# Patient Record
Sex: FEMALE | Race: WHITE | Employment: OTHER | URBAN - METROPOLITAN AREA
[De-identification: names, ages, dates, MRNs, and addresses within clinical notes are randomized per-mention and may not be internally consistent; named-entity substitution may affect disease eponyms.]

---

## 2008-03-28 LAB — COLONOSCOPY, EXTERNAL: 0

## 2011-01-01 LAB — PAP SMEAR, EXTERNAL: 0

## 2017-02-17 ENCOUNTER — APPOINTMENT (OUTPATIENT)
Dept: CT IMAGING | Age: 72
End: 2017-02-17
Attending: PHYSICIAN ASSISTANT
Payer: MEDICARE

## 2017-02-17 ENCOUNTER — HOSPITAL ENCOUNTER (OUTPATIENT)
Age: 72
Setting detail: OBSERVATION
Discharge: HOME | End: 2017-02-19
Attending: EMERGENCY MEDICINE | Admitting: INTERNAL MEDICINE
Payer: MEDICARE

## 2017-02-17 ENCOUNTER — APPOINTMENT (OUTPATIENT)
Dept: GENERAL RADIOLOGY | Age: 72
End: 2017-02-17
Attending: PHYSICIAN ASSISTANT
Payer: MEDICARE

## 2017-02-17 DIAGNOSIS — R55 SYNCOPE AND COLLAPSE: Primary | ICD-10-CM

## 2017-02-17 LAB
ALBUMIN SERPL BCP-MCNC: 3.9 G/DL (ref 3.5–5)
ALBUMIN/GLOB SERPL: 1.2 {RATIO} (ref 1.1–2.2)
ALP SERPL-CCNC: 66 U/L (ref 45–117)
ALT SERPL-CCNC: 39 U/L (ref 12–78)
ANION GAP BLD CALC-SCNC: 9 MMOL/L (ref 5–15)
APPEARANCE UR: CLEAR
AST SERPL W P-5'-P-CCNC: 23 U/L (ref 15–37)
BACTERIA URNS QL MICRO: NEGATIVE /HPF
BASOPHILS # BLD AUTO: 0 K/UL (ref 0–0.1)
BASOPHILS # BLD: 0 % (ref 0–1)
BILIRUB SERPL-MCNC: 0.3 MG/DL (ref 0.2–1)
BILIRUB UR QL: NEGATIVE
BUN SERPL-MCNC: 17 MG/DL (ref 6–20)
BUN/CREAT SERPL: 22 (ref 12–20)
CALCIUM SERPL-MCNC: 8.7 MG/DL (ref 8.5–10.1)
CHLORIDE SERPL-SCNC: 101 MMOL/L (ref 97–108)
CK MB CFR SERPL CALC: NORMAL % (ref 0–2.5)
CK MB SERPL-MCNC: <1 NG/ML (ref 5–25)
CK SERPL-CCNC: 90 U/L (ref 26–192)
CO2 SERPL-SCNC: 29 MMOL/L (ref 21–32)
COLOR UR: ABNORMAL
CREAT SERPL-MCNC: 0.76 MG/DL (ref 0.55–1.02)
D DIMER PPP FEU-MCNC: 0.93 MG/L FEU (ref 0–0.65)
EOSINOPHIL # BLD: 0 K/UL (ref 0–0.4)
EOSINOPHIL NFR BLD: 0 % (ref 0–7)
EPITH CASTS URNS QL MICRO: ABNORMAL /LPF
ERYTHROCYTE [DISTWIDTH] IN BLOOD BY AUTOMATED COUNT: 12.7 % (ref 11.5–14.5)
GLOBULIN SER CALC-MCNC: 3.2 G/DL (ref 2–4)
GLUCOSE BLD STRIP.AUTO-MCNC: 127 MG/DL (ref 65–100)
GLUCOSE SERPL-MCNC: 115 MG/DL (ref 65–100)
GLUCOSE UR STRIP.AUTO-MCNC: NEGATIVE MG/DL
HCT VFR BLD AUTO: 43.9 % (ref 35–47)
HGB BLD-MCNC: 14.3 G/DL (ref 11.5–16)
HGB UR QL STRIP: ABNORMAL
HYALINE CASTS URNS QL MICRO: ABNORMAL /LPF (ref 0–5)
KETONES UR QL STRIP.AUTO: ABNORMAL MG/DL
LEUKOCYTE ESTERASE UR QL STRIP.AUTO: NEGATIVE
LYMPHOCYTES # BLD AUTO: 8 % (ref 12–49)
LYMPHOCYTES # BLD: 0.9 K/UL (ref 0.8–3.5)
MAGNESIUM SERPL-MCNC: 2 MG/DL (ref 1.6–2.4)
MCH RBC QN AUTO: 30.3 PG (ref 26–34)
MCHC RBC AUTO-ENTMCNC: 32.6 G/DL (ref 30–36.5)
MCV RBC AUTO: 93 FL (ref 80–99)
MONOCYTES # BLD: 0.7 K/UL (ref 0–1)
MONOCYTES NFR BLD AUTO: 7 % (ref 5–13)
NEUTS SEG # BLD: 8.8 K/UL (ref 1.8–8)
NEUTS SEG NFR BLD AUTO: 85 % (ref 32–75)
NITRITE UR QL STRIP.AUTO: NEGATIVE
PH UR STRIP: 5 [PH] (ref 5–8)
PLATELET # BLD AUTO: 268 K/UL (ref 150–400)
POTASSIUM SERPL-SCNC: 3.9 MMOL/L (ref 3.5–5.1)
PROT SERPL-MCNC: 7.1 G/DL (ref 6.4–8.2)
PROT UR STRIP-MCNC: NEGATIVE MG/DL
RBC # BLD AUTO: 4.72 M/UL (ref 3.8–5.2)
RBC #/AREA URNS HPF: ABNORMAL /HPF (ref 0–5)
SERVICE CMNT-IMP: ABNORMAL
SODIUM SERPL-SCNC: 139 MMOL/L (ref 136–145)
SP GR UR REFRACTOMETRY: 1.01 (ref 1–1.03)
TROPONIN I SERPL-MCNC: <0.04 NG/ML
TSH SERPL DL<=0.05 MIU/L-ACNC: 3.3 UIU/ML (ref 0.36–3.74)
UROBILINOGEN UR QL STRIP.AUTO: 0.2 EU/DL (ref 0.2–1)
WBC # BLD AUTO: 10.4 K/UL (ref 3.6–11)
WBC URNS QL MICRO: ABNORMAL /HPF (ref 0–4)

## 2017-02-17 PROCEDURE — 80053 COMPREHEN METABOLIC PANEL: CPT | Performed by: PHYSICIAN ASSISTANT

## 2017-02-17 PROCEDURE — 83735 ASSAY OF MAGNESIUM: CPT | Performed by: PHYSICIAN ASSISTANT

## 2017-02-17 PROCEDURE — 82962 GLUCOSE BLOOD TEST: CPT

## 2017-02-17 PROCEDURE — 96374 THER/PROPH/DIAG INJ IV PUSH: CPT

## 2017-02-17 PROCEDURE — 65390000012 HC CONDITION CODE 44 OBSERVATION

## 2017-02-17 PROCEDURE — 99285 EMERGENCY DEPT VISIT HI MDM: CPT

## 2017-02-17 PROCEDURE — 65270000029 HC RM PRIVATE

## 2017-02-17 PROCEDURE — 82550 ASSAY OF CK (CPK): CPT | Performed by: PHYSICIAN ASSISTANT

## 2017-02-17 PROCEDURE — 74011250636 HC RX REV CODE- 250/636: Performed by: INTERNAL MEDICINE

## 2017-02-17 PROCEDURE — 85379 FIBRIN DEGRADATION QUANT: CPT | Performed by: INTERNAL MEDICINE

## 2017-02-17 PROCEDURE — 71020 XR CHEST PA LAT: CPT

## 2017-02-17 PROCEDURE — 70450 CT HEAD/BRAIN W/O DYE: CPT

## 2017-02-17 PROCEDURE — 96361 HYDRATE IV INFUSION ADD-ON: CPT

## 2017-02-17 PROCEDURE — 74011250636 HC RX REV CODE- 250/636: Performed by: PHYSICIAN ASSISTANT

## 2017-02-17 PROCEDURE — 36415 COLL VENOUS BLD VENIPUNCTURE: CPT | Performed by: PHYSICIAN ASSISTANT

## 2017-02-17 PROCEDURE — 85025 COMPLETE CBC W/AUTO DIFF WBC: CPT | Performed by: PHYSICIAN ASSISTANT

## 2017-02-17 PROCEDURE — 84443 ASSAY THYROID STIM HORMONE: CPT | Performed by: EMERGENCY MEDICINE

## 2017-02-17 PROCEDURE — 93005 ELECTROCARDIOGRAM TRACING: CPT

## 2017-02-17 PROCEDURE — 81001 URINALYSIS AUTO W/SCOPE: CPT | Performed by: PHYSICIAN ASSISTANT

## 2017-02-17 PROCEDURE — 84484 ASSAY OF TROPONIN QUANT: CPT | Performed by: PHYSICIAN ASSISTANT

## 2017-02-17 RX ORDER — SODIUM CHLORIDE 0.9 % (FLUSH) 0.9 %
5-10 SYRINGE (ML) INJECTION EVERY 8 HOURS
Status: DISCONTINUED | OUTPATIENT
Start: 2017-02-17 | End: 2017-02-19 | Stop reason: HOSPADM

## 2017-02-17 RX ORDER — SODIUM CHLORIDE 9 MG/ML
75 INJECTION, SOLUTION INTRAVENOUS CONTINUOUS
Status: DISCONTINUED | OUTPATIENT
Start: 2017-02-17 | End: 2017-02-19 | Stop reason: HOSPADM

## 2017-02-17 RX ORDER — ENOXAPARIN SODIUM 100 MG/ML
40 INJECTION SUBCUTANEOUS EVERY 24 HOURS
Status: DISCONTINUED | OUTPATIENT
Start: 2017-02-17 | End: 2017-02-19 | Stop reason: HOSPADM

## 2017-02-17 RX ORDER — SODIUM CHLORIDE 0.9 % (FLUSH) 0.9 %
5-10 SYRINGE (ML) INJECTION AS NEEDED
Status: DISCONTINUED | OUTPATIENT
Start: 2017-02-17 | End: 2017-02-19 | Stop reason: HOSPADM

## 2017-02-17 RX ORDER — ONDANSETRON 2 MG/ML
4 INJECTION INTRAMUSCULAR; INTRAVENOUS
Status: COMPLETED | OUTPATIENT
Start: 2017-02-17 | End: 2017-02-17

## 2017-02-17 RX ADMIN — ENOXAPARIN SODIUM 40 MG: 40 INJECTION SUBCUTANEOUS at 22:24

## 2017-02-17 RX ADMIN — SODIUM CHLORIDE 1000 ML: 900 INJECTION, SOLUTION INTRAVENOUS at 20:34

## 2017-02-17 RX ADMIN — SODIUM CHLORIDE 75 ML/HR: 900 INJECTION, SOLUTION INTRAVENOUS at 22:25

## 2017-02-17 RX ADMIN — ONDANSETRON 4 MG: 2 INJECTION INTRAMUSCULAR; INTRAVENOUS at 19:56

## 2017-02-17 NOTE — IP AVS SNAPSHOT
[address] [facility] 
 
 
 [address] [facility] 
[phone] Patient: Ulysses Johnson MRN: [id number] :1945 You are allergic to the following No active allergies Recent Documentation Height Weight BMI OB Status Smoking Status 1.626 m 75.1 kg 28.42 kg/m2 Menopause Never Smoker Unresulted Labs Order Current Status LIPID PANEL In process Emergency Contacts Name Discharge Info Relation Home Work Mobile Spyder Lynk 23 CAREGIVER [3] Child [2] [phone] [address] CAREGIVER [3]  [phone] About your hospitalization You were admitted on:  2017 You last received care in the:  [facility] 3 PRO CARE TELE 2 You were discharged on:  2017 Unit phone number:  [phone] Why you were hospitalized Your primary diagnosis was:  Not on File Your diagnoses also included:  Syncope, Urinary Incontinence Providers Seen During Your Hospitalizations Provider Role Specialty Primary office phone Carolyn Schulte MD Attending Provider Emergency Medicine [phone] Kaushik Bailey MD Attending Provider Internal Medicine [phone] Sherrell Sainz DO Attending Provider Internal Medicine [phone] Your Primary Care Physician (PCP) Primary Care Physician Office Phone Office Fax [address]88 [address] [phone] Follow-up Information Follow up With Details Comments Contact Info Nikko Pak MD Schedule an appointment as soon as possible for a visit in 2 weeks Event monitor [address] [address] [place]  [address] [facility] 
[phone] Alia Currie [address] [facility] [address] [address] 
[phone]  Diego Parish MD  Call office the week after discharge and speak with Traci to schedule a doppler of the carotids and a follow up appointment the same day after the test has been completed. appointment derek be 6 months from now --2017 [place] [address] [facility] [address] [facility] [place] [address] 
[phone] Current Discharge Medication List  
  
START taking these medications Dose & Instructions Dispensing Information Comments Morning Noon Evening Bedtime  
 aspirin 81 mg chewable tablet Your next dose is: Today, Tomorrow Other:  _________ Dose:  81 mg Take 1 Tab by mouth daily. Quantity:  30 Tab Refills:  0  
     
   
   
   
  
 pravastatin 40 mg tablet Commonly known as:  PRAVACHOL Your next dose is: Today, Tomorrow Other:  _________ Dose:  40 mg Take 1 Tab by mouth nightly. Quantity:  30 Tab Refills:  0 CONTINUE these medications which have NOT CHANGED Dose & Instructions Dispensing Information Comments Morning Noon Evening Bedtime  
 glucosamine-chondroitin 500-400 mg Cap Commonly known as:  20 [facility] Your next dose is: Today, Tomorrow Other:  _________ Dose:  2 Cap Take 2 Caps by mouth daily. Refills:  0 Where to Get Your Medications Information on where to get these meds will be given to you by the nurse or doctor. ! Ask your nurse or doctor about these medications  
  aspirin 81 mg chewable tablet  
 pravastatin 40 mg tablet Discharge Instructions Patient Discharge Instructions Alonso Hill / [id number] : 1945 Admitted 2017 Discharged: 2017 Primary Diagnoses Problem List as of 2017  Never Reviewed Codes Class Noted - Resolved Urinary incontinence ICD-10-CM: R32 
ICD-9-CM: 788.30  2017 - Present Syncope ICD-10-CM: R55 
ICD-9-CM: 780.2  2017 - Present Take Home Medications · It is important that you take the medication exactly as they are prescribed. · Keep your medication in the bottles provided by the pharmacist and keep a list of the medication names, dosages, and times to be taken in your wallet. · Do not take other medications without consulting your doctor. · You were started on two new medications. Baby aspirin was started to reduce your stroke What to do at [facility] Recommended diet: {diet:36683} Recommended activity: {discharge activity:35502} If you experience ***, please follow up with ***. Follow-up with your PCP in {0-10:86968} { day/week/month:60530} Information obtained by : 
I understand that if any problems occur once I am at home I am to contact my physician. I understand and acknowledge receipt of the instructions indicated above. Physician's or R.N.'s Signature                                                                  Date/Time Patient or Representative Signature                                                          Date/Time Discharge Instructions Attachments/References PRAVASTATIN (BY MOUTH) (ENGLISH) CAROTID STENOSIS (ENGLISH) VASOVAGAL SYNCOPE (ENGLISH) FAINTING (ENGLISH) Discharge Orders None [facility] Announcement We are excited to announce that we are making your provider's discharge notes available to you in Hello Universe. You will see these notes when they are completed and signed by the physician that discharged you from your recent hospital stay.   If you have any questions or concerns about any information you see in [employer], please call the [facility] Department where you were seen or reach out to your Primary Care Provider for more information about your plan of care. Introducing \[facility] [place] [facility]\"" & HEALTH SERVICES! [employer] introduces [employer] patient portal. Now you can access parts of your medical record, email your doctor's office, and request medication refills online. 1. In your internet browser, go to [email or web address] 2. Click on the First Time User? Click Here link in the Sign In box. You will see the New Member Sign Up page. 3. Enter your [employer] Access Code exactly as it appears below. You will not need to use this code after youve completed the sign-up process. If you do not sign up before the expiration date, you must request a new code. · [employer] Access Code: [email or web address] Expires: 5/18/2017  8:05 PM 
 
4. Enter the last four digits of your Social Security Number (xxxx) and Date of Birth (mm/dd/yyyy) as indicated and click Submit. You will be taken to the next sign-up page. 5. Create a [employer] ID. This will be your [employer] login ID and cannot be changed, so think of one that is secure and easy to remember. 6. Create a [employer] password. You can change your password at any time. 7. Enter your Password Reset Question and Answer. This can be used at a later time if you forget your password. 8. Enter your e-mail address. You will receive e-mail notification when new information is available in [address] 9. Click Sign Up. You can now view and download portions of your medical record. 10. Click the Download Summary menu link to download a portable copy of your medical information. If you have questions, please visit the Frequently Asked Questions section of the [employer] website. Remember, [employer] is NOT to be used for urgent needs. For medical emergencies, dial 911. Now available from your iPhone and Android! General Information Please provide this summary of care documentation to your next provider. Patient Signature:  ____________________________________________________________ Date:  ____________________________________________________________  
  
Mao Mobley Provider Signature:  ____________________________________________________________ Date:  ____________________________________________________________ More Information Pravastatin (By mouth) Pravastatin (prav-a-STAT-in) Treats high cholesterol and triglyceride levels. May reduce the risk of heart attack, stroke, and related health conditions. This medicine is a statin. Brand Name(s):Pravachol There may be other brand names for this medicine. When This Medicine Should Not Be Used: This medicine is not right for everyone. Do not use it if you have had an allergic reaction to pravastatin, you have active liver disease, or you are pregnant or breastfeeding. How to Use This Medicine:  
Tablet · Take your medicine as directed. Your dose may need to be changed several times to find what works best for you. · Take this medicine in the evening or at bedtime, unless your doctor tells you differently. · Missed dose: Take a dose as soon as you remember. If it is almost time for your next dose, wait until then and take a regular dose. Do not take extra medicine to make up for a missed dose. · Store the medicine in a closed container at room temperature, away from heat, moisture, and direct light. Drugs and Foods to Avoid: Ask your doctor or pharmacist before using any other medicine, including over-the-counter medicines, vitamins, and herbal products. · Some medicines and foods can affect how pravastatin works. Tell your doctor if you are taking boceprevir, cimetidine, clarithromycin, colchicine, cyclosporine, erythromycin, itraconazole, ketoconazole, niacin (vitamin B3), or spironolactone. · Tell your doctor if you are taking other medicine to lower your cholesterol level, such as cholestyramine, colestipol, gemfibrozil, or fenofibrate. You may need to take the medicine 1 hour before or 4 hours after you take pravastatin. Warnings While Using This Medicine: · It is not safe to take this medicine during pregnancy. It could harm an unborn baby. Tell your doctor right away if you become pregnant. · Tell your doctor if you have kidney disease, diabetes, epilepsy, muscle pain or weakness, thyroid problems, or a history of liver disease. Tell your doctor if you usually have more than 2 drinks of alcohol per day. · Tell any doctor or dentist who treats you that you are using this medicine. You may need to stop using this medicine several days before you have surgery or medical tests. · Your doctor will do lab tests at regular visits to check on the effects of this medicine. Keep all appointments. · Keep all medicine out of the reach of children. Never share your medicine with anyone. Possible Side Effects While Using This Medicine:  
Call your doctor right away if you notice any of these side effects: · Allergic reaction: Itching or hives, swelling in your face or hands, swelling or tingling in your mouth or throat, chest tightness, trouble breathing · Blistering, peeling, red skin rash · Dark urine or pale stools, diarrhea, nausea, vomiting, loss of appetite, pain in your upper stomach, yellow skin or eyes · Muscle pain, tenderness, or weakness · Unusual tiredness If you notice other side effects that you think are caused by this medicine, tell your doctor. Call your doctor for medical advice about side effects. You may report side effects to FDA at 4-554-FDA-2360 © 2016 [address] is for End User's use only and may not be sold, redistributed or otherwise used for commercial purposes. The above information is an  only.  It is not intended as medical advice for individual conditions or treatments. Talk to your doctor, nurse or pharmacist before following any medical regimen to see if it is safe and effective for you. Carotid Stenosis: Care Instructions Your Care Instructions Carotid stenosis is narrowing of one or both of the carotid arteries. These arteries take blood from the heart to the brain. There is one on each side of the neck. A substance called plaque builds up inside an artery. This makes it too narrow. Plaque comes from damage to the artery over time. This damage may be caused by high blood pressure, high cholesterol, diabetes, or smoking. Sometimes plaque can break loose from the carotid artery and move to the brain. This can cause a stroke or transient ischemic attack (TIA). The goal of treatment is to lower your risk of having a stroke or TIA. You can lower your risk by making healthy lifestyle changes and taking medicine. Sometimes a surgery or procedure is done. Follow-up care is a key part of your treatment and safety. Be sure to make and go to all appointments, and call your doctor if you are having problems. It's also a good idea to know your test results and keep a list of the medicines you take. How can you care for yourself at home? · Take your medicines exactly as prescribed. Call your doctor if you think you are having a problem with your medicine. You may take medicine to lower your blood pressure, to lower your cholesterol, or to prevent blood clots. · If you take a blood thinner, such as aspirin, be sure to get instructions about how to take your medicine safely. Blood thinners can cause serious bleeding problems. · Do not smoke. People who smoke have a higher chance of stroke than those who quit. If you need help quitting, talk to your doctor about stop-smoking programs and medicines. These can increase your chances of quitting for good. · Eat a healthy diet that is low in saturated fat and salt. Eat lots of fresh fruits and vegetables and foods high in fiber. · Stay at a healthy weight. Lose weight if you need to. · Talk to your doctor about starting an exercise program. Regular exercise lowers your chance of stroke. · Limit alcohol to 2 drinks a day for men and 1 drink a day for women. Too much alcohol can cause health problems. · Work with your doctor to control high blood pressure, high cholesterol, diabetes, and other conditions that increase your chance of a stroke. A healthy diet, exercise, weight loss (if needed), and medicines can help. · Avoid colds and flu. Get the flu vaccine every year. When should you call for help? Call 911 anytime you think you may need emergency care. For example, call if: 
· You passed out (lost consciousness). · You have symptoms of a stroke. These may include: 
¨ Sudden numbness, tingling, weakness, or loss of movement in your face, arm, or leg, especially on only one side of your body. ¨ Sudden vision changes. ¨ Sudden trouble speaking. ¨ Sudden confusion or trouble understanding simple statements. ¨ Sudden problems with walking or balance. ¨ A sudden, severe headache that is different from past headaches. Call your doctor now or seek immediate medical care if: 
· You are dizzy or lightheaded, or you feel like you may faint. Watch closely for changes in your health, and be sure to contact your doctor if you have any problems. Where can you learn more? Go to http://obi-shay.info/. Enter V782 in the search box to learn more about \"Carotid Stenosis: Care Instructions. \" Current as of: January 27, 2016 Content Version: 11.1 © 7321-8527 [employer]. Care instructions adapted under license by [employer] (which disclaims liability or warranty for this information).  If you have questions about a medical condition or this instruction, always ask your healthcare professional. [place] [address] any warranty or liability for your use of this information. Vasovagal Syncope: Care Instructions Your Care Instructions Vasovagal syncope (say \"eyk-zof-CHA-hunter ARZOLAVKPE-tyy-ttm\") is sudden dizziness or fainting that can be set off by things such as pain, stress, fear, or trauma. You may sweat or feel lightheaded, sick to your stomach, or tingly. The problem causes the heart rate to slow and the blood vessels to widen, or dilate, for a short time. When this happens, blood pools in the lower body, and less blood goes to the brain. You can usually get relief by lying down with your legs raised (elevated). This helps more blood to flow to your brain and may help relieve symptoms like feeling dizzy. Some doctors may recommend a technique that involves tensing your fists and arms. This type of fainting is often easy to predict. For example, it happens to some people when they see blood or have to get a shot. They may feel symptoms before they faint. An episode of vasovagal syncope usually responds well to self-care. Other treatment often isn't needed. But if the fainting keeps happening, your doctor may suggest further treatments. Follow-up care is a key part of your treatment and safety. Be sure to make and go to all appointments, and call your doctor if you are having problems. It's also a good idea to know your test results and keep a list of the medicines you take. How can you care for yourself at home? · Drink plenty of fluids to prevent dehydration. If you have kidney, heart, or liver disease and have to limit fluids, talk with your doctor before you increase your fluid intake. · Try to avoid things that you think may set off vasovagal syncope. · Talk to your doctor about any medicines you take. Some medicines may increase the chance of this condition occurring. · If you feel symptoms, lie down with your legs raised. Talk to your doctor about what to do if your symptoms come back. When should you call for help? Call 911 anytime you think you may need emergency care. For example, call if: 
· You have symptoms of a heart problem. These may include: ¨ Chest pain or pressure. ¨ Severe trouble breathing. ¨ A fast or irregular heartbeat. Watch closely for changes in your health, and be sure to contact your doctor if: 
· You have more episodes of fainting at home. · You do not get better as expected. Where can you learn more? Go to http://obi-shay.info/. Enter L754 in the search box to learn more about \"Vasovagal Syncope: Care Instructions. \" Current as of: May 27, 2016 Content Version: 11.1 © 8326-4611 [employer]. Care instructions adapted under license by [employer] (which disclaims liability or warranty for this information). If you have questions about a medical condition or this instruction, always ask your healthcare professional. [place] [address] any warranty or liability for your use of this information. Fainting: Care Instructions Your Care Instructions When you faint, or pass out, you lose consciousness for a short time. A brief drop in blood flow to the brain often causes it. When you fall or lie down, more blood flows to your brain and you regain consciousness. Emotional stress, pain, or overheatingespecially if you have been standingcan make you faint. In these cases, fainting is usually not serious. But fainting can be a sign of a more serious problem. Your doctor may want you to have more tests to rule out other causes. The treatment you need depends on the reason why you fainted. The doctor has checked you carefully, but problems can develop later. If you notice any problems or new symptoms, get medical treatment right away. Follow-up care is a key part of your treatment and safety. Be sure to make and go to all appointments, and call your doctor if you are having problems. It's also a good idea to know your test results and keep a list of the medicines you take. How can you care for yourself at home? · Drink plenty of fluids to prevent dehydration. If you have kidney, heart, or liver disease and have to limit fluids, talk with your doctor before you increase your fluid intake. When should you call for help? Call 911 anytime you think you may need emergency care. For example, call if: 
· You have symptoms of a heart problem. These may include: ¨ Chest pain or pressure. ¨ Severe trouble breathing. ¨ A fast or irregular heartbeat. ¨ Lightheadedness or sudden weakness. ¨ Coughing up pink, foamy mucus. ¨ Passing out. After you call 911, the  may tell you to chew 1 adult-strength or 2 to 4 low-dose aspirin. Wait for an ambulance. Do not try to drive yourself. · You have symptoms of a stroke. These may include: 
¨ Sudden numbness, tingling, weakness, or loss of movement in your face, arm, or leg, especially on only one side of your body. ¨ Sudden vision changes. ¨ Sudden trouble speaking. ¨ Sudden confusion or trouble understanding simple statements. ¨ Sudden problems with walking or balance. ¨ A sudden, severe headache that is different from past headaches. · You passed out (lost consciousness) again. Watch closely for changes in your health, and be sure to contact your doctor if: 
· You do not get better as expected. Where can you learn more? Go to http://obi-shay.info/. Enter J501 in the search box to learn more about \"Fainting: Care Instructions. \" Current as of: May 27, 2016 Content Version: 11.1 © 6206-7216 [employer].  Care instructions adapted under license by [employer] (which disclaims liability or warranty for this information). If you have questions about a medical condition or this instruction, always ask your healthcare professional. [place] [address] any warranty or liability for your use of this information.

## 2017-02-17 NOTE — IP AVS SNAPSHOT
Current Discharge Medication List  
  
Take these medications at their scheduled times Dose & Instructions Dispensing Information Comments Morning Noon Evening Bedtime  
 aspirin 81 mg chewable tablet Your next dose is: Today, Tomorrow Other:  ____________ Dose:  81 mg Take 1 Tab by mouth daily. Quantity:  30 Tab Refills:  0  
     
   
   
   
  
 glucosamine-chondroitin 500-400 mg Cap Commonly known as:  20 [facility] Your next dose is: Today, Tomorrow Other:  ____________ Dose:  2 Cap Take 2 Caps by mouth daily. Refills:  0  
     
   
   
   
  
 pravastatin 40 mg tablet Commonly known as:  PRAVACHOL Your next dose is: Today, Tomorrow Other:  ____________ Dose:  40 mg Take 1 Tab by mouth nightly. Quantity:  30 Tab Refills:  0 Where to Get Your Medications Information about where to get these medications is not yet available ! Ask your nurse or doctor about these medications  
  aspirin 81 mg chewable tablet  
 pravastatin 40 mg tablet

## 2017-02-18 ENCOUNTER — APPOINTMENT (OUTPATIENT)
Dept: CT IMAGING | Age: 72
End: 2017-02-18
Attending: PHYSICIAN ASSISTANT
Payer: MEDICARE

## 2017-02-18 ENCOUNTER — APPOINTMENT (OUTPATIENT)
Dept: MRI IMAGING | Age: 72
End: 2017-02-18
Attending: INTERNAL MEDICINE
Payer: MEDICARE

## 2017-02-18 PROBLEM — R32 URINARY INCONTINENCE: Status: ACTIVE | Noted: 2017-02-18

## 2017-02-18 LAB
ALBUMIN SERPL BCP-MCNC: 3.4 G/DL (ref 3.5–5)
ALBUMIN/GLOB SERPL: 1.2 {RATIO} (ref 1.1–2.2)
ALP SERPL-CCNC: 65 U/L (ref 45–117)
ALT SERPL-CCNC: 33 U/L (ref 12–78)
ANION GAP BLD CALC-SCNC: 8 MMOL/L (ref 5–15)
AST SERPL W P-5'-P-CCNC: 26 U/L (ref 15–37)
BASOPHILS # BLD AUTO: 0 K/UL (ref 0–0.1)
BASOPHILS # BLD: 0 % (ref 0–1)
BILIRUB SERPL-MCNC: 0.5 MG/DL (ref 0.2–1)
BUN SERPL-MCNC: 11 MG/DL (ref 6–20)
BUN/CREAT SERPL: 16 (ref 12–20)
CALCIUM SERPL-MCNC: 8.1 MG/DL (ref 8.5–10.1)
CHLORIDE SERPL-SCNC: 107 MMOL/L (ref 97–108)
CO2 SERPL-SCNC: 29 MMOL/L (ref 21–32)
CREAT SERPL-MCNC: 0.68 MG/DL (ref 0.55–1.02)
EOSINOPHIL # BLD: 0 K/UL (ref 0–0.4)
EOSINOPHIL NFR BLD: 1 % (ref 0–7)
ERYTHROCYTE [DISTWIDTH] IN BLOOD BY AUTOMATED COUNT: 12.7 % (ref 11.5–14.5)
GLOBULIN SER CALC-MCNC: 2.9 G/DL (ref 2–4)
GLUCOSE SERPL-MCNC: 95 MG/DL (ref 65–100)
HCT VFR BLD AUTO: 40.6 % (ref 35–47)
HGB BLD-MCNC: 13.7 G/DL (ref 11.5–16)
LYMPHOCYTES # BLD AUTO: 23 % (ref 12–49)
LYMPHOCYTES # BLD: 1.5 K/UL (ref 0.8–3.5)
MCH RBC QN AUTO: 31.2 PG (ref 26–34)
MCHC RBC AUTO-ENTMCNC: 33.7 G/DL (ref 30–36.5)
MCV RBC AUTO: 92.5 FL (ref 80–99)
MONOCYTES # BLD: 0.9 K/UL (ref 0–1)
MONOCYTES NFR BLD AUTO: 13 % (ref 5–13)
NEUTS SEG # BLD: 4.3 K/UL (ref 1.8–8)
NEUTS SEG NFR BLD AUTO: 63 % (ref 32–75)
PLATELET # BLD AUTO: 270 K/UL (ref 150–400)
POTASSIUM SERPL-SCNC: 3.9 MMOL/L (ref 3.5–5.1)
PROT SERPL-MCNC: 6.3 G/DL (ref 6.4–8.2)
RBC # BLD AUTO: 4.39 M/UL (ref 3.8–5.2)
SODIUM SERPL-SCNC: 144 MMOL/L (ref 136–145)
TROPONIN I SERPL-MCNC: <0.04 NG/ML
TROPONIN I SERPL-MCNC: <0.04 NG/ML
WBC # BLD AUTO: 6.8 K/UL (ref 3.6–11)

## 2017-02-18 PROCEDURE — 85025 COMPLETE CBC W/AUTO DIFF WBC: CPT | Performed by: INTERNAL MEDICINE

## 2017-02-18 PROCEDURE — 65660000000 HC RM CCU STEPDOWN

## 2017-02-18 PROCEDURE — 93306 TTE W/DOPPLER COMPLETE: CPT

## 2017-02-18 PROCEDURE — 36415 COLL VENOUS BLD VENIPUNCTURE: CPT | Performed by: INTERNAL MEDICINE

## 2017-02-18 PROCEDURE — 65390000012 HC CONDITION CODE 44 OBSERVATION

## 2017-02-18 PROCEDURE — 74011636320 HC RX REV CODE- 636/320: Performed by: RADIOLOGY

## 2017-02-18 PROCEDURE — 84484 ASSAY OF TROPONIN QUANT: CPT | Performed by: INTERNAL MEDICINE

## 2017-02-18 PROCEDURE — 71275 CT ANGIOGRAPHY CHEST: CPT

## 2017-02-18 PROCEDURE — 70551 MRI BRAIN STEM W/O DYE: CPT

## 2017-02-18 PROCEDURE — 80053 COMPREHEN METABOLIC PANEL: CPT | Performed by: INTERNAL MEDICINE

## 2017-02-18 RX ADMIN — IOPAMIDOL 75 ML: 755 INJECTION, SOLUTION INTRAVENOUS at 02:32

## 2017-02-18 NOTE — H&P
Admission History and Physical      NAME:  Yadira Warner   :   1945   MRN:  [id number]     PCP:  Sharla Diggs MD     Date/Time:  2017           Assessment/Plan:       Active Problems:    Syncope / urinary incontience / ?post-ictal state: favor neurocardiogenic as cause. Admit to telemetry. Neurology consult. TTE. Orthostatics. CTA for PE given elevated d-dimer. Serial Jorge. MRI brain given incontinence, prolonged unconsciousness, and ?post-ictal.         Subjective:     CHIEF COMPLAINT: Syncope/Fatigue     HISTORY OF PRESENT ILLNESS:     Ms. Castle is a 66 y.o.  female with a hx of childhood polio who is admitted with syncope. Ms. Castle presented to the Emergency Department today complaining of standing with friend outside at 1800, felt dizzy, flushed, diaphoretic followed by LOC and passing out. No GLF and caught by friend. LOC lasted 3-5 mins. Associated with urinary incontinence but no seizure activity. Resolved spontaneously but a/w fatigue, pale appearance and weakness. We will admit for further management. Past Medical History   Diagnosis Date    Polio         Past Surgical History   Procedure Laterality Date    Shadi biopsy breast stereotactic Right      negative       Social History   Substance Use Topics    Smoking status: Never Smoker    Smokeless tobacco: Not on file    Alcohol use No        No family hx of early cardiovascular disease     No Known Allergies     Prior to Admission medications    Medication Sig Start Date End Date Taking? Authorizing Provider   glucosamine-chondroitin (ARTHX) 500-400 mg cap Take 2 Caps by mouth daily.    Yes Phys Other, MD         Review of Systems:  (bold if positive, if negative)    Gen:  Eyes:  ENT:  CVS:  dizziness, syncopePulm:  GI:    :    MS:  Skin:  Psych:  Endo:    Hem:  Renal:    Neuro:            Objective:      VITALS:    Vital signs reviewed; most recent are:    Visit Vitals    /69    Pulse 83    Temp 97.8 °F (36.6 °C)    Resp 21    Ht 5' 3\" (1.6 m)    Wt 73.9 kg (163 lb)    SpO2 95%    BMI 28.87 kg/m2     SpO2 Readings from Last 6 Encounters:   02/17/17 95%   01/06/16 96%        No intake or output data in the 24 hours ending 02/17/17 4162         Exam:     Physical Exam:    Gen:  Well-developed, well-nourished, in no acute distress  HEENT:  Pink conjunctivae, PERRL, hearing intact to voice, moist mucous membranes  Neck:  Supple, without masses, thyroid non-tender  Resp:  No accessory muscle use, clear breath sounds without wheezes rales or rhonchi  Card:  No murmurs, normal S1, S2 without thrills, bruits or peripheral edema  Abd:  Soft, non-tender, non-distended, normoactive bowel sounds are present  Lymph:  No cervical adenopathy  Musc:  No cyanosis or clubbing  Skin:  No rashes or ulcers, skin turgor is good  Neuro:  Cranial nerves 3-12 are grossly intact,  strength is 5/5 bilaterally, dorsi / plantarflexion strength is 5/5 bilaterally, follows commands appropriately  Psych:  Alert with good insight. Oriented to person, place, and time       Labs:    Recent Labs      02/17/17 1952   WBC  10.4   HGB  14.3   HCT  43.9   PLT  268     Recent Labs      02/17/17 1952   NA  139   K  3.9   CL  101   CO2  29   GLU  115*   BUN  17   CREA  0.76   CA  8.7   MG  2.0   ALB  3.9   TBILI  0.3   SGOT  23   ALT  39     Lab Results   Component Value Date/Time    Glucose (POC) 127 02/17/2017 07:36 PM     No results for input(s): PH, PCO2, PO2, HCO3, FIO2 in the last 72 hours. No results for input(s): INR in the last 72 hours. No lab exists for component: INREXT    Chest Xray:  No acute process  EKG reviewed:   Normal sinus rhythm     Medical records reviewed in preparation for this admission: Old medical records. Nursing notes.     Surrogate decision maker:  patient, daughter and son    Total time spent with patient: [phone] discussed with: Patient, Family, Nursing Staff and >50% of time spent in counseling and coordination of care    Discussed:  Care Plan and D/C Planning    Prophylaxis:  Lovenox    Probable Disposition:  Home w/Family           ___________________________________________________    Attending Physician: Leigh Ann Sanches DO

## 2017-02-18 NOTE — ED NOTES
Bedside shift change report given to Skinny Trinh (oncoming nurse) by Amrit Waite RN (offgoing nurse). Report given with SBAR, MAR, Recent Results, Vital Signs, and plan of care. Pt is alert and oriented x 4 . Call bell within reach of patient. Safety/fall precautions in place. Family at bedside.

## 2017-02-18 NOTE — PROGRESS NOTES
Medical Progress Note      NAME: Nick Morales   :  1945  MRM:  [id number]    Date/Time: 2017  12:15 PM       Assessment / Plan:     Syncope (2017): Suspect this was vagal.  Seems to have hx of vagal sensitivity as reports feeling near syncopal with needles and last episode was when grandson had to have stitches. Had symptoms of feeling hot, diaphoretic prior to syncope. Had nausea, vomiting, and diarrhea afterwards. No recurrence. However, family reports urinary incontinence and lethargy post syncope. -- await echo   -- check orthostatics   -- neuro to see    Paroxysmal Afib:  Had bradycardia overnight with afib. Bradycardia could be vagal mediated. No prior hx of afib or cardiology eval.  [id number] score is 3 for age and gender. -- await echo   -- cardiology consult   -- continue telemetry               Subjective:     Chief Complaint:  Feels well. No recurrence of syncope. No n/v or diarrhea overnight. Denies chest pain or sob. Feels nervous being in the hospital.  Denies stressful events yesterday. Denies pain or injury. ROS:  (bold if positive, if negative)              Objective:       Vitals:          Last 24hrs VS reviewed since prior progress note.  Most recent are:    Visit Vitals    /55    Pulse 85    Temp 97.8 °F (36.6 °C)    Resp 18    Ht 5' 3\" (1.6 m)    Wt 73.9 kg (163 lb)    SpO2 96%    BMI 28.87 kg/m2     SpO2 Readings from Last 6 Encounters:   17 96%   16 96%          Intake/Output Summary (Last 24 hours) at 17 1215  Last data filed at 17 0526   Gross per 24 hour   Intake                0 ml   Output              325 ml   Net             -325 ml          Exam:     Physical Exam:    Gen:  Well-developed, well-nourished, in no acute distress  HEENT:  Pink conjunctivae, hearing intact to voice, moist mucous membranes  Neck:  Supple  Resp:  No accessory muscle use, clear breath sounds without wheezes rales or rhonchi  Card: No murmurs, normal S1, S2 without thrills or peripheral edema  Abd:  Soft, non-tender, non-distended, normoactive bowel sounds are present  Musc:  No cyanosis  Skin:  No rashes, skin turgor is good  Neuro:  Cn 3-12 grossly intact,  strength is 5/5 bilaterally and dorsi / plantarflexion is 5/5 bilaterally, follows commands appropriately  Psych:  Good insight, oriented to person, place and time, alert       Telemetry reviewed:   afib with bradycardia overnight    Medications Reviewed: (see below)    Lab Data Reviewed: (see below)    ______________________________________________________________________    Medications:     Current Facility-Administered Medications   Medication Dose Route Frequency    sodium chloride (NS) flush 5-10 mL  5-10 mL IntraVENous Q8H    sodium chloride (NS) flush 5-10 mL  5-10 mL IntraVENous PRN    0.9% sodium chloride infusion  75 mL/hr IntraVENous CONTINUOUS    enoxaparin (LOVENOX) injection 40 mg  40 mg SubCUTAneous Q24H     Current Outpatient Prescriptions   Medication Sig    glucosamine-chondroitin (ARTHX) 500-400 mg cap Take 2 Caps by mouth daily.             Lab Review:     Recent Labs      02/18/17   0528  02/17/17 1952   WBC  6.8  10.4   HGB  13.7  14.3   HCT  40.6  43.9   PLT  270  268     Recent Labs      02/18/17   0528  02/17/17 1952   NA  144  139   K  3.9  3.9   CL  107  101   CO2  29  29   GLU  95  115*   BUN  11  17   CREA  0.68  0.76   CA  8.1*  8.7   MG   --   2.0   ALB  3.4*  3.9   TBILI  0.5  0.3   SGOT  26  23   ALT  33  39     Lab Results   Component Value Date/Time    Glucose (POC) 127 02/17/2017 07:36 PM         Total time spent with patient: [address] discussed with: Patient and Family    Discussed:  Care Plan    Prophylaxis:  Lovenox    Disposition:  Home w/Family           ___________________________________________________    Attending Physician: Evi Cannon MD

## 2017-02-18 NOTE — ED NOTES
Introduced self as primary nurse. Discussed plan of care with patient. Bed in low locked position and call bell in reach. Opportunity to ask questions given. Patient advised that medical information will be discussed at this time and it is their own responsibility to let nurse know if such conversation should not take place in presence of visitors. Pt verbalizes understanding.

## 2017-02-18 NOTE — ED NOTES
TRANSFER - OUT REPORT:    Verbal report given to [facility] RN(name) on [employer]  being transferred to [facility](unit) for routine progression of care       Report consisted of patients Situation, Background, Assessment and   Recommendations(SBAR). Information from the following report(s) SBAR, ED Summary, Procedure Summary, MAR and Recent Results was reviewed with the receiving nurse. Lines:   Peripheral IV 02/17/17 Left Antecubital (Active)   Site Assessment Clean, dry, & intact 2/18/2017  3:09 AM   Phlebitis Assessment 0 2/18/2017  3:09 AM   Infiltration Assessment 0 2/18/2017  3:09 AM   Dressing Status Clean, dry, & intact 2/18/2017  3:09 AM   Dressing Type Tape;Transparent 2/18/2017  3:09 AM   Hub Color/Line Status Infusing;Patent;Capped 2/18/2017  3:09 AM   Action Taken Blood drawn 2/18/2017  5:26 AM   Alcohol Cap Used Yes 2/18/2017  5:26 AM        Opportunity for questions and clarification was provided.       Patient transported with:   Roel Marcial

## 2017-02-18 NOTE — CONSULTS
Cardiology Consultation Note                                 Karmen Dukes MD, [address], [place], [id number] [place] [address]                         Phone [phone]; Fax [phone]            2017  7:20 PM  Teodoro Lorenzo MD  :  1945   MRN:  [id number]     CC: syncope  Reason for consult: same  Admission Diagnosis: Syncope  Requesting MD:     ASSESSMENT/RECOMMENDATIONS:   1)Syncope  -she did pass out after standing for prolonged period of time. last year she had a similar episode. she has no medicaion changes. She was noted to be lethargic and urinated on herself.   -she has had issues with vaso vagal syncope  -will do carotid ultrasound    2. Palpitations  -would continue to monitor.  -will likely need event triggered loop recorder        Troponin< 0.04         MsFbaian Mulligan is a 66 y.o.  female with a hx of childhood polio who is admitted with syncope. Ms. Anay Mulligan presented to the Emergency Department today complaining of standing with friend outside at 1800, felt dizzy, flushed, diaphoretic followed by LOC and passing out. No GLF and caught by friend. LOC lasted 3-5 mins. Associated with urinary incontinence but no seizure activity. Resolved spontaneously but a/w fatigue, pale appearance and weakness. We will admit for further management. Lucy Red is a 66 y.o. female I am seeing for syncope. Symptoms include: irregular heart beats, near-syncope, syncope  Cardiac risk factors: family history, post-menopausal.she has passed out in the past. She has felt dizzy and diaphoretic. There was associated incontinence. She feels occasional rapid heart beats at night. No Known Allergies      Past Medical History   Diagnosis Date    Polio         Past Surgical History   Procedure Laterality Date    Shadi biopsy breast stereotactic Right      negative        . Home Medications:  Prior to Admission Medications   Prescriptions Last Dose Informant Patient Reported? Taking?   glucosamine-chondroitin ([facility]) 500-400 mg cap 2/17/2017 at Unknown time  Yes Yes   Sig: Take 2 Caps by mouth daily. Facility-Administered Medications: None       Hospital Medications:  Current Facility-Administered Medications   Medication Dose Route Frequency    sodium chloride (NS) flush 5-10 mL  5-10 mL IntraVENous Q8H    sodium chloride (NS) flush 5-10 mL  5-10 mL IntraVENous PRN    0.9% sodium chloride infusion  75 mL/hr IntraVENous CONTINUOUS    enoxaparin (LOVENOX) injection 40 mg  40 mg SubCUTAneous Q24H     Current Outpatient Prescriptions   Medication Sig    glucosamine-chondroitin (ARTHX) 500-400 mg cap Take 2 Caps by mouth daily. OBJECTIVE       Laboratory and Imaging have been reviewed and are notable for      ECG:  [facility]      Diagnostic Tests:     Recent Labs      02/18/17 0528 02/17/17 1952   CPK   --    --   90   CKMB   --    --   <1.0   TROIQ  <0.04   < >  <0.04    < > = values in this interval not displayed. Recent Labs      02/18/17 0528 02/17/17 1952   NA  144  139   K  3.9  3.9   CO2  29  29   BUN  11  17   CREA  0.68  0.76   GLU  95  115*   MG   --   2.0   WBC  6.8  10.4   HGB  13.7  14.3   HCT  40.6  43.9   PLT  270  268         Cardiac work up to date:                   Social History:  Social History   Substance Use Topics    Smoking status: Never Smoker    Smokeless tobacco: Not on file    Alcohol use No       Family History:  No family history on file. Review of Symptoms:  A comprehensive review of systems was negative except for that written in the HPI. Physical Exam:      Visit Vitals    /55    Pulse 85    Temp 97.8 °F (36.6 °C)    Resp 18    Ht 5' 3\" (1.6 m)    Wt 163 lb (73.9 kg)    SpO2 96%    BMI 28.87 kg/m2     General Appearance:  Well developed, well nourished,alert and oriented x 3, and individual in no acute distress.    Ears/Nose/Mouth/Throat: Hearing grossly normal.Normal oral mucosa,no scleral icterus     Neck: Supple no JVD or bruits,no cervical lymphadenopathy   Chest:   Lungs clear to auscultation bilaterally,no rales rhonchi or wheezing   Cardiovascular:  Regular rate and rhythm, S1, S2 normal,  Murmur. PMI nondisplaced   Abdomen:   Soft, non-tender, bowel sounds are active. No abdominal bruits   Extremities: No edema bilaterally. Pulses detected, no varicosities   Skin: Warm and dry. No bruising   Neuro:                                                             I have discussed the diagnosis with the patient and the intended plan as seen in the above orders. Questions were answered concerning future plans. I have discussed medication side effects and warnings with the patient as well. Jazmine Hughes is in agreement to the plan listed above and wishes to proceed. she  was instructed not to smoke, eat heart healthy diet  and to exercise. Thank you for this consult.       Ariana Hugo MD

## 2017-02-18 NOTE — PROGRESS NOTES
1503 - TRANSFER - IN REPORT:    Verbal report received from [facility] [address] [facility] RN(name) on [employer]  being received from ER(unit) for routine progression of care      Report consisted of patients Situation, Background, Assessment and   Recommendations(SBAR). Information from the following report(s) SBAR, Kardex, Accordion, Recent Results and Cardiac Rhythm NSR was reviewed with the receiving nurse. Opportunity for questions and clarification was provided. Assessment completed upon patients arrival to unit and care assumed. 1520 - Pt arrived to floor from ER/ Pt accompanied by son. Pt A&O x 4. Respirations even and unlabored on RA. No acute distress noted. Pt and son oriented to staff and policies. Bed in lowest position and call bell within reach. [facility] Primary Nurse Carly Cespedes, TEMO and Alda Castaneda RN performed a dual skin assessment on this patient No impairment noted  John score is 20    1920 - Bedside and Verbal shift change report given to Dexter Rivera (oncoming nurse) by Che Butler (offgoing nurse). Report included the following information SBAR, Kardex, Intake/Output, Accordion, Recent Results and Cardiac Rhythm NSR.

## 2017-02-18 NOTE — ED NOTES
Bedside shift change report given to Roger Procotr (oncoming nurse) by Kristel Dan RN (offgoing nurse). Report included the following information SBAR, Kardex, ED Summary, Procedure Summary, Intake/Output, MAR, Recent Results and Cardiac Rhythm NSR.

## 2017-02-18 NOTE — ED NOTES
Assumed care of patient. Introduced self as primary nurse. No new complaints at this time. Bed in low locked position with call bell within reach.

## 2017-02-18 NOTE — ED NOTES
Bedside and Verbal shift change report given to Dee Cowan (oncoming nurse) by José Miguel Delvalle (offgoing nurse). Report included the following information SBAR, Kardex, ED Summary, Intake/Output, MAR and Recent Results.

## 2017-02-18 NOTE — ED PROVIDER NOTES
HPI Comments: Merle Godfrey is a 66 y.o. female who presents ambulatory with son to ER with c/o syncopal episode x 6PM this evening. Pt states that she was standing outside talking to her neighbor when she suddenly started feeling lightheaded, nauseated, flush, and like she was going to pass out. States next thing she knows shes on the ground. States her neighbor reported that she had a syncopal episode and he caught her and lowered her to the ground. States pt was unconscious for \"2-3 minutes\". Pt was incontinent of urine during syncopal episode. No bowel incontinence. Pt states she remembers none of this and states she just remembers feeling like she was going to pass out then came to \"groggy\", shaky and nauseated. States neighbor helped her into the house where she sat in the living room and drank some water. States subsequently vomited 10mins later. States tried eating a peanut butter sandwich shortly after which she vomited up immediately as well prompting son to bring pt to ER. Pt notes generally feeling \"not well\", generally weak and shaky, and nauseated currently. No CP, SOB, abd pain, headache, weakness, blurred vision, and any other complaints. No hx of similar episodes/sx in the past. No hx seizures. No alcohol or illicit drug use. Notes had breakfast at 9AM this morning then a breakfast bar around 1230PM and small amount of water throughout the day otherwise hasn't eaten/drank much all day. She specifically denies any fevers, chills, chest pain, shortness of breath, headache, rash, diarrhea, abdominal pain, urinary/bowel changes, or weight loss.     PCP: Sheri Man MD   PMHx significant for: Past Medical History:    Polio                                                         PSHx significant for: Past Surgical History:    NAVEEN BIOPSY BREAST STEREOTACTIC                  Right 1980            Comment:negative     No Known Allergies    There are no other complaints, changes or physical findings at this time.      The history is provided by the patient and a relative. Past Medical History:   Diagnosis Date    Polio        Past Surgical History:   Procedure Laterality Date    Shadi biopsy breast stereotactic Right 1980     negative         No family history on file. Social History     Social History    Marital status:      Spouse name: N/A    Number of children: N/A    Years of education: N/A     Occupational History    Not on file. Social History Main Topics    Smoking status: Never Smoker    Smokeless tobacco: Not on file    Alcohol use No    Drug use: No    Sexual activity: Not on file     Other Topics Concern    Not on file     Social History Narrative         ALLERGIES: Review of patient's allergies indicates no known allergies. Review of Systems   Constitutional: Positive for chills. Negative for appetite change, fatigue and fever. HENT: Negative. Negative for congestion and sore throat. Eyes: Negative. Negative for visual disturbance. Respiratory: Negative. Negative for cough and shortness of breath. Cardiovascular: Positive for syncope. Negative for chest pain, palpitations and leg swelling. Gastrointestinal: Positive for nausea and vomiting. Negative for abdominal pain, constipation and diarrhea. Genitourinary: Negative. Negative for dysuria, flank pain and hematuria. Musculoskeletal: Negative. Negative for back pain and neck pain. Skin: Negative. Negative for rash. Neurological: Positive for syncope and light-headedness. Negative for dizziness, weakness, numbness and headaches. Hematological: Negative. Psychiatric/Behavioral: Negative. All other systems reviewed and are negative. Vitals:    02/17/17 1915   BP: (!) 138/98   Pulse: 77   Resp: 16   Temp: 97.8 °F (36.6 °C)   SpO2: 100%   Weight: 73.9 kg (163 lb)   Height: 5' 3\" (1.6 m)            Physical Exam   Constitutional: She is oriented to person, place, and time.  She appears well-developed and well-nourished. No distress. HENT:   Head: Normocephalic and atraumatic. Right Ear: Hearing, tympanic membrane, external ear and ear canal normal.   Left Ear: Hearing, tympanic membrane, external ear and ear canal normal.   Nose: Nose normal.   Mouth/Throat: Uvula is midline, oropharynx is clear and moist and mucous membranes are normal.   Eyes: Conjunctivae and EOM are normal. Pupils are equal, round, and reactive to light. Neck: Normal range of motion. Neck supple. Cardiovascular: Normal rate, regular rhythm, S1 normal, S2 normal, normal heart sounds, intact distal pulses and normal pulses. Exam reveals no gallop and no friction rub. No murmur heard. Pulses:       Dorsalis pedis pulses are 2+ on the right side, and 2+ on the left side. Pulmonary/Chest: Effort normal and breath sounds normal. No accessory muscle usage. No respiratory distress. She has no decreased breath sounds. She has no wheezes. She has no rhonchi. She has no rales. Abdominal: Soft. Normal appearance and bowel sounds are normal. She exhibits no distension. There is no hepatosplenomegaly. There is no tenderness. There is no rebound, no guarding and no CVA tenderness. Musculoskeletal: Normal range of motion. She exhibits no edema or tenderness. No midline cervical, thoracic, or lumbar spinal tenderness to palpation. FROM spine and all joints/extremities in ER without difficulty. Ambulatory in ER without difficulty. Neurological: She is alert and oriented to person, place, and time. She has normal strength. No sensory deficit. No focal neurologic deficit. Strength 5/5 and equal bilateral upper and lower extremities. NVI all extremities, brisk cap refill all extremities. DP pulse 2+ bilaterally. Radial pulse 2+ bilaterally. Skin: Skin is warm and dry. No rash noted. She is not diaphoretic. No erythema. No pallor. Psychiatric: She has a normal mood and affect.  Her behavior is normal.   Nursing note and vitals reviewed. MDM  Number of Diagnoses or Management Options  Syncope and collapse:   Diagnosis management comments: DDx: ACS, electrolyte imbalance, dehydration, TIA/CVA       Amount and/or Complexity of Data Reviewed  Clinical lab tests: ordered and reviewed  Tests in the radiology section of CPT®: ordered and reviewed  Obtain history from someone other than the patient: yes (Son )  Review and summarize past medical records: yes  Discuss the patient with other providers: yes (Dr. Lucy Sims; Dr. Joshua Benavides)  Independent visualization of images, tracings, or specimens: yes    Patient Progress  Patient progress: stable    ED Course       Procedures           EKG interpretation: (Preliminary)  Rhythm: normal sinus rhythm; and regular . Rate (approx.): 72; Axis: normal; NH interval: normal; QRS interval: normal ; ST/T wave: normal; Other findings: normal. Dyana Isaac PA-C             7:40 PM   Dyana Isaac PA-C discussed patient with Alexia Ruiz MD who is in agreement with care plan as outlined. Will be in to see the patient. No further recommendations. Dyana Isaac PA-C       9:00PM  Alexia uRiz MD in to see pt. Recommends adding on ddimer, otherwise agrees with care plan as discussed. Further Recommends admission to hospitalist. Dyana Isaac PA-C     CONSULT NOTE:   9:25 PM  Dyana Isaac PA-C  spoke with Dr. Joshua Benavides,   Specialty: hospitalist  Discussed pt's hx, disposition, and available diagnostic and imaging results. Reviewed care plans. Consultant agrees with plans as outlined. Will admit patient. Dyana Isaac PA-C      9:25 PM  Patient is being admitted to the hospital.  The results of their tests and reasons for their admission have been discussed with them and/or available family. They convey agreement and understanding for the need to be admitted and for their admission diagnosis.   Consultation has been made with the inpatient physician specialist for hospitalization. LABORATORY TESTS:  Recent Results (from the past 12 hour(s))   GLUCOSE, POC    Collection Time: 02/17/17  7:36 PM   Result Value Ref Range    Glucose (POC) 127 (H) 65 - 100 mg/dL    Performed by RATNA BRENNAN    CBC WITH AUTOMATED DIFF    Collection Time: 02/17/17  7:52 PM   Result Value Ref Range    WBC 10.4 3.6 - 11.0 K/uL    RBC 4.72 3.80 - 5.20 M/uL    HGB 14.3 11.5 - 16.0 g/dL    HCT 43.9 35.0 - 47.0 %    MCV 93.0 80.0 - 99.0 FL    MCH 30.3 26.0 - 34.0 PG    MCHC 32.6 30.0 - 36.5 g/dL    RDW 12.7 11.5 - 14.5 %    PLATELET 379 815 - 435 K/uL    NEUTROPHILS 85 (H) 32 - 75 %    LYMPHOCYTES 8 (L) 12 - 49 %    MONOCYTES 7 5 - 13 %    EOSINOPHILS 0 0 - 7 %    BASOPHILS 0 0 - 1 %    ABS. NEUTROPHILS 8.8 (H) 1.8 - 8.0 K/UL    ABS. LYMPHOCYTES 0.9 0.8 - 3.5 K/UL    ABS. MONOCYTES 0.7 0.0 - 1.0 K/UL    ABS. EOSINOPHILS 0.0 0.0 - 0.4 K/UL    ABS. BASOPHILS 0.0 0.0 - 0.1 K/UL   METABOLIC PANEL, COMPREHENSIVE    Collection Time: 02/17/17  7:52 PM   Result Value Ref Range    Sodium 139 136 - 145 mmol/L    Potassium 3.9 3.5 - 5.1 mmol/L    Chloride 101 97 - 108 mmol/L    CO2 29 21 - 32 mmol/L    Anion gap 9 5 - 15 mmol/L    Glucose 115 (H) 65 - 100 mg/dL    BUN 17 6 - 20 MG/DL    Creatinine 0.76 0.55 - 1.02 MG/DL    BUN/Creatinine ratio 22 (H) 12 - 20      GFR est AA >60 >60 ml/min/1.73m2    GFR est non-AA >60 >60 ml/min/1.73m2    Calcium 8.7 8.5 - 10.1 MG/DL    Bilirubin, total 0.3 0.2 - 1.0 MG/DL    ALT (SGPT) 39 12 - 78 U/L    AST (SGOT) 23 15 - 37 U/L    Alk.  phosphatase 66 45 - 117 U/L    Protein, total 7.1 6.4 - 8.2 g/dL    Albumin 3.9 3.5 - 5.0 g/dL    Globulin 3.2 2.0 - 4.0 g/dL    A-G Ratio 1.2 1.1 - 2.2     CK W/ CKMB & INDEX    Collection Time: 02/17/17  7:52 PM   Result Value Ref Range    CK 90 26 - 192 U/L    CK - MB <1.0 <3.6 NG/ML    CK-MB Index CANNOT BE CALCULATED 0 - 2.5     TROPONIN I    Collection Time: 02/17/17  7:52 PM   Result Value Ref Range    Troponin-I, Qt. <0.04 <0.05 ng/mL MAGNESIUM    Collection Time: 02/17/17  7:52 PM   Result Value Ref Range    Magnesium 2.0 1.6 - 2.4 mg/dL       IMAGING RESULTS:  CT HEAD WO CONT   Final Result      XR CHEST PA LAT   Final Result        Xr Chest Pa Lat    Result Date: 2/17/2017  INDICATION:  syncope. EXAM: 2 VIEW CHEST RADIOGRAPH. COMPARISON: None. FINDINGS: Frontal and lateral views of the chest show clear lungs. . The heart, mediastinum and pulmonary vasculature are normal.  The bony thorax is unremarkable for age. .. IMPRESSION:  No acute cardiopulmonary disease radiographically. .  . Ct Head Wo Cont    Result Date: 2/17/2017  EXAM:  CT HEAD WO CONT INDICATION:   Syncope/fainting today. No head trauma. History of polio. COMPARISON: None. TECHNIQUE: Unenhanced CT of the head was performed using 5 mm images. Brain and bone windows were generated. CT dose reduction was achieved through use of a standardized protocol tailored for this examination and automatic exposure control for dose modulation. FINDINGS: The ventricles and sulci are normal in size, shape and configuration and midline. Mild periventricular white matter low density in the periatrial regions suggests mild microvascular ischemic change. . There is no intracranial hemorrhage, extra-axial collection, mass, mass effect or midline shift. The basilar cisterns are open. No acute infarct is identified. The bone windows demonstrate no abnormalities. The visualized portions of the paranasal sinuses and mastoid air cells are clear. IMPRESSION: No acute intracranial abnormality. MEDICATIONS GIVEN:  Medications   sodium chloride 0.9 % bolus infusion 1,000 mL (1,000 mL IntraVENous New Bag 2/17/17 2034)   ondansetron (ZOFRAN) injection 4 mg (4 mg IntraVENous Given 2/17/17 1956)       IMPRESSION:  1. Syncope and collapse        PLAN:  1.  Admit to hospitalist

## 2017-02-18 NOTE — CONSULTS
[facility] [address]   [place] [address]   [place], [address]    [id number]   [phone] Fax         Dictated  Seems like cardiogenic syncope   Exam and mri nml  Get doppler and EEG  Will f/u     S.  MD Surinder

## 2017-02-18 NOTE — ED TRIAGE NOTES
Patient arrives with son, with c/o passing out, denies fall, states neighbor caught her.  States she was out for 2-3 minutes    Unable to obtain BP or temp, brought back to ED 1

## 2017-02-18 NOTE — ED NOTES
Assumed care of patient. Patient sleeping at this time on hospital bed, in no distress; v/s stable. Will continue to monitor.

## 2017-02-19 VITALS
TEMPERATURE: 98.2 F | HEIGHT: 64 IN | RESPIRATION RATE: 18 BRPM | OXYGEN SATURATION: 95 % | SYSTOLIC BLOOD PRESSURE: 144 MMHG | BODY MASS INDEX: 28.27 KG/M2 | DIASTOLIC BLOOD PRESSURE: 70 MMHG | WEIGHT: 165.57 LBS | HEART RATE: 76 BPM

## 2017-02-19 LAB
CHOLEST SERPL-MCNC: 204 MG/DL
HDLC SERPL-MCNC: 79 MG/DL
HDLC SERPL: 2.6 {RATIO} (ref 0–5)
LDLC SERPL CALC-MCNC: 107.8 MG/DL (ref 0–100)
LIPID PROFILE,FLP: ABNORMAL
TRIGL SERPL-MCNC: 86 MG/DL (ref ?–150)
VLDLC SERPL CALC-MCNC: 17.2 MG/DL

## 2017-02-19 PROCEDURE — 80061 LIPID PANEL: CPT | Performed by: INTERNAL MEDICINE

## 2017-02-19 PROCEDURE — 74011250637 HC RX REV CODE- 250/637: Performed by: INTERNAL MEDICINE

## 2017-02-19 PROCEDURE — 99218 HC RM OBSERVATION: CPT

## 2017-02-19 PROCEDURE — 65390000012 HC CONDITION CODE 44 OBSERVATION

## 2017-02-19 PROCEDURE — 95816 EEG AWAKE AND DROWSY: CPT | Performed by: PSYCHIATRY & NEUROLOGY

## 2017-02-19 PROCEDURE — 96372 THER/PROPH/DIAG INJ SC/IM: CPT

## 2017-02-19 PROCEDURE — 74011250636 HC RX REV CODE- 250/636: Performed by: INTERNAL MEDICINE

## 2017-02-19 PROCEDURE — 93880 EXTRACRANIAL BILAT STUDY: CPT

## 2017-02-19 PROCEDURE — 36415 COLL VENOUS BLD VENIPUNCTURE: CPT | Performed by: INTERNAL MEDICINE

## 2017-02-19 PROCEDURE — 96361 HYDRATE IV INFUSION ADD-ON: CPT

## 2017-02-19 RX ORDER — PRAVASTATIN SODIUM 20 MG/1
40 TABLET ORAL
Status: DISCONTINUED | OUTPATIENT
Start: 2017-02-19 | End: 2017-02-19 | Stop reason: HOSPADM

## 2017-02-19 RX ORDER — GUAIFENESIN 100 MG/5ML
81 LIQUID (ML) ORAL DAILY
Status: DISCONTINUED | OUTPATIENT
Start: 2017-02-19 | End: 2017-02-19 | Stop reason: HOSPADM

## 2017-02-19 RX ORDER — PRAVASTATIN SODIUM 40 MG/1
40 TABLET ORAL
Qty: 30 TAB | Refills: 0 | Status: SHIPPED | OUTPATIENT
Start: 2017-02-19 | End: 2020-12-05

## 2017-02-19 RX ORDER — GUAIFENESIN 100 MG/5ML
81 LIQUID (ML) ORAL DAILY
Qty: 30 TAB | Refills: 0 | Status: SHIPPED | OUTPATIENT
Start: 2017-02-19 | End: 2022-07-03

## 2017-02-19 RX ADMIN — ASPIRIN 81 MG CHEWABLE TABLET 81 MG: 81 TABLET CHEWABLE at 13:03

## 2017-02-19 RX ADMIN — ENOXAPARIN SODIUM 40 MG: 40 INJECTION SUBCUTANEOUS at 00:26

## 2017-02-19 RX ADMIN — SODIUM CHLORIDE 75 ML/HR: 900 INJECTION, SOLUTION INTRAVENOUS at 04:39

## 2017-02-19 RX ADMIN — Medication 10 ML: at 13:04

## 2017-02-19 RX ADMIN — Medication 10 ML: at 00:26

## 2017-02-19 NOTE — CONSULTS
Dipesh Lazo damion [place] [address]   [address] [facility], [address]   [address]       Name:  Miguelangel Gonzalez   MR#:  [id number]   :  1945   Account #:  [de-identified]    Date of Consultation:  2017   Date of Adm:  2017       REQUESTING PHYSICIAN: Stephanie Salvador MD.     HISTORY OF PRESENT ILLNESS: Thank you for asking us to see   this patient in neurologic consultation regarding an acute change in her   neurologic status - loss of consciousness. She is a pleasant 69-year-  old lady and she provides history. Her son, who is a SHEREE BACH [employer], is also at the bedside and provides history as well. This nice lady was out in her yard today and she was talking to her   neighbor, Eh Ag. She notes that she started to get hot and she took off   her sweater. She then started to sweat profusely, noting that her   blouse and her other clothing became wet. She felt lightheaded and   she told Eh Ag she thought she was going to faint. That was her last   recollection until she remembers being helped into the house to lay   down on the couch. As Katlynsugey Kimberli told her son, she started to faint, and Eh Ag   was sitting in the car at the time and the patient was standing outside   of the car. He was unable to get out of the vehicle, although the door   was open, in time to catch her, but she fell forward towards him. He   then supported her in a semi-upright position. She never got horizontal.   Per the story I get from her son, she was unresponsive for 3-5 minutes. There was no shaking. The patient tells me she does not remember   going in the house, although she vaguely remembers the front steps. She remembers lying down on the couch. Donaldjeffrey Ag called her son, and her   son came over and thought perhaps her sugar had dropped secondary   to the fact that she had not eaten her lunch. He gave her a bit of a   peanut butter sandwich and she started to vomit.  She had actually   vomited prior to trying to eat anything when she was given some water   by Christy Muñoz. Somewhere along the line she lost her water, although she is   unsure as to when that was. She only realized it when she went to get   up off of the couch to go have a bowel movement. She is back to her   normal state at this point. She does say that she fainted once before   while she was in the emergency department here with her grandson,   who was getting his leg sutured. She had the same type of prodrome. She remembers standing beside her grandson while he was being   sutured, and she began to get hot and sweaty and her last recollection   was saying that she was going to faint. Then her next recollection was   awakening in a hospital bed on a monitor, etc. She also has had a   history of feeling lightheaded as though she may faint when she gets   upset or anxious or when she is to have blood drawn, etc. Interestingly,   she does say that over the last couple of weeks she has awakened in   the middle of the night feeling a very rapid heart rate. She says it just   does not feel normal. It beats very fast. It lasts for a couple minutes or   so and then goes away. She did not have that feeling today when this   event occurred. She has never had a seizure in her life. She has never had any CNS   infections, such as meningitis or encephalitis. She has not had any   head trauma with loss of consciousness. She has no family history of   seizures or epilepsy. She has had no changes in her medications. No   lifestyle changes. No substances. No inciting factor. PAST MEDICAL HISTORY: Polio, and that has left her with a bit of   weakness in the left lower extremity, although she does everything she   needs to do. The left foot is smaller than the right. She has had a   breast biopsy in the past as well. MEDICATIONS: Her medicines at home include glucosamine and that   is all she takes. ALLERGIES: NONE. SOCIAL HISTORY: She lives alone.  She is very active. She does not   drink or smoke. She uses no substances. FAMILY HISTORY: Noncontributory. No neurologic issues. PHYSICAL EXAMINATION:   GENERAL: She is a very pleasant lady, sitting in bed. Son at the   bedside. She is eating dinner. VITAL SIGNS: She is afebrile, pulse is 91, blood pressure 142/69, and   oxygen saturation is 96% on room air. HEENT: Sclerae anicteric. Oropharynx clear and moist.   NECK: Supple. HEART: Regular. EXTREMITIES: No edema. NEUROLOGIC: She is awake, alert, oriented and conversant. She has   normal speech, language, and cognition. Cranial nerves intact, 2-12. No nystagmus. She has age-related paratonia throughout. She has the   left foot that has stigmata of post-polio changes. No other atrophy,   except in the foot. She resists fully in the upper and lower extremities   in all muscle groups to direct testing with some mild weakness in the   anterior tibialis on the left. Reflexes are globally depressed, but   symmetrical. She has no ataxia. Sensory intact to primary. DIAGNOSTIC DATA: MRI of the brain is reviewed and that is   unrevealing. Troponin is normal. CBC normal. Her metabolic panel is unremarkable. Urinalysis with some trace ketonuria and hematuria. Thyroid finds a   normal TSH. Magnesium was fine. CK total was 90, her CK-MB is fine. IMPRESSION: A 54-year-old lady with an episode of loss of   consciousness, which by her description had quite the classic   prodrome for cardiogenic-type syncope. The issue in terms of the   duration is the fact that she was not allowed to get horizontal, although   we do have historically she had another fainting episode, which   sounded vasovagal type while her grandson was being sutured, and   she has no recollection of anything until she awakened in a hospital   bed, which would again portend that she has had prolonged events in   the past. Again my suspicion for ictus is quite low.  We will go ahead   and get a carotid Doppler, and we will also get an EEG to be complete. She has been seen by Dr. Deni Norris of Cardiology, and certainly I   agree with him that if telemetry does not find any abnormality in her   heart rate, given her complaints of palpitations recently, that a 30-day   event monitor would be in order on discharge. Will order up the Doppler and the EEG. Will have those done overnight   or tomorrow, and hopefully if she has no further events we will be able   to let her go home tomorrow afternoon after those studies have been   completed if they are normal.     Thanks for your request for consultation.         MD BETY Contreras / Heath Lozano   D:  02/18/2017   17:16   T:  02/18/2017   21:27   Job #:  [id number]

## 2017-02-19 NOTE — DISCHARGE SUMMARY
Physician Discharge Summary     Patient ID:  Tracy Morales  [id number]  56 y.o.  1945    Admit date: 2/17/2017    Discharge date and time: 2/19/2017    Admission Diagnoses: Syncope  Syncope    Discharge Diagnoses:    Principal Diagnosis   <principal problem not specified>                                             Other Diagnoses  Active Problems:    Syncope (2/17/2017)      Urinary incontinence (2/18/2017)         Hospital Course:     Syncope (2/17/2017): likely cardiogenic  -- TTE wnl, EF okay, no AS  -- Appreciate neuro consult  -- DC if EEG okay     Paroxysmal Afib: Had bradycardia with afib x 1 episode, no recurrence. No prior hx of afib or cardiology eval. [id number] score is 3 for age and gender. -- TTE with good LVEF, no LA dilation  -- cardiology consult appreciated. Defer anticoag discussion/decision to outpatient and event monitor  -- Needs event monitor as outpatient     Carotid artery stenosis / Atherosclerotic disease of right carotid artery: noted 50-69% on R ICA. -- Start ASA 81 mg and moderate potency statin (hx of  myalgia so will start pravastatin 40 mg)       PCP: Arya Freeman MD    Consults: Cardiology and Neurology    Significant Diagnostic Studies: See Hospital Course    Discharged home in improved condition.     Discharge Exam:    Visit Vitals    /70 (BP 1 Location: Right arm, BP Patient Position: At rest)    Pulse 76    Temp 98.2 °F (36.8 °C)    Resp 18    Ht 5' 4\" (1.626 m)    Wt 75.1 kg (165 lb 9.1 oz)    SpO2 95%    BMI 28.42 kg/m2     Physical Exam:    Gen: Well-developed, well-nourished, in no acute distress  HEENT:  Pink conjunctivae, hearing intact to voice, moist mucous membranes  Neck: Supple  Resp: No accessory muscle use, clear breath sounds without wheezes rales or rhonchi  Card: No murmurs, normal S1, S2 without thrills or peripheral edema  Abd:  Soft, non-tender, non-distended, normoactive bowel sounds are present  Musc: No cyanosis or clubbing  Skin: No rashes or ulcers, skin turgor is good  Neuro:  Cranial nerves are grossly intact,  strength is 5/5 bilaterally, hip flexion is 5/5 bilaterally, follows commands appropriately  Psych:  Good insight, oriented to person, place and time, alert      Disposition: home    Patient Instructions:   Current Discharge Medication List      START taking these medications    Details   aspirin 81 mg chewable tablet Take 1 Tab by mouth daily. Qty: 30 Tab, Refills: 0      pravastatin (PRAVACHOL) 40 mg tablet Take 1 Tab by mouth nightly. Qty: 30 Tab, Refills: 0         CONTINUE these medications which have NOT CHANGED    Details   glucosamine-chondroitin (ARTHX) 500-400 mg cap Take 2 Caps by mouth daily.            Activity: Activity as tolerated  Diet: Low fat, Low cholesterol  Wound Care: None needed    Follow-up with PCP in 2 weeks and Cardiology as directed (number provided in DC instructions)    Signed:  Almsa Jules DO  2/19/2017  11:57 AM    Greater than 30 mins was spent in coordination, counseling, and execution of this patient's discharge

## 2017-02-19 NOTE — PROGRESS NOTES
Bedside and Verbal shift change report given to Kenneth Acevedo (oncoming nurse) by Anabella Richter (offgoing nurse). Report included the following information SBAR, Kardex, ED Summary, Intake/Output, MAR, Accordion, Recent Results, Med Rec Status and Cardiac Rhythm NSR. Bedside and Verbal shift change report given to Stefany Eddy (oncoming nurse) by Kenneth Acevedo (offgoing nurse). Report included the following information SBAR, Kardex, ED Summary, Procedure Summary, Intake/Output, MAR, Accordion, Recent Results, Med Rec Status and Cardiac Rhythm NSR.

## 2017-02-19 NOTE — PROGRESS NOTES
Medical Progress Note      NAME: Merle Godfrey   :  1945  MRM:  [id number]    Date/Time: 2017  12:15 PM       Assessment / Plan:     Syncope (2017): likely cardiogenic   -- TTE wnl, EF okay, no AS   -- Appreciate neuro consult   -- DC if EEG okay    Paroxysmal Afib:  Had bradycardia with afib x 1 episode, no recurrence. No prior hx of afib or cardiology eval.  [id number] score is 3 for age and gender. -- TTE with good LVEF, no LA dilation   -- cardiology consult appreciated. Defer anticoag discussion/decision to outpatient and event monitor   -- Needs event monitor as outpatient    Carotid artery stenosis / Atherosclerotic disease of right carotid artery: noted 50-69% on R ICA. -- Start ASA 81 mg and moderate potency statin (hx of  myalgia so will start pravastatin 40 mg)           Subjective:     Chief Complaint:  Feels well. No recurrence of syncope. Discussed Carotid duplex with patient    ROS:  (bold if positive, if negative)              Objective:       Vitals:          Last 24hrs VS reviewed since prior progress note.  Most recent are:    Visit Vitals    /70 (BP 1 Location: Right arm, BP Patient Position: At rest)    Pulse 76    Temp 98.2 °F (36.8 °C)    Resp 18    Ht 5' 4\" (1.626 m)    Wt 75.1 kg (165 lb 9.1 oz)    SpO2 95%    BMI 28.42 kg/m2     SpO2 Readings from Last 6 Encounters:   17 95%   16 96%            Intake/Output Summary (Last 24 hours) at 17 1136  Last data filed at 17 0442   Gross per 24 hour   Intake              840 ml   Output             2200 ml   Net            -1360 ml          Exam:     Physical Exam:    Gen:  Well-developed, well-nourished, in no acute distress  HEENT:  Pink conjunctivae, hearing intact to voice, moist mucous membranes  Neck:  Supple, no bruits  Resp:  No accessory muscle use, clear breath sounds without wheezes rales or rhonchi  Card:  No murmurs, normal S1, S2 without thrills or peripheral edema  Abd: Soft, non-tender, non-distended, normoactive bowel sounds are present  Musc:  No cyanosis  Skin:  No rashes, skin turgor is good  Neuro:  Cn 3-12 grossly intact,  strength is 5/5 bilaterally and dorsi / plantarflexion is 5/5 bilaterally, follows commands appropriately  Psych:  Good insight, oriented to person, place and time, alert       Telemetry reviewed:   Normal sinus rhythm     Medications Reviewed: (see below)    Lab Data Reviewed: (see below)    ______________________________________________________________________    Medications:     Current Facility-Administered Medications   Medication Dose Route Frequency    aspirin chewable tablet 81 mg  81 mg Oral DAILY    pravastatin (PRAVACHOL) tablet 40 mg  40 mg Oral QHS    sodium chloride (NS) flush 5-10 mL  5-10 mL IntraVENous Q8H    sodium chloride (NS) flush 5-10 mL  5-10 mL IntraVENous PRN    0.9% sodium chloride infusion  75 mL/hr IntraVENous CONTINUOUS    enoxaparin (LOVENOX) injection 40 mg  40 mg SubCUTAneous Q24H            Lab Review:     Recent Labs      02/18/17   0528  02/17/17 1952   WBC  6.8  10.4   HGB  13.7  14.3   HCT  40.6  43.9   PLT  270  268     Recent Labs      02/18/17   0528  02/17/17 1952   NA  144  139   K  3.9  3.9   CL  107  101   CO2  29  29   GLU  95  115*   BUN  11  17   CREA  0.68  0.76   CA  8.1*  8.7   MG   --   2.0   ALB  3.4*  3.9   TBILI  0.5  0.3   SGOT  26  23   ALT  33  39     Lab Results   Component Value Date/Time    Glucose (POC) 127 02/17/2017 07:36 PM         Total time spent with patient: 28 8[address] [facility] discussed with: Patient, Family, Consultant/Specialist and >50% of time spent in counseling and coordination of care    Discussed:  Care Plan and D/C Planning    Prophylaxis:  Lovenox    Disposition:  Home w/Family           ___________________________________________________    Attending Physician: Karla Zhang DO

## 2017-02-19 NOTE — PROGRESS NOTES
Attempted to call Ultrasound department to ascertain time for ordered procedure; left message. 0756:  Called Vascular lab per Ultrasound recommendation; no answer. Left message. 0985:  Paged EEG tech; note says nursing supervisor to call in tech on Sunday. [address] Supervisor notified of need to contact EEG tech. 1305:  Labs drawn per orders. Awaiting results of testing. 1530:  Discharge instructions reviewed with patient; all questions answered. Pt provided with RX for pravastatin and aspirin. All education and care plans completed. Followup appointments reinforced.

## 2017-02-19 NOTE — PROCEDURES
[address]  *** FINAL REPORT ***    Name: Bay Cuevas  MRN: [id number]    Inpatient  : 1945  HIS Order #: [id number]  [address] [facility] Visit #: [id number]  Date: 2017    TYPE OF TEST: Cerebrovascular Duplex    REASON FOR TEST  Syncope    Right Carotid:-             Proximal               Mid                 Distal  cm/s  Systolic  Diastolic  Systolic  Diastolic  Systolic  Diastolic  CCA:    930.7      29.3                           114.0      33.3  Bulb:  ECA:     96.3       9.6  ICA:     72.6      23.4      131.7      43.1      168.8      43.8  ICA/CCA:  0.6       0.7    ICA Stenosis: 50-69%    Right Vertebral:-  Finding: Antegrade  Sys:       48.3  Jenny:       14.4    Right Subclavian:    Left Carotid:-            Proximal                Mid                 Distal  cm/s  Systolic  Diastolic  Systolic  Diastolic  Systolic  Diastolic  CCA:    265.5      33.2                           108.4      27.6  Bulb:  ECA:    106.6      20.7  ICA:    106.6      34.6       75.4      25.4       90.4      30.0  ICA/CCA:  1.0       1.3    ICA Stenosis: <50%    Left Vertebral:-  Finding: Antegrade  Sys:       85.7  Jenny:       20.7    Left Subclavian:    INTERPRETATION/FINDINGS  PROCEDURE:  Evaluation of the extracranial cerebrovascular arteries  with ultrasound (B-mode imaging, pulsed Doppler, color Doppler). Includes the common carotid, internal carotid, external carotid, and  vertebral arteries. FINDINGS: Intimal thickening noted bilaterally. Calcific plaque noted  bilaterally in the proximal carotid artery and bulb. Internal carotid   artery is tortuos bilaterally. High velocity noted on right mid  internal carotid artery though no plaque visulaized. IMPRESSION: Findings are consistent with 50-69%  stenosis of the right   internal carotid and 0-49% stenosis of the left internal carotid. Vertebrals are patent with antegrade flow.     ADDITIONAL COMMENTS    I have personally reviewed the data relevant to the interpretation of  this  study. TECHNOLOGIST: Bro Brower RVT  Signed: 02/19/2017 10:58 AM    PHYSICIAN: Ricke Babinski.  Maximino Enriquez MD  Signed: 02/20/2017 10:24 AM

## 2017-02-19 NOTE — PROGRESS NOTES
[facility] [place] NEUROLOGY [address]   [place] [address]   [address]   [phone] [facility]   [phone] Fax         Syncope with urinary incontinence  Hx of palpitations    No events overnight  Seen by Dr Ty Villegas and plans for OP event monitor  EEG reviewed/dictated and completely normal  Doppler with 50 -69% right lower end and less than 50% left  MRI brain nml    Son and pt at bedside and we discussed her results    Exam  Visit Vitals    /70 (BP 1 Location: Right arm, BP Patient Position: At rest)    Pulse 76    Temp 98.2 °F (36.8 °C)    Resp 18    Ht 5' 4\" (1.626 m)    Wt 75.1 kg (165 lb 9.1 oz)    SpO2 95%    BMI 28.42 kg/m2     Awake, alert and oriented  nml speech, language and cognition  No focal    Imp/plan  Syncope likely cardiogenic  Follow in office in 6 months for doppler repeat   Follow with cardiology for event monitor    Ok with me for discharge home form Neurologic perspective    DESIRAE Cadena MD

## 2017-02-20 LAB
ATRIAL RATE: 72 BPM
CALCULATED P AXIS, ECG09: -10 DEGREES
CALCULATED R AXIS, ECG10: -3 DEGREES
CALCULATED T AXIS, ECG11: 13 DEGREES
DIAGNOSIS, 93000: NORMAL
P-R INTERVAL, ECG05: 180 MS
Q-T INTERVAL, ECG07: 394 MS
QRS DURATION, ECG06: 76 MS
QTC CALCULATION (BEZET), ECG08: 431 MS
VENTRICULAR RATE, ECG03: 72 BPM

## 2017-02-20 NOTE — PROCEDURES
Dipesh Lazo [facility]   [address] [facility], [address]   ROUTINE EEG REPORT       Name:  Varghese Castaneda   MR#:  [id number]   :  1945   Account #:  [de-identified]    Date of Procedure:  2017   Date of Adm:  2017       REQUESTING PHYSICIAN: Anjelica Cadena MD.    INDICATIONS: An EEG is requested in this 68-year-old woman with   syncope to evaluate for epileptiform abnormality. MEDICATIONS: Listed as Glucosamine. DESCRIPTION: This tracing is obtained during the awake state. During this state, there are intermittent runs of posteriorly dominant   symmetrical low to medium amplitude at 11 cycle per second activities,   which attenuate with eye opening. Lower voltage, faster frequency   activities are seen symmetrically over the anterior head regions. Hyperventilation is not performed secondary to her age and medical   history. Intermittent photic stimulation induces symmetric posterior driving   responses. Sleep is not attained. INTERPRETATION: This electroencephalogram recorded during the   awake state is normal. No epileptiform abnormalities are seen.         Lauren Silva MD      SLE / RA   D:  2017   13:57   T:  2017   07:28   Job #:  [id number]

## 2017-02-21 NOTE — PROGRESS NOTES
2/21/2017 11:37 AM Condition 44 letter printed and sent to Wanda Mack, supervisor with [employer] to be attached to pt's chart.    MICHELLE De La Cruz

## 2017-02-22 ENCOUNTER — CLINICAL SUPPORT (OUTPATIENT)
Dept: CARDIOLOGY CLINIC | Age: 72
End: 2017-02-22

## 2017-02-22 DIAGNOSIS — R55 SYNCOPE, UNSPECIFIED SYNCOPE TYPE: Primary | ICD-10-CM

## 2017-02-22 NOTE — PROGRESS NOTES
Mailed pt eCardio loop per Dr Marcia Mixon  Dx: syncope. Pt was accidentally schedule in office for holter per the first order he did in ER but after hosp stay it was changed.

## 2017-04-04 ENCOUNTER — OFFICE VISIT (OUTPATIENT)
Dept: CARDIOLOGY CLINIC | Age: 72
End: 2017-04-04

## 2017-04-04 VITALS
RESPIRATION RATE: 19 BRPM | HEIGHT: 64 IN | DIASTOLIC BLOOD PRESSURE: 84 MMHG | SYSTOLIC BLOOD PRESSURE: 125 MMHG | HEART RATE: 76 BPM | WEIGHT: 167 LBS | OXYGEN SATURATION: 95 % | BODY MASS INDEX: 28.51 KG/M2

## 2017-04-04 DIAGNOSIS — R55 SYNCOPE, UNSPECIFIED SYNCOPE TYPE: Primary | ICD-10-CM

## 2017-04-04 DIAGNOSIS — E78.5 DYSLIPIDEMIA (HIGH LDL; LOW HDL): ICD-10-CM

## 2017-04-04 NOTE — MR AVS SNAPSHOT
Visit Information Date & Time Provider Department Dept. Phone Encounter #  
 4/4/2017 10:00 AM Kassandra Amanda MD [facility] Trini Bruce [phone] [id number] Follow-up Instructions Return in about 6 months (around 10/4/2017). Your Appointments 8/9/2017  8:00 AM  
ULTRASOUND with [facility] [address]) Appt Note: doppler per akash (6month) [place] [address] [place] [address] [phone]  
  
   
 [place] [address] [place] [id number]  
  
    
 8/9/2017  9:40 AM  
Follow Up with Bhaskar Peng MD  
Neurology [facility]) Appt Note: doppler fu per akash (6month) [place] [address] [place] [address] [phone]  
  
   
 [place] [address] [address] [address] Upcoming Health Maintenance Date Due DTaP/Tdap/Td series (1 - Tdap) 11/9/1965 FOBT Q 1 YEAR AGE 50-75 11/9/1994 ZOSTER VACCINE AGE 60> 11/9/2004 GLAUCOMA SCREENING Q2Y 11/9/2009 OSTEOPOROSIS SCREENING (DEXA) 11/9/2009 Pneumococcal 65+ Low/Medium Risk (1 of 2 - PCV13) 11/9/2009 MEDICARE YEARLY EXAM 11/9/2009 INFLUENZA AGE 9 TO ADULT 8/1/2016 BREAST CANCER SCRN MAMMOGRAM 10/17/2018 Allergies as of 4/4/2017  Review Complete On: 4/4/2017 By: Kassandra Amanda MD  
 No Known Allergies Current Immunizations  Never Reviewed No immunizations on file. Not reviewed this visit You Were Diagnosed With   
  
 Codes Comments Syncope, unspecified syncope type    -  Primary ICD-10-CM: R55 
ICD-9-CM: 780. 2 Dyslipidemia (high LDL; low HDL)     ICD-10-CM: E78.4 ICD-9-CM: 272.4 Vitals BP Pulse Resp Height(growth percentile) Weight(growth percentile) SpO2  
 125/84 (BP 1 Location: Right arm, BP Patient Position: Sitting) 76 19 5' 4\" (1.626 m) 167 lb (75.8 kg) 95% BMI OB Status Smoking Status 28.67 kg/m2 Menopause Never Smoker Vitals History BMI and BSA Data Body Mass Index Body Surface Area  
 28.67 kg/m 2 1.85 m 2 Your Updated Medication List  
  
   
This list is accurate as of: 4/4/17 10:26 AM.  Always use your most recent med list.  
  
  
  
  
 aspirin 81 mg chewable tablet Take 1 Tab by mouth daily. glucosamine-chondroitin 500-400 mg Cap Commonly known as:  [address] Take 2 Caps by mouth daily. pravastatin 40 mg tablet Commonly known as:  PRAVACHOL Take 1 Tab by mouth nightly. We Performed the Following HEPATIC FUNCTION PANEL [70749 CPT(R)] LIPID PANEL [03922 CPT(R)] Follow-up Instructions Return in about 6 months (around 10/4/2017). To-Do List   
 04/04/2017 ECG:  STRESS TEST CARDIAC   
  
 04/10/2017 9:00 AM  
  Appointment with Chucho Thomas at [facility] Ultrasound ([phone]) General  NPO DIET RESTICTIONS Please be NPO (nothing by mouth) for 6- 8 hours prior to procedure. GENERAL INSTRUCTIONS 1. Bring any non [facility] facility films/reports pertaining to the area being studied with you on the day of appointment. 2. A written order with a valid diagnosis and Physicians signature is required for all scheduled tests. 3. Check in at registration 30 minutes before your appointment time unless you were instructed to do otherwise. Introducing [facility]! Dear Sarah Rowe: Thank you for requesting a [employer] account. Our records indicate that you already have an active [employer] account. You can access your account anytime at [email or web address] Did you know that you can access your hospital and ER discharge instructions at any time in [employer]? You can also review all of your test results from your hospital stay or ER visit. Additional Information If you have questions, please visit the Frequently Asked Questions section of the [employer] website at [email or web address] Remember, [employer] is NOT to be used for urgent needs. For medical emergencies, dial 911. Now available from your iPhone and Android! Please provide this summary of care documentation to your next provider. Your primary care clinician is listed as MERLE CALVO. If you have any questions after today's visit, please call [phone].

## 2017-04-04 NOTE — COMMUNICATION BODY
Cb Weaver,    I had the opportunity of seeing Ms. De Los Santos in the office today. she had a loop recorder placed with no significant arrhthymias. I believe her sx's are vaso vagal. I will do a regular walking stress test on her prior to her beginning a walking program. She does have a family history of CAD. Will check lipids again in 6 mo.     All the best,    Mendy Santoro

## 2017-04-04 NOTE — LETTER
4/4/2017 10:23 AM 
 
Patient:  Concepcion Newman YOB: 1945 Date of Visit: 4/4/2017 Dear Shazia Dominique MD 
[address] [place] [address] [address] [place] [address] VIA Facsimile: [phone] 
 : 
 
 
Thank you for referring Ms. Concepcion Newman to me for evaluation/treatment. Below are the relevant portions of my assessment and plan of care. Hi Dr. Ashkan Baker, 
 
I had the opportunity of seeing Ms. De Los Santos in the office today. she had a loop recorder placed with no significant arrhthymias. I believe her sx's are vaso vagal. I will do a regular walking stress test on her prior to her beginning a walking program. She does have a family history of CAD. Will check lipids again in 6 mo. All the best, 
 
Jolinda Kawasaki If you have questions, please do not hesitate to call me. I look forward to following Ms. De Los Santos along with you.  
 
 
 
Sincerely, 
 
 
Steve Garcia MD

## 2017-04-04 NOTE — PROGRESS NOTES
LAST OFFICE VISIT : 2/22/2017        ICD-10-CM ICD-9-CM   1. Syncope, unspecified syncope type R55 780.2   2. Dyslipidemia (high LDL; low HDL) E78.4 272.4            Cherise Moore is a 66 y.o. female referred for 6 week follow up from monitor. Cardiac risk factors: post menopausal, family history. I have personally obtained the history from the patient. HISTORY OF PRESENTING ILLNESS      She is doing well currently. She denies any further episodes of syncope since being discharged from hospital. She reports family history of MI in father. She does note minor episodes of SOB on occasion. The patient denies chest pain/ shortness of breath, orthopnea, PND, LE edema, palpitations, syncope, presyncope or fatigue. ACTIVE PROBLEM LIST     Patient Active Problem List    Diagnosis Date Noted    Urinary incontinence 02/18/2017    Syncope 02/17/2017           PAST MEDICAL HISTORY     Past Medical History:   Diagnosis Date    Polio            PAST SURGICAL HISTORY     Past Surgical History:   Procedure Laterality Date    NAVEEN BIOPSY BREAST STEREOTACTIC Right 1980    negative          ALLERGIES     No Known Allergies       FAMILY HISTORY     History reviewed. No pertinent family history. negative for cardiac disease       SOCIAL HISTORY     Social History     Social History    Marital status:      Spouse name: N/A    Number of children: N/A    Years of education: N/A     Social History Main Topics    Smoking status: Never Smoker    Smokeless tobacco: None    Alcohol use No    Drug use: No    Sexual activity: Not Asked     Other Topics Concern    None     Social History Narrative         MEDICATIONS     Current Outpatient Prescriptions   Medication Sig    aspirin 81 mg chewable tablet Take 1 Tab by mouth daily.  pravastatin (PRAVACHOL) 40 mg tablet Take 1 Tab by mouth nightly.  glucosamine-chondroitin (ARTHX) 500-400 mg cap Take 2 Caps by mouth daily.      No current facility-administered medications for this visit. I have reviewed the nurses notes, vitals, problem list, allergy list, medical history, family, social history and medications. REVIEW OF SYMPTOMS      General: Pt denies excessive weight gain or loss. Pt is able to conduct ADL's  HEENT: Denies blurred vision, headaches, hearing loss, epistaxis and difficulty swallowing. Respiratory: Denies cough, congestion, shortness of breath, INFANTE, wheezing or stridor. Cardiovascular: Denies precordial pain, palpitations, edema or PND  Gastrointestinal: Denies poor appetite, indigestion, abdominal pain or blood in stool  Genitourinary: Denies hematuria, dysuria, increased urinary frequency  Musculoskeletal: Denies joint pain or swelling from muscles or joints  Neurologic: Denies tremor, paresthesias, headache, or sensory motor disturbance  Psychiatric: Denies confusion, insomnia, depression  Integumentray: Denies rash, itching or ulcers. Hematologic: Denies easy bruising, bleeding     PHYSICAL EXAMINATION      Vitals:    04/04/17 0954   BP: 125/84   Pulse: 76   Resp: 19   SpO2: 95%   Weight: 167 lb (75.8 kg)   Height: 5' 4\" (1.626 m)     General: Well developed, in no acute distress. HEENT: No jaundice, oral mucosa moist, no oral ulcers  Neck: Supple, no stiffness, no lymphadenopathy, supple  Heart:  Normal S1/S2 negative S3 or S4. Regular, no murmur, gallop or rub, no jugular venous distention  Respiratory: Clear bilaterally x 4, no wheezing or rales    Extremities:  No edema, normal cap refill, no cyanosis. Musculoskeletal: No clubbing, no deformities  Neuro: A&Ox3, speech clear, gait stable, cooperative, no focal neurologic deficits  Skin: Skin color is normal. No rashes or lesions. Non diaphoretic, moist.  Vascular: 2+ pulses symmetric in all extremities             DIAGNOSTIC DATA     1. Echo  2/17/17- EF 60%    2. Lipids  2/19/17- , HDL 79, .8, TG 86    3.  Carotid Doppler  2/19/17- R 50-69%, L 0-49%    4. Loop recorder  2/23/17 - she had no arrhythmias heart rate varied between  bpm         LABORATORY DATA            Lab Results   Component Value Date/Time    WBC 6.8 02/18/2017 05:28 AM    HGB 13.7 02/18/2017 05:28 AM    HCT 40.6 02/18/2017 05:28 AM    PLATELET 556 84/63/7221 05:28 AM    MCV 92.5 02/18/2017 05:28 AM      Lab Results   Component Value Date/Time    Sodium 144 02/18/2017 05:28 AM    Potassium 3.9 02/18/2017 05:28 AM    Chloride 107 02/18/2017 05:28 AM    CO2 29 02/18/2017 05:28 AM    Anion gap 8 02/18/2017 05:28 AM    Glucose 95 02/18/2017 05:28 AM    BUN 11 02/18/2017 05:28 AM    Creatinine 0.68 02/18/2017 05:28 AM    BUN/Creatinine ratio 16 02/18/2017 05:28 AM    GFR est AA >60 02/18/2017 05:28 AM    GFR est non-AA >60 02/18/2017 05:28 AM    Calcium 8.1 02/18/2017 05:28 AM    Bilirubin, total 0.5 02/18/2017 05:28 AM    AST (SGOT) 26 02/18/2017 05:28 AM    Alk. phosphatase 65 02/18/2017 05:28 AM    Protein, total 6.3 02/18/2017 05:28 AM    Albumin 3.4 02/18/2017 05:28 AM    Globulin 2.9 02/18/2017 05:28 AM    A-G Ratio 1.2 02/18/2017 05:28 AM    ALT (SGPT) 33 02/18/2017 05:28 AM           ASSESSMENT/RECOMMENDATIONS:.      1. Syncope  -no further episodes of this   -came into today to review monitor report and it didn't show any significant arrhythmias  -reviewed with her mechanisms to decrease episodes of lightheadedness  -believe most of her symptoms are vaso vagal     2. Dyslipidemia  -Lipids slightly elevated so will check her cholesterol again in 6 months    3. SOB  -will do regular exercise stress test prior to her beiginning a walking program     4.  Follow up in 6 months or PRN    Orders Placed This Encounter    LIVER FUNCTION PANEL    LIPID PANEL    STRESS TEST CARDIAC     Standing Status:   Future     Standing Expiration Date:   10/2/2017     Order Specific Question:   Reason for Exam:     Answer:   chest pain,shortness of breath,fatigue        Follow-up Disposition: Not on File      I have discussed the diagnosis with  Russell Barbour and the intended plan as seen in the above orders. Questions were answered concerning future plans. I have discussed medication side effects and warnings with the patient as well. Thank you,  Ana Ribera MD for involving me in the care of  Russell Barbour. Please do not hesitate to contact me for further questions/concerns. This note was written by opal Ruiz, as dictated by Letty Vick MD.      Juanita Camargo. MD [place], [facility] [address]      [facility], [address] [place] [address]      [phone] / [phone] Fax

## 2017-04-04 NOTE — PROGRESS NOTES
Visit Vitals    /84 (BP 1 Location: Right arm, BP Patient Position: Sitting)    Pulse 76    Resp 19    Ht 5' 4\" (1.626 m)    Wt 167 lb (75.8 kg)    SpO2 95%    BMI 28.67 kg/m2     Pt awaiting test results

## 2017-04-10 ENCOUNTER — HOSPITAL ENCOUNTER (OUTPATIENT)
Dept: ULTRASOUND IMAGING | Age: 72
Discharge: HOME | End: 2017-04-10
Attending: FAMILY MEDICINE
Payer: MEDICARE

## 2017-04-10 DIAGNOSIS — K76.89 LIVER CYST: ICD-10-CM

## 2017-04-10 DIAGNOSIS — K76.89 OTHER SPECIFIED DISORDERS OF LIVER: ICD-10-CM

## 2017-04-10 PROCEDURE — 76700 US EXAM ABDOM COMPLETE: CPT

## 2017-05-08 ENCOUNTER — CLINICAL SUPPORT (OUTPATIENT)
Dept: CARDIOLOGY CLINIC | Age: 72
End: 2017-05-08

## 2017-05-08 DIAGNOSIS — R06.02 SOB (SHORTNESS OF BREATH): Primary | ICD-10-CM

## 2017-05-17 ENCOUNTER — DOCUMENTATION ONLY (OUTPATIENT)
Dept: CARDIOLOGY CLINIC | Age: 72
End: 2017-05-17

## 2017-08-09 ENCOUNTER — OFFICE VISIT (OUTPATIENT)
Dept: NEUROLOGY | Age: 72
End: 2017-08-09

## 2017-08-09 ENCOUNTER — TELEPHONE (OUTPATIENT)
Dept: NEUROLOGY | Age: 72
End: 2017-08-09

## 2017-08-09 VITALS
DIASTOLIC BLOOD PRESSURE: 84 MMHG | WEIGHT: 170 LBS | TEMPERATURE: 97.5 F | RESPIRATION RATE: 18 BRPM | OXYGEN SATURATION: 98 % | HEART RATE: 69 BPM | BODY MASS INDEX: 29.02 KG/M2 | SYSTOLIC BLOOD PRESSURE: 122 MMHG | HEIGHT: 64 IN

## 2017-08-09 DIAGNOSIS — R55 SYNCOPE, UNSPECIFIED SYNCOPE TYPE: ICD-10-CM

## 2017-08-09 DIAGNOSIS — I65.23 BILATERAL CAROTID ARTERY STENOSIS: Primary | ICD-10-CM

## 2017-08-09 DIAGNOSIS — R55 SYNCOPE, UNSPECIFIED SYNCOPE TYPE: Primary | ICD-10-CM

## 2017-08-09 NOTE — PROGRESS NOTES
[address] [place] [address] [address]   [place] [address]   [place], [facility] [address]   [phone] [id number]   [phone] Fax               Chief Complaint   Patient presents with    Dizziness     hospital follow up     Current Outpatient Prescriptions   Medication Sig Dispense Refill    aspirin 81 mg chewable tablet Take 1 Tab by mouth daily. 30 Tab 0    pravastatin (PRAVACHOL) 40 mg tablet Take 1 Tab by mouth nightly. 30 Tab 0    glucosamine-chondroitin (ARTHX) 500-400 mg cap Take 2 Caps by mouth daily. No Known Allergies  Social History   Substance Use Topics    Smoking status: Never Smoker    Smokeless tobacco: Never Used    Alcohol use No     Mrs. De Los Santos returns today for follow-up after hospitalization for syncope. She also has carotid stenosis. She has not had any further episodes of syncope. She had a Doppler this morning here in the office and I reviewed that. There has been no significant change. She has about 50% stenosis bilaterally. No symptom of cerebrovascular ischemia. No new symptom. Examination  Visit Vitals    /84    Pulse 69    Temp 97.5 °F (36.4 °C)    Resp 18    Ht 5' 4\" (1.626 m)    Wt 77.1 kg (170 lb)    SpO2 98%    BMI 29.18 kg/m2     She is a very nice lady who is awake alert oriented and conversant. She has normal speech-language cognition and attention. Heart is regular. No edema. No icterus. Intact cranial nerves II-XII. No nystagmus. No pronation or drift. She resists fully in the upper and lower extremities in all muscle groups to direct testing. Reflexes are symmetrical.  No ataxia. Her gait is steady. Impression/Plan  Syncope without recurrence  Carotid stenosis which is stable  Continue to modify modifiable risk factors and we discussed those including dietary measures, dyslipidemia etc.  She will follow in 1 year and get a Doppler the morning of her follow-up appointment.   Certainly she will call if she has problems in the interim. This note was created using voice recognition software. Despite editing, there may be syntax errors. This note will not be viewable in [address]

## 2017-08-09 NOTE — PATIENT INSTRUCTIONS
Information Regarding Testing     If you have physican order for a test or a medication denied by your insurance company, this does not mean the test or medication is not appropriate for you as that is a medical decision, not a decision to be made by an insurance company representative or by an [facility] physician who has not interviewed and examined you. This is a decision to be made between you and your physician. The denial of services is a contractual matter between you and your insurance company, not an issue between your physician and the insurance company. If your test or medication is denied, you can take the following steps to help resolve the issue:    1. File a complaint with the [facility] regarding your insurance company's denial of services ordered for you. You can do this either by calling them directly or by completing an on-line complaint form on the "[employer]. This can be found at www.Affordable Renovations    2. Also file a formal complaint with your insurance company and ask to have the name of the person denying the service so that you may explore a legal option should you be harmed by this denial of service. Again, the fact the insurance company will not pay for the service does not mean it is not medically necessary and I would encourage you to follow through with the plan that was made with your physician    3. File a written complaint with your employer so your employer and benefit manager is aware of the poor coverage they are providing their employees. If you have medicare/medicaid, complain to your representative in the House and to your Cody Ponce.     [facility]   Statement to Patients  April 1, 2014      In an effort to ensure the large volume of patient prescription refills is processed in the most efficient and expeditious manner, we are asking our patients to assist us by calling your Pharmacy for all prescription refills, this will include also your  Mail Order Pharmacy. The pharmacy will contact our office electronically to continue the refill process. Please do not wait until the last minute to call your pharmacy. We need at least 48 hours (2days) to fill prescriptions. We also encourage you to call your pharmacy before going to  your prescription to make sure it is ready. With regard to controlled substance prescription refill requests (narcotic refills) that need to be picked up at our office, we ask your cooperation by providing us with at least 72 hours (3days) notice that you will need a refill. We will not refill narcotic prescription refill requests after 4:00pm on any weekday, Monday through Thursday, or after 2:00pm on Fridays, or on the weekends. We encourage everyone to explore another way of getting your prescription refill request processed using [employer], our patient web portal through our electronic medical record system. [employer] is an efficient and effective way to communicate your medication request directly to the office and  downloadable as an briana on your smart phone . [employer] also features a review functionality that allows you to view your medication list as well as leave messages for your physician. Are you ready to get connected? If so please review the attatched instructions or speak to any of our staff to get you set up right away! Thank you so much for your cooperation. Should you have any questions please contact our Practice Administrator. The Physicians and Staff,  [facility]     If we have ordered testing for you, we do not call patients with results and we do not give test results over the phone. We schedule follow up appointments so that your results can be discussed in person and any questions you have regarding them may be addressed.   If something of concern is revealed on your test, we will call you for a sooner follow up appointment. Additionally, results may be found by using the My Chart feature and one of our patient service representatives at the  can give you instructions on how to access this feature of our electronic medical record system. Learning About Living Suzie  What is a living will? A living will is a legal form you use to write down the kind of care you want at the end of your life. It is used by the health professionals who will treat you if you aren't able to decide for yourself. If you put your wishes in writing, your loved ones and others will know what kind of care you want. They won't need to guess. This can ease your mind and be helpful to others. A living will is not the same as an estate or property will. An estate will explains what you want to happen with your money and property after you die. Is a living will a legal document? A living will is a legal document. Each state has its own laws about living bourgeois. If you move to another state, make sure that your living will is legal in the state where you now live. Or you might use a universal form that has been approved by many states. This kind of form can sometimes be completed and stored online. Your electronic copy will then be available wherever you have a connection to the Internet. In most cases, doctors will respect your wishes even if you have a form from a different state. · You don't need an  to complete a living will. But legal advice can be helpful if your state's laws are unclear, your health history is complicated, or your family can't agree on what should be in your living will. · You can change your living will at any time. Some people find that their wishes about end-of-life care change as their health changes. · In addition to making a living will, think about completing a medical power of  form.  This form lets you name the person you want to make end-of-life treatment decisions for you (your \"health care agent\") if you're not able to. Many hospitals and nursing homes will give you the forms you need to complete a living will and a medical power of . · Your living will is used only if you can't make or communicate decisions for yourself anymore. If you become able to make decisions again, you can accept or refuse any treatment, no matter what you wrote in your living will. · Your state may offer an online registry. This is a place where you can store your living will online so the doctors and nurses who need to treat you can find it right away. What should you think about when creating a living will? Talk about your end-of-life wishes with your family members and your doctor. Let them know what you want. That way the people making decisions for you won't be surprised by your choices. Think about these questions as you make your living will:  · Do you know enough about life support methods that might be used? If not, talk to your doctor so you know what might be done if you can't breathe on your own, your heart stops, or you're unable to swallow. · What things would you still want to be able to do after you receive life-support methods? Would you want to be able to walk? To speak? To eat on your own? To live without the help of machines? · If you have a choice, where do you want to be cared for? In your home? At a hospital or nursing home? · Do you want certain Denominational practices performed if you become very ill? · If you have a choice at the end of your life, where would you prefer to die? At home? In a hospital or nursing home? Somewhere else? · Would you prefer to be buried or cremated? · Do you want your organs to be donated after you die? What should you do with your living will? · Make sure that your family members and your health care agent have copies of your living will. · Give your doctor a copy of your living will to keep in your medical record.  If you have more than one doctor, make sure that each one has a copy. · You may want to put a copy of your living will where it can be easily found. Where can you learn more? Go to http://obi-shay.info/. Enter V664 in the search box to learn more about \"Learning About Living Perroy. \"  Current as of: August 8, 2016  Content Version: 11.3  © 8615-9819 [employer]. Care instructions adapted under license by [employer] (which disclaims liability or warranty for this information). If you have questions about a medical condition or this instruction, always ask your healthcare professional. [address] any warranty or liability for your use of this information.

## 2017-08-09 NOTE — MR AVS SNAPSHOT
Visit Information Date & Time Provider Department Dept. Phone Encounter #  
 8/9/2017  9:40 AM Edgar Degroot MD [facility] [phone] [id number] Follow-up Instructions Return in about 1 year (around 8/9/2018) for doppler day of appointment. Your Appointments 10/6/2017 10:20 AM  
ESTABLISHED PATIENT with Kadi Maynard MD  
[facility] ([facility]AN SCHEDULING) Appt Note: 6 month follow up  
 [address] [address]  
[phone]  
  
   
 [address] [address] Upcoming Health Maintenance Date Due DTaP/Tdap/Td series (1 - Tdap) 11/9/1965 FOBT Q 1 YEAR AGE 50-75 11/9/1994 ZOSTER VACCINE AGE 60> 9/9/2004 GLAUCOMA SCREENING Q2Y 11/9/2009 OSTEOPOROSIS SCREENING (DEXA) 11/9/2009 Pneumococcal 65+ Low/Medium Risk (1 of 2 - PCV13) 11/9/2009 MEDICARE YEARLY EXAM 11/9/2009 INFLUENZA AGE 9 TO ADULT 8/1/2017 BREAST CANCER SCRN MAMMOGRAM 10/17/2018 Allergies as of 8/9/2017  Review Complete On: 8/9/2017 By: Lisa Smith LPN No Known Allergies Current Immunizations  Never Reviewed No immunizations on file. Not reviewed this visit Vitals BP Pulse Temp Resp Height(growth percentile) Weight(growth percentile) 122/84 69 97.5 °F (36.4 °C) 18 5' 4\" (1.626 m) 170 lb (77.1 kg) SpO2 BMI OB Status Smoking Status 98% 29.18 kg/m2 Menopause Never Smoker Vitals History BMI and BSA Data Body Mass Index Body Surface Area  
 29.18 kg/m 2 1.87 m 2 Preferred Pharmacy Pharmacy Name Phone [facility] [address] [place] [address], [address] [place] [address] [phone] Your Updated Medication List  
  
   
This list is accurate as of: 8/9/17  9:46 AM.  Always use your most recent med list.  
  
  
  
  
 aspirin 81 mg chewable tablet Take 1 Tab by mouth daily. glucosamine-chondroitin 500-400 mg Cap Commonly known as:  20 [facility] Take 2 Caps by mouth daily. pravastatin 40 mg tablet Commonly known as:  PRAVACHOL Take 1 Tab by mouth nightly. Follow-up Instructions Return in about 1 year (around 8/9/2018) for doppler day of appointment. To-Do List   
 10/20/2017 9:00 AM  
  Appointment with [facility] NAVEEN 2 at [facility] ([phone]) Shower or bathe using soap and water. Do not use deodorant, powder, perfumes, or lotion the day of your exam.  If your prior mammograms were not performed at [place] [address] please bring films with you or forward prior images 2 days before your procedure. Check in at registration 15min before your appointment time unless you were instructed to do otherwise. A script is not necessary for screening. If you have one, please bring it on the day of the mammogram or have it faxed to the department. [facility]  [phone] [facility] [phone] [facility] [phone] [facility] [phone] Patient Instructions Information Regarding Testing If you have physican order for a test or a medication denied by your insurance company, this does not mean the test or medication is not appropriate for you as that is a medical decision, not a decision to be made by an insurance company representative or by an [facility] physician who has not interviewed and examined you. This is a decision to be made between you and your physician. The denial of services is a contractual matter between you and your insurance company, not an issue between your physician and the insurance company. If your test or medication is denied, you can take the following steps to help resolve the issue: 1. File a complaint with the [facility] of Insurance regarding your insurance company's denial of services ordered for you.   You can do this either by calling them directly or by completing an on-line complaint form on the [employer]. This can be found at www.[place].gov 2. Also file a formal complaint with your insurance company and ask to have the name of the person denying the service so that you may explore a legal option should you be harmed by this denial of service. Again, the fact the insurance company will not pay for the service does not mean it is not medically necessary and I would encourage you to follow through with the plan that was made with your physician 3. File a written complaint with your employer so your employer and benefit manager is aware of the poor coverage they are providing their employees. If you have medicare/medicaid, complain to your representative in the House and to your Cody Ponce. PRESCRIPTION REFILL POLICY [facility] Statement to Patients April 1, 2014 In an effort to ensure the large volume of patient prescription refills is processed in the most efficient and expeditious manner, we are asking our patients to assist us by calling your Pharmacy for all prescription refills, this will include also your  Mail Order Pharmacy. The pharmacy will contact our office electronically to continue the refill process. Please do not wait until the last minute to call your pharmacy. We need at least 48 hours (2days) to fill prescriptions. We also encourage you to call your pharmacy before going to  your prescription to make sure it is ready. With regard to controlled substance prescription refill requests (narcotic refills) that need to be picked up at our office, we ask your cooperation by providing us with at least 72 hours (3days) notice that you will need a refill. We will not refill narcotic prescription refill requests after 4:00pm on any weekday, Monday through Thursday, or after 2:00pm on Fridays, or on the weekends.   
  
We encourage everyone to explore another way of getting your prescription refill request processed using [employer], our patient web portal through our electronic medical record system. [employer] is an efficient and effective way to communicate your medication request directly to the office and  downloadable as an briana on your smart phone . [employer] also features a review functionality that allows you to view your medication list as well as leave messages for your physician. Are you ready to get connected? If so please review the attatched instructions or speak to any of our staff to get you set up right away! Thank you so much for your cooperation. Should you have any questions please contact our Practice Administrator. The Physicians and Staff,  [facility] If we have ordered testing for you, we do not call patients with results and we do not give test results over the phone. We schedule follow up appointments so that your results can be discussed in person and any questions you have regarding them may be addressed. If something of concern is revealed on your test, we will call you for a sooner follow up appointment. Additionally, results may be found by using the My Chart feature and one of our patient service representatives at the  can give you instructions on how to access this feature of our electronic medical record system. Cindi Junior 1728 What is a living will? A living will is a legal form you use to write down the kind of care you want at the end of your life. It is used by the health professionals who will treat you if you aren't able to decide for yourself. If you put your wishes in writing, your loved ones and others will know what kind of care you want. They won't need to guess. This can ease your mind and be helpful to others. A living will is not the same as an estate or property will. An estate will explains what you want to happen with your money and property after you die. Is a living will a legal document? A living will is a legal document. Each state has its own laws about living bourgeois. If you move to another state, make sure that your living will is legal in the state where you now live. Or you might use a universal form that has been approved by many states. This kind of form can sometimes be completed and stored online. Your electronic copy will then be available wherever you have a connection to the Internet. In most cases, doctors will respect your wishes even if you have a form from a different state. · You don't need an  to complete a living will. But legal advice can be helpful if your state's laws are unclear, your health history is complicated, or your family can't agree on what should be in your living will. · You can change your living will at any time. Some people find that their wishes about end-of-life care change as their health changes. · In addition to making a living will, think about completing a medical power of  form. This form lets you name the person you want to make end-of-life treatment decisions for you (your \"health care agent\") if you're not able to. Many hospitals and nursing homes will give you the forms you need to complete a living will and a medical power of . · Your living will is used only if you can't make or communicate decisions for yourself anymore. If you become able to make decisions again, you can accept or refuse any treatment, no matter what you wrote in your living will. · Your state may offer an online registry. This is a place where you can store your living will online so the doctors and nurses who need to treat you can find it right away. What should you think about when creating a living will? Talk about your end-of-life wishes with your family members and your doctor. Let them know what you want. That way the people making decisions for you won't be surprised by your choices. Think about these questions as you make your living will: · Do you know enough about life support methods that might be used? If not, talk to your doctor so you know what might be done if you can't breathe on your own, your heart stops, or you're unable to swallow. · What things would you still want to be able to do after you receive life-support methods? Would you want to be able to walk? To speak? To eat on your own? To live without the help of machines? · If you have a choice, where do you want to be cared for? In your home? At a hospital or nursing home? · Do you want certain Confucianist practices performed if you become very ill? · If you have a choice at the end of your life, where would you prefer to die? At home? In a hospital or nursing home? Somewhere else? · Would you prefer to be buried or cremated? · Do you want your organs to be donated after you die? What should you do with your living will? · Make sure that your family members and your health care agent have copies of your living will. · Give your doctor a copy of your living will to keep in your medical record. If you have more than one doctor, make sure that each one has a copy. · You may want to put a copy of your living will where it can be easily found. Where can you learn more? Go to http://obi-shay.info/. Enter E645 in the search box to learn more about \"Learning About Living [employer]. \" Current as of: August 8, 2016 Content Version: 11.3 © 2250-0236 [employer]. Care instructions adapted under license by [employer] (which disclaims liability or warranty for this information). If you have questions about a medical condition or this instruction, always ask your healthcare professional. [address] any warranty or liability for your use of this information. Introducing [facility]! Dear Paty Campbell: Thank you for requesting a [employer] account.   Our records indicate that you already have an active [employer] account. You can access your account anytime at [email or web address]. [employer] Did you know that you can access your hospital and ER discharge instructions at any time in [employer]? You can also review all of your test results from your hospital stay or ER visit. Additional Information If you have questions, please visit the Frequently Asked Questions section of the [employer] website at [email or web address] Remember, [employer] is NOT to be used for urgent needs. For medical emergencies, dial 911. Now available from your iPhone and Android! Please provide this summary of care documentation to your next provider. Your primary care clinician is listed as [employer]. If you have any questions after today's visit, please call [phone].

## 2017-08-11 NOTE — PROCEDURES
Carotid Doppler:     Date: 08/09/17    Requesting Physician:  Wojciech Zuniga. MD Sur[place]     Indication:  Syncope and carotid stenosis. B-mode imaging reveals mixed plaque at the bifurcation bilaterally extending into the proximal internal carotid artery segment bilaterally, right more moderate, left mild degree. Doppler spectral analysis reveals no significantly elevated velocity. Vertebral artery flow antegrade bilaterally. Interpretation:  Plaque as noted. Stenosis estimated at 16-49% through the right. Stenosis through the left side 1-15%.

## 2017-09-19 ENCOUNTER — HOSPITAL ENCOUNTER (OUTPATIENT)
Dept: LAB | Age: 72
Discharge: HOME | End: 2017-09-19
Payer: MEDICARE

## 2017-09-19 PROCEDURE — 36415 COLL VENOUS BLD VENIPUNCTURE: CPT

## 2017-09-19 PROCEDURE — 80061 LIPID PANEL: CPT

## 2017-09-19 PROCEDURE — 80076 HEPATIC FUNCTION PANEL: CPT

## 2017-09-20 LAB
ALBUMIN SERPL-MCNC: 4.1 G/DL (ref 3.5–4.8)
ALP SERPL-CCNC: 55 IU/L (ref 39–117)
ALT SERPL-CCNC: 14 IU/L (ref 0–32)
AST SERPL-CCNC: 15 IU/L (ref 0–40)
BILIRUB DIRECT SERPL-MCNC: 0.12 MG/DL (ref 0–0.4)
BILIRUB SERPL-MCNC: 0.4 MG/DL (ref 0–1.2)
CHOLEST SERPL-MCNC: 132 MG/DL (ref 100–199)
HDLC SERPL-MCNC: 79 MG/DL
INTERPRETATION, 910389: NORMAL
LDLC SERPL CALC-MCNC: 44 MG/DL (ref 0–99)
PROT SERPL-MCNC: 6.1 G/DL (ref 6–8.5)
TRIGL SERPL-MCNC: 44 MG/DL (ref 0–149)
VLDLC SERPL CALC-MCNC: 9 MG/DL (ref 5–40)

## 2017-09-25 DIAGNOSIS — E78.00 HYPERCHOLESTEREMIA: Primary | ICD-10-CM

## 2017-10-06 ENCOUNTER — OFFICE VISIT (OUTPATIENT)
Dept: CARDIOLOGY CLINIC | Age: 72
End: 2017-10-06

## 2017-10-06 VITALS
OXYGEN SATURATION: 95 % | BODY MASS INDEX: 29.35 KG/M2 | DIASTOLIC BLOOD PRESSURE: 84 MMHG | SYSTOLIC BLOOD PRESSURE: 140 MMHG | WEIGHT: 171 LBS | HEART RATE: 74 BPM

## 2017-10-06 DIAGNOSIS — R06.02 SOB (SHORTNESS OF BREATH): Primary | ICD-10-CM

## 2017-10-06 DIAGNOSIS — R55 SYNCOPE, UNSPECIFIED SYNCOPE TYPE: ICD-10-CM

## 2017-10-06 DIAGNOSIS — E78.5 DYSLIPIDEMIA (HIGH LDL; LOW HDL): ICD-10-CM

## 2017-10-06 NOTE — PROGRESS NOTES
LAST OFFICE VISIT : 5/8/2017      No diagnosis found. Rakesh Sol is a 66 y.o. female with dyslipidemia referred for 6 month f/u.      Cardiac risk factors: post menopausal, dyslipidemia, family history. I have personally obtained the history from the patient. HISTORY OF PRESENTING ILLNESS      Overall, pt states she is doing well. She has been trying to exercise, but experiences hip pain whenever she tries to walk on a treadmill. She sees a chiropractor for this hip pain, and she had a prior h/o polio. 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 This is followed by Dr. Humera Aguilar    The patient denies chest pain/ shortness of breath, orthopnea, PND, LE edema, palpitations, syncope, presyncope or fatigue.        ACTIVE PROBLEM LIST     Patient Active Problem List    Diagnosis Date Noted    Urinary incontinence 02/18/2017    Syncope 02/17/2017           PAST MEDICAL HISTORY     Past Medical History:   Diagnosis Date    Anxiety     Arthritis     Fatigue     Leg swelling     Polio     SOB (shortness of breath)            PAST SURGICAL HISTORY     Past Surgical History:   Procedure Laterality Date    BREAST SURGERY PROCEDURE UNLISTED  1975    HX ORTHOPAEDIC  1    NAVEEN BIOPSY BREAST STEREOTACTIC Right 1980    negative          ALLERGIES     No Known Allergies       FAMILY HISTORY     Family History   Problem Relation Age of Onset    Dementia Mother     Heart Disease Father     negative for cardiac disease       SOCIAL HISTORY     Social History     Social History    Marital status:      Spouse name: N/A    Number of children: N/A    Years of education: N/A     Social History Main Topics    Smoking status: Never Smoker    Smokeless tobacco: Never Used    Alcohol use No    Drug use: No    Sexual activity: Not on file     Other Topics Concern    Not on file     Social History Narrative         MEDICATIONS     Current Outpatient Prescriptions   Medication Sig    aspirin 81 mg chewable tablet Take 1 Tab by mouth daily.  pravastatin (PRAVACHOL) 40 mg tablet Take 1 Tab by mouth nightly.  glucosamine-chondroitin (ARTHX) 500-400 mg cap Take 2 Caps by mouth daily. No current facility-administered medications for this visit. I have reviewed the nurses notes, vitals, problem list, allergy list, medical history, family, social history and medications. REVIEW OF SYMPTOMS      General: Pt denies excessive weight gain or loss. Pt is able to conduct ADL's  HEENT: Denies blurred vision, headaches, hearing loss, epistaxis and difficulty swallowing. Respiratory: Denies cough, congestion, shortness of breath, INFANTE, wheezing or stridor. Cardiovascular: Denies precordial pain, palpitations, edema or PND  Gastrointestinal: Denies poor appetite, indigestion, abdominal pain or blood in stool  Genitourinary: Denies hematuria, dysuria, increased urinary frequency  Musculoskeletal: Denies joint pain or swelling from muscles or joints  Neurologic: Denies tremor, paresthesias, headache, or sensory motor disturbance  Psychiatric: Denies confusion, insomnia, depression  Integumentray: Denies rash, itching or ulcers. Hematologic: Denies easy bruising, bleeding     PHYSICAL EXAMINATION      There were no vitals filed for this visit. General: Well developed, in no acute distress. HEENT: No jaundice, oral mucosa moist, no oral ulcers  Neck: Supple, no stiffness, no lymphadenopathy, supple  Heart:  Normal S1/S2 negative S3 or S4. Regular, no murmur, gallop or rub, no jugular venous distention  Respiratory: Clear bilaterally x 4, no wheezing or rales   Extremities:  No edema, normal cap refill, no cyanosis. Musculoskeletal: No clubbing, no deformities  Neuro: A&Ox3, speech clear, gait stable, cooperative, no focal neurologic deficits  Skin: Skin color is normal. No rashes or lesions. Non diaphoretic, moist.       DIAGNOSTIC DATA     1. Echo  2/17/17- EF 60%    2.  Lipids  2/19/17- , HDL 79, .8, TG 86  9/19/17- , HDL 79, LDL 44, TG 44    3. Carotid Doppler  2/19/17- R 50-69%, L 0-49%    4. Treadmill  5/8/17- no ischemia    5. Loop  2/23/17 - 3/24/17- NSR         LABORATORY DATA            Lab Results   Component Value Date/Time    WBC 6.8 02/18/2017 05:28 AM    HGB 13.7 02/18/2017 05:28 AM    HCT 40.6 02/18/2017 05:28 AM    PLATELET 416 16/75/0212 05:28 AM    MCV 92.5 02/18/2017 05:28 AM      Lab Results   Component Value Date/Time    Sodium 144 02/18/2017 05:28 AM    Potassium 3.9 02/18/2017 05:28 AM    Chloride 107 02/18/2017 05:28 AM    CO2 29 02/18/2017 05:28 AM    Anion gap 8 02/18/2017 05:28 AM    Glucose 95 02/18/2017 05:28 AM    BUN 11 02/18/2017 05:28 AM    Creatinine 0.68 02/18/2017 05:28 AM    BUN/Creatinine ratio 16 02/18/2017 05:28 AM    GFR est AA >60 02/18/2017 05:28 AM    GFR est non-AA >60 02/18/2017 05:28 AM    Calcium 8.1 02/18/2017 05:28 AM    Bilirubin, total 0.4 09/19/2017 10:15 AM    AST (SGOT) 15 09/19/2017 10:15 AM    Alk. phosphatase 55 09/19/2017 10:15 AM    Protein, total 6.1 09/19/2017 10:15 AM    Albumin 4.1 09/19/2017 10:15 AM    Globulin 2.9 02/18/2017 05:28 AM    A-G Ratio 1.2 02/18/2017 05:28 AM    ALT (SGPT) 14 09/19/2017 10:15 AM           ASSESSMENT/RECOMMENDATIONS:.      1. Syncope  -She hasn't experienced any further episodes. 2. Dyslipidemia  - Her last lipid panel was normal while on pravastatin. I advised her to continue her current dose, increase exercise, and eat a low fat diet. 3. SOB  -No further episodes  4. Return in 6 months or PRN    No orders of the defined types were placed in this encounter. Follow-up Disposition: Not on File      I have discussed the diagnosis with  Alonso Hill and the intended plan as seen in the above orders. Questions were answered concerning future plans. I have discussed medication side effects and warnings with the patient as well.     Thank you,  Ruchi Castaneda MD for involving me in the care of  [facility]. Please do not hesitate to contact me for further questions/concerns. Scribed by Rosemarie Martínez, under supervision by Dr. Aileen Poole. MD [place], [address]      [facility], [address] [place] [address] [address]      [phone] / [phone] Fax

## 2017-10-06 NOTE — PROGRESS NOTES
Pt has no complaints/no cardiac concerns    Visit Vitals    /84 (BP 1 Location: Left arm, BP Patient Position: Sitting)    Pulse 74    Wt 171 lb (77.6 kg)    SpO2 95%    BMI 29.35 kg/m2

## 2017-10-20 ENCOUNTER — HOSPITAL ENCOUNTER (OUTPATIENT)
Dept: MAMMOGRAPHY | Age: 72
Discharge: HOME | End: 2017-10-20
Attending: FAMILY MEDICINE
Payer: MEDICARE

## 2017-10-20 DIAGNOSIS — Z12.31 VISIT FOR SCREENING MAMMOGRAM: ICD-10-CM

## 2017-10-20 PROCEDURE — 77063 BREAST TOMOSYNTHESIS BI: CPT

## 2018-04-24 ENCOUNTER — OFFICE VISIT (OUTPATIENT)
Dept: SURGERY | Age: 73
End: 2018-04-24

## 2018-04-24 VITALS
WEIGHT: 173 LBS | SYSTOLIC BLOOD PRESSURE: 134 MMHG | BODY MASS INDEX: 29.53 KG/M2 | DIASTOLIC BLOOD PRESSURE: 82 MMHG | HEIGHT: 64 IN | HEART RATE: 84 BPM

## 2018-04-24 DIAGNOSIS — I80.8 THROMBOPHLEBITIS OF BREAST (MONDOR'S DISEASE): Primary | ICD-10-CM

## 2018-04-24 NOTE — PATIENT INSTRUCTIONS
Breast Self-Exam: Care Instructions  Your Care Instructions    A breast self-exam is when you check your breasts for lumps or changes. This regular exam helps you learn how your breasts normally look and feel. Most breast problems or changes are not because of cancer. Breast self-exam is not a substitute for a mammogram. Having regular breast exams by your doctor and regular mammograms improve your chances of finding any problems with your breasts. Some women set a time each month to do a step-by-step breast self-exam. Other women like a less formal system. They might look at their breasts as they brush their teeth, or feel their breasts once in a while in the shower. If you notice a change in your breast, tell your doctor. Follow-up care is a key part of your treatment and safety. Be sure to make and go to all appointments, and call your doctor if you are having problems. It's also a good idea to know your test results and keep a list of the medicines you take. How do you do a breast self-exam?  · The best time to examine your breasts is usually one week after your menstrual period begins. Your breasts should not be tender then. If you do not have periods, you might do your exam on a day of the month that is easy to remember. · To examine your breasts:  ¨ Remove all your clothes above the waist and lie down. When you are lying down, your breast tissue spreads evenly over your chest wall, which makes it easier to feel all your breast tissue. ¨ Use the pads-not the fingertips-of the 3 middle fingers of your left hand to check your right breast. Move your fingers slowly in small coin-sized circles that overlap. ¨ Use three levels of pressure to feel of all your breast tissue. Use light pressure to feel the tissue close to the skin surface. Use medium pressure to feel a little deeper. Use firm pressure to feel your tissue close to your breastbone and ribs.  Use each pressure level to feel your breast tissue before moving on to the next spot. ¨ Check your entire breast, moving up and down as if following a strip from the collarbone to the bra line, and from the armpit to the ribs. Repeat until you have covered the entire breast.  ¨ Repeat this procedure for your left breast, using the pads of the 3 middle fingers of your right hand. · To examine your breasts while in the shower:  ¨ Place one arm over your head and lightly soap your breast on that side. ¨ Using the pads of your fingers, gently move your hand over your breast (in the strip pattern described above), feeling carefully for any lumps or changes. ¨ Repeat for the other breast.  · Have your doctor inspect anything you notice to see if you need further testing. Where can you learn more? Go to http://obi-shay.info/. Enter P148 in the search box to learn more about \"Breast Self-Exam: Care Instructions. \"  Current as of: May 12, 2017  Content Version: 11.4  © 4437-6691 [employer]wise, Inc[employer]. Care instructions adapted under license by [employer] (which disclaims liability or warranty for this information). If you have questions about a medical condition or this instruction, always ask your healthcare professional. [place] [address] any warranty or liability for your use of this information.

## 2018-04-24 NOTE — PROGRESS NOTES
HISTORY OF PRESENT ILLNESS  Crys Escobedo is a 67 y.o. female. HPI NEW patient referral for consultation by Dr. Jordan Keller for LEFT breast pain. This started about 2 weeks ago. She describes the discomfort as a dull constant ache. The RIGHT nipple is chronically inverted. No nipple discharge or palpable breasts lump. RIGHT breast biopsy in the  was benign. Obstetric History     No data available      Obstetric Comments   Menarche: 5. LMP: age 47. # of Children:  1. Age at Delivery of First Child:  31   Hysterectomy/oophorectomy: no/no  Breast Bx: yes RIGHT  Hx of Breast Feeding:  no. BCP:  Yes short period. Hormone therapy:  none. Family history. Mother had (?)ovarian cancer age 80 and survived.  age 80 from other causes. Mammogram - 3D 10/2017 BI RADS 1     Review of Systems   Constitutional: Negative. HENT: Negative. Eyes: Negative. Respiratory: Negative. Cardiovascular: Positive for palpitations. Gastrointestinal: Negative. Genitourinary: Negative. Musculoskeletal: Positive for joint pain. Skin: Negative. Neurological: Negative. Endo/Heme/Allergies: Negative. Psychiatric/Behavioral: Negative. Physical Exam   Pulmonary/Chest: Right breast exhibits inverted nipple. Right breast exhibits no mass, no nipple discharge, no skin change and no tenderness. Left breast exhibits tenderness (behind the nipple). Left breast exhibits no mass, no nipple discharge and no skin change. Breasts are symmetrical.   Lymphadenopathy:     She has no cervical adenopathy. She has no axillary adenopathy. Right: No supraclavicular adenopathy present. Left: No supraclavicular adenopathy present. BREAST ULTRASOUND  Indication: Left  breast pain behind the nipple  Technique:   The area was scanned using a high-frequency linear-array near-field transducer  Findings: 2 mm thrombosed vein 1:00 subareolar   Impression: superficial thrombophlebitis   Disposition: will resolve spontanously  ASSESSMENT and PLAN    ICD-10-CM ICD-9-CM    1. 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 This pain is typically self-limiting and should resolve in about a month. Heat may speed up the process. She is on daily aspirin for cardiac reasons. 3d Mammogram due 10/2018.

## 2018-04-25 NOTE — COMMUNICATION BODY
HISTORY OF PRESENT ILLNESS  Jeffrey Leahy is a 67 y.o. female. HPI NEW patient referral for consultation by Dr. Troy Kenny for LEFT breast pain. This started about 2 weeks ago. She describes the discomfort as a dull constant ache. The RIGHT nipple is chronically inverted. No nipple discharge or palpable breasts lump. RIGHT breast biopsy in the  was benign. Obstetric History     No data available      Obstetric Comments   Menarche: 5. LMP: age 47. # of Children:  1. Age at Delivery of First Child:  31   Hysterectomy/oophorectomy: no/no  Breast Bx: yes RIGHT  Hx of Breast Feeding:  no. BCP:  Yes short period. Hormone therapy:  none. Family history. Mother had (?)ovarian cancer age 80 and survived.  age 80 from other causes. Mammogram - 3D 10/2017 BI RADS 1     Review of Systems   Constitutional: Negative. HENT: Negative. Eyes: Negative. Respiratory: Negative. Cardiovascular: Positive for palpitations. Gastrointestinal: Negative. Genitourinary: Negative. Musculoskeletal: Positive for joint pain. Skin: Negative. Neurological: Negative. Endo/Heme/Allergies: Negative. Psychiatric/Behavioral: Negative. Physical Exam   Pulmonary/Chest: Right breast exhibits inverted nipple. Right breast exhibits no mass, no nipple discharge, no skin change and no tenderness. Left breast exhibits tenderness (behind the nipple). Left breast exhibits no mass, no nipple discharge and no skin change. Breasts are symmetrical.   Lymphadenopathy:     She has no cervical adenopathy. She has no axillary adenopathy. Right: No supraclavicular adenopathy present. Left: No supraclavicular adenopathy present. BREAST ULTRASOUND  Indication: Left  breast pain behind the nipple  Technique:   The area was scanned using a high-frequency linear-array near-field transducer  Findings: 2 mm thrombosed vein 1:00 subareolar   Impression: superficial thrombophlebitis   Disposition: will resolve spontanously  ASSESSMENT and PLAN    ICD-10-CM ICD-9-CM    1. 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 This pain is typically self-limiting and should resolve in about a month. Heat may speed up the process. She is on daily aspirin for cardiac reasons. 3d Mammogram due 10/2018.

## 2018-05-03 LAB
ALBUMIN SERPL-MCNC: 3.9 G/DL (ref 3.5–4.8)
ALP SERPL-CCNC: 57 IU/L (ref 39–117)
ALT SERPL-CCNC: 19 IU/L (ref 0–32)
AST SERPL-CCNC: 19 IU/L (ref 0–40)
BILIRUB DIRECT SERPL-MCNC: 0.12 MG/DL (ref 0–0.4)
BILIRUB SERPL-MCNC: 0.5 MG/DL (ref 0–1.2)
CHOLEST SERPL-MCNC: 143 MG/DL (ref 100–199)
HDLC SERPL-MCNC: 67 MG/DL
INTERPRETATION, 910389: NORMAL
LDLC SERPL CALC-MCNC: 60 MG/DL (ref 0–99)
PROT SERPL-MCNC: 6 G/DL (ref 6–8.5)
TRIGL SERPL-MCNC: 82 MG/DL (ref 0–149)
VLDLC SERPL CALC-MCNC: 16 MG/DL (ref 5–40)

## 2018-05-11 ENCOUNTER — OFFICE VISIT (OUTPATIENT)
Dept: CARDIOLOGY CLINIC | Age: 73
End: 2018-05-11

## 2018-05-11 VITALS
HEIGHT: 64 IN | WEIGHT: 170.6 LBS | SYSTOLIC BLOOD PRESSURE: 138 MMHG | HEART RATE: 72 BPM | DIASTOLIC BLOOD PRESSURE: 78 MMHG | BODY MASS INDEX: 29.12 KG/M2

## 2018-05-11 DIAGNOSIS — E78.5 DYSLIPIDEMIA (HIGH LDL; LOW HDL): ICD-10-CM

## 2018-05-11 DIAGNOSIS — R06.02 SOB (SHORTNESS OF BREATH): Primary | ICD-10-CM

## 2018-05-11 DIAGNOSIS — R55 SYNCOPE, UNSPECIFIED SYNCOPE TYPE: ICD-10-CM

## 2018-05-11 NOTE — PROGRESS NOTES
Visit Vitals    /78    Pulse 72    Ht 5' 4\" (1.626 m)    Wt 170 lb 9.6 oz (77.4 kg)    BMI 29.28 kg/m2

## 2018-05-11 NOTE — MR AVS SNAPSHOT
[address] [address] 
[phone] Patient: Dane Piedra MRN: [id number] :1945 Visit Information Date & Time Provider Department Dept. Phone Encounter #  
 2018 10:20 AM Frankey Ide, MD [facility] [place] [phone] [id number] Follow-up Instructions Return in about 6 months (around 2018). Your Appointments 2018  8:40 AM  
Follow Up with Suyapa Leonard MD  
[facility]) Appt Note: 1 year f/u [facility]/  
 [address] [address] [phone]  
  
   
 [facility]  
  
    
 2018  9:00 AM  
ULTRASOUND with DOPPLER  [facility] ([address]) Appt Note: 1 year f/u doppler [facility] [place] [address] [facility] [address] [address] [phone]  
  
   
 [place] [address] [address] [address] Upcoming Health Maintenance Date Due DTaP/Tdap/Td series (1 - Tdap) 1965 FOBT Q 1 YEAR AGE 50-75 1994 ZOSTER VACCINE AGE 60> 2004 GLAUCOMA SCREENING Q2Y 2009 Bone Densitometry (Dexa) Screening 2009 Pneumococcal 65+ Low/Medium Risk (1 of 2 - PCV13) 2009 MEDICARE YEARLY EXAM 3/14/2018 Influenza Age 5 to Adult 2018 BREAST CANCER SCRN MAMMOGRAM 10/20/2019 Allergies as of 2018  Review Complete On: 2018 By: Frankey Ide, MD  
 No Known Allergies Current Immunizations  Never Reviewed No immunizations on file. Not reviewed this visit You Were Diagnosed With   
  
 Codes Comments SOB (shortness of breath)    -  Primary ICD-10-CM: R06.02 
ICD-9-CM: 786.05 Dyslipidemia (high LDL; low HDL)     ICD-10-CM: E78.5 ICD-9-CM: 272.4 Syncope, unspecified syncope type     ICD-10-CM: R55 
ICD-9-CM: 780. 2 Vitals BP Pulse Height(growth percentile) Weight(growth percentile) BMI OB Status 138/78 72 5' 4\" (1.626 m) 170 lb 9.6 oz (77.4 kg) 29.28 kg/m2 Menopause Smoking Status Never Smoker Vitals History BMI and BSA Data Body Mass Index Body Surface Area  
 29.28 kg/m 2 1.87 m 2 Preferred Pharmacy Pharmacy Name Phone [facility] [address] [place] [address], [address] [place] [address] [address] [phone] Your Updated Medication List  
  
   
This list is accurate as of 5/11/18 10:43 AM.  Always use your most recent med list.  
  
  
  
  
 aspirin 81 mg chewable tablet Take 1 Tab by mouth daily. glucosamine-chondroitin 500-400 mg Cap Commonly known as:  [address] Take 2 Caps by mouth daily. pravastatin 40 mg tablet Commonly known as:  PRAVACHOL Take 1 Tab by mouth nightly. Follow-up Instructions Return in about 6 months (around 11/11/2018). Introducing [facility]! Dear Vaughn Palumbo: Thank you for requesting a "[facility] account. Our records indicate that you already have an active "[facility] account. You can access your account anytime at [email or web address] Did you know that you can access your hospital and ER discharge instructions at any time in "[facility]? You can also review all of your test results from your hospital stay or ER visit. Additional Information If you have questions, please visit the Frequently Asked Questions section of the "[facility] website at [email or web address] Remember, "[facility] is NOT to be used for urgent needs. For medical emergencies, dial 911. Now available from your iPhone and Android! Please provide this summary of care documentation to your next provider. Your primary care clinician is listed as Raleigh Cain.  If you have any questions after today's visit, please call [phone].

## 2018-05-11 NOTE — PROGRESS NOTES
LAST OFFICE VISIT : 10/6/2017      No diagnosis found. Jesús Oakes is a 67 y.o. female with dyslipidemia referred for 6 month follow up. Cardiac risk factors: family history, dyslipidemia, post-menopausal  I have personally obtained the history from the patient. HISTORY OF PRESENTING ILLNESS     Overall the pt states she is doing well. The pt denies any lightheadedness of dizziness. She knows to stay hydrated while she is out in the heat. She has an appointment with neurology in August. The pt states that she stays very busy and is very active. The patient denies chest pain/ shortness of breath, orthopnea, PND, LE edema, palpitations, syncope, presyncope or fatigue.          ACTIVE PROBLEM LIST     Patient Active Problem List    Diagnosis Date Noted    Urinary incontinence 02/18/2017    Syncope 02/17/2017           PAST MEDICAL HISTORY     Past Medical History:   Diagnosis Date    Anxiety     Arthritis     Fatigue     Leg swelling     Polio     SOB (shortness of breath)            PAST SURGICAL HISTORY     Past Surgical History:   Procedure Laterality Date    BREAST SURGERY PROCEDURE UNLISTED  1975    HX ORTHOPAEDIC  1    NAVEEN BIOPSY BREAST STEREOTACTIC Right 1980    negative          ALLERGIES     No Known Allergies       FAMILY HISTORY     Family History   Problem Relation Age of Onset    Dementia Mother    Anastasia James Cancer Mother 80     ovarian    Heart Disease Father     negative for cardiac disease       SOCIAL HISTORY     Social History     Social History    Marital status:      Spouse name: N/A    Number of children: N/A    Years of education: N/A     Social History Main Topics    Smoking status: Never Smoker    Smokeless tobacco: Never Used    Alcohol use No    Drug use: No    Sexual activity: Not Asked     Other Topics Concern    None     Social History Narrative         MEDICATIONS     Current Outpatient Prescriptions   Medication Sig    aspirin 81 mg chewable tablet Take 1 Tab by mouth daily.  pravastatin (PRAVACHOL) 40 mg tablet Take 1 Tab by mouth nightly.  glucosamine-chondroitin (ARTHX) 500-400 mg cap Take 2 Caps by mouth daily. No current facility-administered medications for this visit. I have reviewed the nurses notes, vitals, problem list, allergy list, medical history, family, social history and medications. REVIEW OF SYMPTOMS      General: Pt denies excessive weight gain or loss. Pt is able to conduct ADL's  HEENT: Denies blurred vision, headaches, hearing loss, epistaxis and difficulty swallowing. Respiratory: Denies cough, congestion, shortness of breath, INFANTE, wheezing or stridor. Cardiovascular: Denies precordial pain, palpitations, edema or PND  Gastrointestinal: Denies poor appetite, indigestion, abdominal pain or blood in stool  Genitourinary: Denies hematuria, dysuria, increased urinary frequency  Musculoskeletal: Denies joint pain or swelling from muscles or joints  Neurologic: Denies tremor, paresthesias, headache, or sensory motor disturbance  Psychiatric: Denies confusion, insomnia, depression  Integumentray: Denies rash, itching or ulcers. Hematologic: Denies easy bruising, bleeding     PHYSICAL EXAMINATION      Vitals:    05/11/18 1016   BP: 138/78   Pulse: 72   Weight: 170 lb 9.6 oz (77.4 kg)   Height: 5' 4\" (1.626 m)     General: Well developed, in no acute distress. HEENT: No jaundice, oral mucosa moist, no oral ulcers  Neck: Supple, no stiffness, no lymphadenopathy, supple  Heart:  Normal S1/S2 negative S3 or S4. Regular, no murmur, gallop or rub, no jugular venous distention  Respiratory: Clear bilaterally x 4, no wheezing or rales  Extremities:  No edema, normal cap refill, no cyanosis. Musculoskeletal: No clubbing, no deformities  Neuro: A&Ox3, speech clear, gait stable, cooperative, no focal neurologic deficits  Skin: Skin color is normal. No rashes or lesions.  Non diaphoretic, moist.        DIAGNOSTIC DATA 1. Echo  2/17/17- EF 60%    2. Lipids  2/19/17- , HDL 79, .8, TG 86  9/19/17- , HDL 79, LDL 44, TG 44  5/2/18 - , HDL 67, LDL 60, TG 82    3. Carotid Doppler  2/19/17- R 50-69%, L 0-49%  8/9/17- R 16-49%, L 1-15%    4. Myocardial perfusion study  5/8/17- (exercise stress) no ischemia    5. Loop  2/23/17 - 3/24/17- NSR         LABORATORY DATA            Lab Results   Component Value Date/Time    WBC 6.8 02/18/2017 05:28 AM    HGB 13.7 02/18/2017 05:28 AM    HCT 40.6 02/18/2017 05:28 AM    PLATELET 810 87/07/8488 05:28 AM    MCV 92.5 02/18/2017 05:28 AM      Lab Results   Component Value Date/Time    Sodium 144 02/18/2017 05:28 AM    Potassium 3.9 02/18/2017 05:28 AM    Chloride 107 02/18/2017 05:28 AM    CO2 29 02/18/2017 05:28 AM    Anion gap 8 02/18/2017 05:28 AM    Glucose 95 02/18/2017 05:28 AM    BUN 11 02/18/2017 05:28 AM    Creatinine 0.68 02/18/2017 05:28 AM    BUN/Creatinine ratio 16 02/18/2017 05:28 AM    GFR est AA >60 02/18/2017 05:28 AM    GFR est non-AA >60 02/18/2017 05:28 AM    Calcium 8.1 (L) 02/18/2017 05:28 AM    Bilirubin, total 0.5 05/02/2018 07:50 AM    AST (SGOT) 19 05/02/2018 07:50 AM    Alk. phosphatase 57 05/02/2018 07:50 AM    Protein, total 6.0 05/02/2018 07:50 AM    Albumin 3.9 05/02/2018 07:50 AM    Globulin 2.9 02/18/2017 05:28 AM    A-G Ratio 1.2 02/18/2017 05:28 AM    ALT (SGPT) 19 05/02/2018 07:50 AM           ASSESSMENT/RECOMMENDATIONS:.      1. Syncope  - She is not having any further episodes of lightheaded or dizzy spells. She knows to stay hydrated when she is out in the heat. - She is also followed by Dr. Matilde Roth. 2. Dyslipidemia  - Lipids are at goal, I have given her a lab slip to have these rechecked. 3. Return in 1 year or PRN. No orders of the defined types were placed in this encounter. Follow-up Disposition: Not on File      I have discussed the diagnosis with  Romel Schroeder and the intended plan as seen in the above orders. Questions were answered concerning future plans. I have discussed medication side effects and warnings with the patient as well. Thank you,  Lyric Palomares MD for involving me in the care of  Wilmer. Please do not hesitate to contact me for further questions/concerns. Written by Jovana Serrano, as dictated by Janny Santos MD.     Syeda Carrasquillo MD, [facility]    Patient Care Team:  Janette Colunga MD as PCP - General (Family Practice)  Winter Agarwal MD (Breast Surgery)  Janny Santos MD (Cardiology)    [place] [id number]      [address]      [phone] / [phone] Fax

## 2018-07-18 ENCOUNTER — OFFICE VISIT (OUTPATIENT)
Dept: NEUROLOGY | Age: 73
End: 2018-07-18

## 2018-07-18 DIAGNOSIS — R55 SYNCOPE, UNSPECIFIED SYNCOPE TYPE: Primary | ICD-10-CM

## 2018-07-18 DIAGNOSIS — I65.23 BILATERAL CAROTID ARTERY STENOSIS: ICD-10-CM

## 2018-07-19 NOTE — PROCEDURES
Carotid Doppler:     Date:  7/18/2018     Requesting Physician:  Shell Nguyen MD     Indication:  Stenosis and syncope     B-mode imaging reveals mixed plaque at the bifurcations bilaterally extending into the internal carotid artery segments. Doppler spectral analysis reveals no significant velocity shifts. Vertebral artery flow antegrade bilaterally. Interpretation:    1. Plaque as noted. 2. Stenosis estimated at 16-49% bilateral ICA.

## 2018-09-13 LAB — LDL-C, EXTERNAL: 59

## 2018-10-22 ENCOUNTER — HOSPITAL ENCOUNTER (OUTPATIENT)
Dept: MAMMOGRAPHY | Age: 73
Discharge: HOME | End: 2018-10-22
Attending: FAMILY MEDICINE
Payer: MEDICARE

## 2018-10-22 DIAGNOSIS — Z12.31 VISIT FOR SCREENING MAMMOGRAM: ICD-10-CM

## 2018-10-22 PROCEDURE — 77063 BREAST TOMOSYNTHESIS BI: CPT

## 2018-11-13 ENCOUNTER — OFFICE VISIT (OUTPATIENT)
Dept: NEUROLOGY | Age: 73
End: 2018-11-13

## 2018-11-13 VITALS
WEIGHT: 179 LBS | HEIGHT: 64 IN | SYSTOLIC BLOOD PRESSURE: 140 MMHG | BODY MASS INDEX: 30.56 KG/M2 | DIASTOLIC BLOOD PRESSURE: 72 MMHG | RESPIRATION RATE: 20 BRPM

## 2018-11-13 DIAGNOSIS — I65.23 BILATERAL CAROTID ARTERY STENOSIS: Primary | ICD-10-CM

## 2018-11-13 RX ORDER — GLUCOSAMINE SULFATE 1500 MG
1000 POWDER IN PACKET (EA) ORAL DAILY
COMMUNITY

## 2018-11-13 NOTE — PATIENT INSTRUCTIONS
Cindi Junior 1721 What is a living will? A living will is a legal form you use to write down the kind of care you want at the end of your life. It is used by the health professionals who will treat you if you aren't able to decide for yourself. If you put your wishes in writing, your loved ones and others will know what kind of care you want. They won't need to guess. This can ease your mind and be helpful to others. A living will is not the same as an estate or property will. An estate will explains what you want to happen with your money and property after you die. Is a living will a legal document? A living will is a legal document. Each state has its own laws about living bourgeois. If you move to another state, make sure that your living will is legal in the state where you now live. Or you might use a universal form that has been approved by many states. This kind of form can sometimes be completed and stored online. Your electronic copy will then be available wherever you have a connection to the Internet. In most cases, doctors will respect your wishes even if you have a form from a different state. · You don't need an  to complete a living will. But legal advice can be helpful if your state's laws are unclear, your health history is complicated, or your family can't agree on what should be in your living will. · You can change your living will at any time. Some people find that their wishes about end-of-life care change as their health changes. · In addition to making a living will, think about completing a medical power of  form. This form lets you name the person you want to make end-of-life treatment decisions for you (your \"health care agent\") if you're not able to. Many hospitals and nursing homes will give you the forms you need to complete a living will and a medical power of .  
· Your living will is used only if you can't make or communicate decisions for yourself anymore. If you become able to make decisions again, you can accept or refuse any treatment, no matter what you wrote in your living will. · Your state may offer an online registry. This is a place where you can store your living will online so the doctors and nurses who need to treat you can find it right away. What should you think about when creating a living will? Talk about your end-of-life wishes with your family members and your doctor. Let them know what you want. That way the people making decisions for you won't be surprised by your choices. Think about these questions as you make your living will: · Do you know enough about life support methods that might be used? If not, talk to your doctor so you know what might be done if you can't breathe on your own, your heart stops, or you're unable to swallow. · What things would you still want to be able to do after you receive life-support methods? Would you want to be able to walk? To speak? To eat on your own? To live without the help of machines? · If you have a choice, where do you want to be cared for? In your home? At a hospital or nursing home? · Do you want certain Pentecostalism practices performed if you become very ill? · If you have a choice at the end of your life, where would you prefer to die? At home? In a hospital or nursing home? Somewhere else? · Would you prefer to be buried or cremated? · Do you want your organs to be donated after you die? What should you do with your living will? · Make sure that your family members and your health care agent have copies of your living will. · Give your doctor a copy of your living will to keep in your medical record. If you have more than one doctor, make sure that each one has a copy. · You may want to put a copy of your living will where it can be easily found. Where can you learn more? Go to http://obi-shay.info/. Enter C748 in the search box to learn more about \"Learning About Living Suzie. \" Current as of: April 19, 2018 Content Version: 11.8 © 2372-7136 [employer]. Care instructions adapted under license by [employer] (which disclaims liability or warranty for this information). If you have questions about a medical condition or this instruction, always ask your healthcare professional. [place] [address] any warranty or liability for your use of this information. Advance Directives: Care Instructions Your Care Instructions An advance directive is a legal way to state your wishes at the end of your life. It tells your family and your doctor what to do if you can no longer say what you want. There are two main types of advance directives. You can change them any time that your wishes change. · A living will tells your family and your doctor your wishes about life support and other treatment. · A durable power of  for health care lets you name a person to make treatment decisions for you when you can't speak for yourself. This person is called a health care agent. If you do not have an advance directive, decisions about your medical care may be made by a doctor or a  who doesn't know you. It may help to think of an advance directive as a gift to the people who care for you. If you have one, they won't have to make tough decisions by themselves. Follow-up care is a key part of your treatment and safety. Be sure to make and go to all appointments, and call your doctor if you are having problems. It's also a good idea to know your test results and keep a list of the medicines you take. How can you care for yourself at home? · Discuss your wishes with your loved ones and your doctor. This way, there are no surprises. · Many states have a unique form.  Or you might use a universal form that has been approved by many states. This kind of form can sometimes be completed and stored online. Your electronic copy will then be available wherever you have a connection to the Internet. In most cases, doctors will respect your wishes even if you have a form from a different state. · You don't need a  to do an advance directive. But you may want to get legal advice. · Think about these questions when you prepare an advance directive: 
? Who do you want to make decisions about your medical care if you are not able to? Many people choose a family member or close friend. ? Do you know enough about life support methods that might be used? If not, talk to your doctor so you understand. ? What are you most afraid of that might happen? You might be afraid of having pain, losing your independence, or being kept alive by machines. ? Where would you prefer to die? Choices include your home, a hospital, or a nursing home. ? Would you like to have information about hospice care to support you and your family? ? Do you want to donate organs when you die? ? Do you want certain Gnosticism practices performed before you die? If so, put your wishes in the advance directive. · Read your advance directive every year, and make changes as needed. When should you call for help? Be sure to contact your doctor if you have any questions. Where can you learn more? Go to http://obi-shay.info/. Enter R264 in the search box to learn more about \"Advance Directives: Care Instructions. \" Current as of: April 19, 2018 Content Version: 11.8 © 8650-1625 [employer]. Care instructions adapted under license by [employer] (which disclaims liability or warranty for this information).  If you have questions about a medical condition or this instruction, always ask your healthcare professional. [address] any warranty or liability for your use of this information.

## 2018-11-13 NOTE — PROGRESS NOTES
Sadaf We-07-A 1498 [address] [facility], Lisha Riley 57  
[address] [facility] [address] 25 [id number] Fax No chief complaint on file. Current Outpatient Medications Medication Sig Dispense Refill  cholecalciferol (VITAMIN D3) 1,000 unit cap Take  by mouth daily.  aspirin 81 mg chewable tablet Take 1 Tab by mouth daily. 30 Tab 0  pravastatin (PRAVACHOL) 40 mg tablet Take 1 Tab by mouth nightly. 30 Tab 0  
 glucosamine-chondroitin (ARTHX) 500-400 mg cap Take 2 Caps by mouth daily. No Known Allergies Social History Tobacco Use  Smoking status: Never Smoker  Smokeless tobacco: Never Used Substance Use Topics  Alcohol use: No  
 Drug use: No  
 
Patient returns today for follow-up carotid stenosis. She has 16-49% stenosis of the bilateral internal carotid arteries. Last carotid Doppler was July of this year. This is no significant change in the film was personally reviewed. She reports no new complaint. She has not had any symptoms of visual loss or numbness or tingling or other focal complaint. No dizziness. She stays active. She cuts her 4 acres of yard. She visits with her grandson every afternoon as he is 9 comes to her house after he gets off the bus. She also stays active about the house. Happy with how she is doing. She is lost 20 pounds. Examination Visit Vitals /72 Resp 20 Ht 5' 4\" (1.626 m) Wt 81.2 kg (179 lb) BMI 30.73 kg/m² She is a very pleasant lady. She is awake alert oriented conversant with quite appropriate dress grooming and affect. She is bright and outgoing. She has no icterus. No edema. Symmetric pulses. Heart regular. Intact cranial nerves II-XII. No nystagmus. No pronation drift or abnormal movement. Her strength is full throughout in the upper and lower extremity's bilaterally. No reflex asymmetry. No pathologic reflexes. No ataxia. Steady gait. Impression/Plan Mild carotid stenosis. Continue to do a Doppler yearly. We will schedule the next Doppler for July 2019 and we will try to see her that same day to facilitate not having her make a couple of trips that she had to do this year. Izabella Amin MD 
 
This note will not be viewable in [address] This note was created using voice recognition software. Despite editing, there may be syntax errors.

## 2019-05-09 ENCOUNTER — HOSPITAL ENCOUNTER (OUTPATIENT)
Dept: LAB | Age: 74
Discharge: HOME | End: 2019-05-09
Payer: MEDICARE

## 2019-05-09 PROCEDURE — 36415 COLL VENOUS BLD VENIPUNCTURE: CPT

## 2019-05-09 PROCEDURE — 80061 LIPID PANEL: CPT

## 2019-05-09 PROCEDURE — 80076 HEPATIC FUNCTION PANEL: CPT

## 2019-05-10 LAB
ALBUMIN SERPL-MCNC: 4.3 G/DL (ref 3.5–4.8)
ALP SERPL-CCNC: 46 IU/L (ref 39–117)
ALT SERPL-CCNC: 13 IU/L (ref 0–32)
AST SERPL-CCNC: 18 IU/L (ref 0–40)
BILIRUB DIRECT SERPL-MCNC: 0.1 MG/DL (ref 0–0.4)
BILIRUB SERPL-MCNC: 0.3 MG/DL (ref 0–1.2)
CHOLEST SERPL-MCNC: 147 MG/DL (ref 100–199)
HDLC SERPL-MCNC: 75 MG/DL
LDLC SERPL CALC-MCNC: 61 MG/DL (ref 0–99)
PROT SERPL-MCNC: 6.3 G/DL (ref 6–8.5)
TRIGL SERPL-MCNC: 57 MG/DL (ref 0–149)
VLDLC SERPL CALC-MCNC: 11 MG/DL (ref 5–40)

## 2019-06-07 ENCOUNTER — OFFICE VISIT (OUTPATIENT)
Dept: CARDIOLOGY CLINIC | Age: 74
End: 2019-06-07

## 2019-06-07 VITALS
OXYGEN SATURATION: 96 % | RESPIRATION RATE: 16 BRPM | DIASTOLIC BLOOD PRESSURE: 72 MMHG | SYSTOLIC BLOOD PRESSURE: 124 MMHG | BODY MASS INDEX: 26.46 KG/M2 | HEIGHT: 64 IN | WEIGHT: 155 LBS | HEART RATE: 79 BPM

## 2019-06-07 DIAGNOSIS — E78.5 DYSLIPIDEMIA (HIGH LDL; LOW HDL): ICD-10-CM

## 2019-06-07 DIAGNOSIS — R55 SYNCOPE, UNSPECIFIED SYNCOPE TYPE: ICD-10-CM

## 2019-06-07 DIAGNOSIS — R06.02 SOB (SHORTNESS OF BREATH): Primary | ICD-10-CM

## 2019-06-07 DIAGNOSIS — R06.02 SOB (SHORTNESS OF BREATH): ICD-10-CM

## 2019-06-07 NOTE — PROGRESS NOTES
LAST OFFICE VISIT : 5/11/2018        ICD-10-CM ICD-9-CM   1. SOB (shortness of breath) R06.02 786.05   2. Dyslipidemia (high LDL; low HDL) E78.5 272.4   3. Syncope, unspecified syncope type R55 780.2            Genaro Sarabia is a 75 y.o. female with dyslipidemia referred for follow up. Cardiac risk factors: family history, dyslipidemia, post-menopausal  I have personally obtained the history from the patient. HISTORY OF PRESENTING ILLNESS     Overall the pt states she is doing well. Pt states that she tries to stay physically active with biking, and she's lost 24 lbs since November 2018. Pt has also been eating healthier diet. The patient denies chest pain/ shortness of breath, orthopnea, PND, LE edema, palpitations, syncope, presyncope or fatigue.          ACTIVE PROBLEM LIST     Patient Active Problem List    Diagnosis Date Noted    Urinary incontinence 02/18/2017    Syncope 02/17/2017           PAST MEDICAL HISTORY     Past Medical History:   Diagnosis Date    Anxiety     Arthritis     Fatigue     Leg swelling     Polio     SOB (shortness of breath)            PAST SURGICAL HISTORY     Past Surgical History:   Procedure Laterality Date    BREAST SURGERY PROCEDURE UNLISTED  1975    HX ORTHOPAEDIC  1    NAVEEN BIOPSY BREAST STEREOTACTIC Right 1980    negative          ALLERGIES     No Known Allergies       FAMILY HISTORY     Family History   Problem Relation Age of Onset    Dementia Mother     Cancer Mother 80        ovarian    Heart Disease Father     negative for cardiac disease       SOCIAL HISTORY     Social History     Socioeconomic History    Marital status:      Spouse name: Not on file    Number of children: Not on file    Years of education: Not on file    Highest education level: Not on file   Tobacco Use    Smoking status: Never Smoker    Smokeless tobacco: Never Used   Substance and Sexual Activity    Alcohol use: No    Drug use: No         MEDICATIONS Current Outpatient Medications   Medication Sig    cholecalciferol (VITAMIN D3) 1,000 unit cap Take  by mouth daily.  aspirin 81 mg chewable tablet Take 1 Tab by mouth daily.  pravastatin (PRAVACHOL) 40 mg tablet Take 1 Tab by mouth nightly.  glucosamine-chondroitin (ARTHX) 500-400 mg cap Take 2 Caps by mouth daily. No current facility-administered medications for this visit. I have reviewed the nurses notes, vitals, problem list, allergy list, medical history, family, social history and medications. REVIEW OF SYMPTOMS      General: Pt denies excessive weight gain or loss. Pt is able to conduct ADL's  HEENT: Denies blurred vision, headaches, hearing loss, epistaxis and difficulty swallowing. Respiratory: Denies cough, congestion, shortness of breath, INFANTE, wheezing or stridor. Cardiovascular: Denies precordial pain, palpitations, edema or PND  Gastrointestinal: Denies poor appetite, indigestion, abdominal pain or blood in stool  Genitourinary: Denies hematuria, dysuria, increased urinary frequency  Musculoskeletal: Denies joint pain or swelling from muscles or joints  Neurologic: Denies tremor, paresthesias, headache, or sensory motor disturbance  Psychiatric: Denies confusion, insomnia, depression  Integumentray: Denies rash, itching or ulcers. Hematologic: Denies easy bruising, bleeding     PHYSICAL EXAMINATION      Vitals:    06/07/19 1038   BP: 124/72   Pulse: 79   Resp: 16   SpO2: 96%   Weight: 155 lb (70.3 kg)   Height: 5' 4\" (1.626 m)     General: Well developed, in no acute distress. HEENT: No jaundice, oral mucosa moist, no oral ulcers  Neck: Supple, no stiffness, no lymphadenopathy, supple  Heart:  Normal S1/S2 negative S3 or S4.  Regular, no murmur, gallop or rub, no jugular venous distention  Respiratory: Clear bilaterally x 4, no wheezing or rales  Abdomen:   Soft, non-tender, bowel sounds are active.   Extremities:  No edema, normal cap refill, no cyanosis. Musculoskeletal: No clubbing, no deformities  Neuro: A&Ox3, speech clear, gait stable, cooperative, no focal neurologic deficits  Skin: Skin color is normal. No rashes or lesions. Non diaphoretic, moist.  Vascular: 2+ pulses symmetric in all extremities     DIAGNOSTIC DATA     1. Echo  2/17/17- EF 60%    2. Lipids  2/19/17- , HDL 79, .8, TG 86  9/19/17- , HDL 79, LDL 44, TG 44  5/2/18 - , HDL 67, LDL 60, TG 82  9/14/18- , HDL 71, LDL 59, TG 94  5/9/19- , HDL 75, LDL 61, TG 57    3. Carotid Doppler  2/19/17- R 50-69%, L 0-49%  8/9/17- R 16-49%, L 1-15%  7/18/18- Jhonatan 16-49%    4. Myocardial perfusion study  5/8/17- (exercise stress) no ischemia    5. Loop  2/23/17 - 3/24/17- NSR         LABORATORY DATA            Lab Results   Component Value Date/Time    WBC 6.8 02/18/2017 05:28 AM    HGB 13.7 02/18/2017 05:28 AM    HCT 40.6 02/18/2017 05:28 AM    PLATELET 767 76/47/3328 05:28 AM    MCV 92.5 02/18/2017 05:28 AM      Lab Results   Component Value Date/Time    Sodium 144 02/18/2017 05:28 AM    Potassium 3.9 02/18/2017 05:28 AM    Chloride 107 02/18/2017 05:28 AM    CO2 29 02/18/2017 05:28 AM    Anion gap 8 02/18/2017 05:28 AM    Glucose 95 02/18/2017 05:28 AM    BUN 11 02/18/2017 05:28 AM    Creatinine 0.68 02/18/2017 05:28 AM    BUN/Creatinine ratio 16 02/18/2017 05:28 AM    GFR est AA >60 02/18/2017 05:28 AM    GFR est non-AA >60 02/18/2017 05:28 AM    Calcium 8.1 (L) 02/18/2017 05:28 AM    Bilirubin, total 0.3 05/09/2019 08:11 AM    AST (SGOT) 18 05/09/2019 08:11 AM    Alk. phosphatase 46 05/09/2019 08:11 AM    Protein, total 6.3 05/09/2019 08:11 AM    Albumin 4.3 05/09/2019 08:11 AM    Globulin 2.9 02/18/2017 05:28 AM    A-G Ratio 1.2 02/18/2017 05:28 AM    ALT (SGPT) 13 05/09/2019 08:11 AM           ASSESSMENT/RECOMMENDATIONS:.      1. Syncope  - She did not complain of any syncope today.     - She has bilateral carotid stenosis and her disease is minimal.    - Also followed by Dr. Maria Del Carmen Omer. - All cardiac testing thus far has thus far been negative . Loop also demonstrated SR so she has no arrhythmias that's contributing to her syncope. 2. Dyslipidemia  - Lipids are at goal on current medical regimen. 3. Return in 1 year or PRN. Orders Placed This Encounter    HEPATIC FUNCTION PANEL     Standing Status:   Future     Standing Expiration Date:   6/7/2020    LIPID PANEL          Follow-up and Dispositions  ·   Return in about 6 months (around 12/7/2019). I have discussed the diagnosis with  Yuri Murillo and the intended plan as seen in the above orders. Questions were answered concerning future plans. I have discussed medication side effects and warnings with the patient as well. Thank you,  Izabel Atkinson MD for involving me in the care of  Yuri Murillo. Please do not hesitate to contact me for further questions/concerns. Written by Pearline Severs, as dictated by Zain Gilmore MD.     Joan Estrella MD, [facility]    Patient Care Team:  Izabel Atkinson MD as PCP - General (Family Practice)  Dotty Maldonado MD (Breast Surgery)  Jodie Garber MD (Cardiology)    [address], [address] [place] [address] Drive      [phone] / [phone] Fax

## 2019-06-07 NOTE — PROGRESS NOTES
1. Have you been to the ER, urgent care clinic since your last visit? Hospitalized since your last visit? No    2. Have you seen or consulted any other health care providers outside of the [address] [place] [address] since your last visit? Include any pap smears or colon screening. No     Patient reports med rec verbally completed. Chief Complaint   Patient presents with    Cholesterol Problem     74 y/o female reports no concerns doing this visit.       Visit Vitals  /72 (BP 1 Location: Right arm, BP Patient Position: Sitting)   Pulse 79   Resp 16   Ht 5' 4\" (1.626 m)   Wt 155 lb (70.3 kg)   SpO2 96%   BMI 26.61 kg/m²

## 2019-06-07 NOTE — LETTER
6/7/19 Patient: Omer Suárez YOB: 1945 Date of Visit: 6/7/2019 José Miguel Amin, [id number] [facility] [address] [place] [address] VIA Facsimile: [phone] Dear José Miguel Amin MD, Thank you for referring Ms. Omer Suárez to [facility] for evaluation. My notes for this consultation are attached. If you have questions, please do not hesitate to call me. I look forward to following your patient along with you.  
 
 
Sincerely, 
 
Donovan Jules MD

## 2019-06-26 ENCOUNTER — DOCUMENTATION ONLY (OUTPATIENT)
Dept: CARDIOLOGY CLINIC | Age: 74
End: 2019-06-26

## 2019-09-09 ENCOUNTER — OFFICE VISIT (OUTPATIENT)
Dept: OBGYN CLINIC | Age: 74
End: 2019-09-09

## 2019-09-09 VITALS
WEIGHT: 155 LBS | BODY MASS INDEX: 26.46 KG/M2 | DIASTOLIC BLOOD PRESSURE: 72 MMHG | HEIGHT: 64 IN | SYSTOLIC BLOOD PRESSURE: 124 MMHG

## 2019-09-09 DIAGNOSIS — N81.11 CYSTOCELE, MIDLINE: ICD-10-CM

## 2019-09-09 DIAGNOSIS — N81.4 UTERINE PROLAPSE: Primary | ICD-10-CM

## 2019-09-09 NOTE — PATIENT INSTRUCTIONS
Uterine Prolapse: Care Instructions  Your Care Instructions    When the uterus moves down in the pelvis and starts to press into the vagina, it is called uterine prolapse. This happens when the pelvic muscles and tissues get weak. Women often have this problem after they have children or when they get older. It can also happen if you are overweight or you have a long-term cough. These put extra pressure on the uterus. This problem may cause you to leak urine. Or you may have trouble passing urine or stool. You may feel pain during sex. But in most cases, prolapse doesn't cause more serious health problems. You may feel better if you change how you do some of your daily activities. And you can try exercises to make your pelvic muscles strong. But if these don't help, you may want to talk with your doctor about surgery. Follow-up care is a key part of your treatment and safety. Be sure to make and go to all appointments, and call your doctor if you are having problems. It's also a good idea to know your test results and keep a list of the medicines you take. How can you care for yourself at home? · Do not do activities that put pressure on your pelvic muscles. This includes heavy lifting and straining. · Do exercises to tighten and strengthen your pelvic muscles. These are called Kegel exercises. To do them:  ? Squeeze the muscles you use to stop urine. Your belly and thighs should not move. ? Hold the squeeze for 3 seconds. Then relax for 3 seconds. ? Start with 3 seconds. Then add 1 second each week until you are able to squeeze for 10 seconds. ? Repeat this 10 to 15 times. Do these exercises 3 or more times a day. · Take an over-the-counter pain medicine, such as acetaminophen (Tylenol), ibuprofen (Advil, Motrin), or naproxen (Aleve), to relieve pain. Read and follow all instructions on the label. · Do not take two or more pain medicines at the same time unless the doctor told you to.  Many pain medicines have acetaminophen, which is Tylenol. Too much acetaminophen (Tylenol) can be harmful. · Talk with your doctor about a vaginal pessary. This is a device that you put in your vagina to support the uterus. Your doctor can teach you how and when to remove it. You will also learn how to clean it and put it back in.  · If your doctor prescribes estrogen cream for your vagina, use it exactly as prescribed. · To relieve pressure on your vagina, lie down and put a pillow under your knees. Or you can lie on your side and bring your knees up to your chest.  · If you are overweight, talk to your doctor about safe ways to lose weight. When should you call for help? Watch closely for changes in your health, and be sure to contact your doctor if:    · You have new urinary symptoms. These may include leaking urine, having pain when urinating, or feeling like you need to urinate often.     · You have trouble passing stool.     · You have pain or a feeling of fullness in your vagina.     · You do not get better as expected. Where can you learn more? Go to http://obi-shay.info/. Enter W688 in the search box to learn more about \"Uterine Prolapse: Care Instructions. \"  Current as of: February 19, 2019  Content Version: 12.1  © 5175-2143 [employer]. Care instructions adapted under license by [employer] (which disclaims liability or warranty for this information). If you have questions about a medical condition or this instruction, always ask your healthcare professional. [place] [address] any warranty or liability for your use of this information.

## 2019-09-09 NOTE — PROGRESS NOTES
Prolapse evaluation  Chief Complaint   Uterine Prolapse    HPI:  The patient specifically complains of vaginal pressure and a bulging sensation at the introitus. The bulge is visible to the patient. The patient states she has no difficulty emptying her bladder. She states that she has no problems completing bowel movements. She also complains of urinary incontinence, which is mild. She doesn't have to wear a pad. She states the symptoms are accentuated by lifting, straining, and exercise. The symptoms are partially relieved by lying down. She has had no pelvic pain associated with this. The character of the pain is described as a pressure sensation located in the vagina. The patient has the following additional complaints: none  Past Medical History:   Diagnosis Date    Anxiety     Arthritis     Fatigue     Leg swelling     Polio     SOB (shortness of breath)      Past Surgical History:   Procedure Laterality Date    BREAST SURGERY PROCEDURE UNLISTED  1975    HX ORTHOPAEDIC  1    NAVEEN BIOPSY BREAST STEREOTACTIC Right 1980    negative     Social History     Occupational History    Not on file   Tobacco Use    Smoking status: Never Smoker    Smokeless tobacco: Never Used   Substance and Sexual Activity    Alcohol use: No    Drug use: No    Sexual activity: Not Currently     Partners: Male     Birth control/protection: None     Family History   Problem Relation Age of Onset    Dementia Mother     Ovarian Cancer Mother 80    Heart Disease Father        No Known Allergies  Prior to Admission medications    Medication Sig Start Date End Date Taking? Authorizing Provider   aspirin 81 mg chewable tablet Take 1 Tab by mouth daily. 2/19/17  Yes Lopez Julien Lorrane Bozena V, DO   pravastatin (PRAVACHOL) 40 mg tablet Take 1 Tab by mouth nightly. 2/19/17  Yes Donelda Kristy V, DO   cholecalciferol (VITAMIN D3) 1,000 unit cap Take  by mouth daily.     Provider, Historical   glucosamine-chondroitin ([facility]) 500-400 mg cap Take 2 Caps by mouth daily.     Other, MD Gunnar        Review of Systems: History obtained from the patient  Constitutional: negative for weight loss, fever, night sweats  HEENT: negative for hearing loss, earache, congestion, snoring, sorethroat  CV: negative for chest pain, palpitations, edema  Resp: negative for cough, shortness of breath, wheezing  Breast: negative for breast lumps, nipple discharge, galactorrhea  GI: negative for change in bowel habits, abdominal pain, black or bloody stools  : negative for frequency, dysuria, hematuria, vaginal discharge  MSK: negative for back pain, joint pain, muscle pain  Skin: negative for itching, rash, hives  Neuro: negative for dizziness, headache, confusion, weakness  Psych: negative for anxiety, depression, change in mood  Heme/lymph: negative for bleeding, bruising, pallor    Objective:  Visit Vitals  /72   Ht 5' 4\" (1.626 m)   Wt 155 lb (70.3 kg)   BMI 26.61 kg/m²       Physical Exam:     Constitutional  · Appearance: well-nourished, well developed, alert, in no acute distress    HENT  · Head and Face: appears normal    Gastrointestinal  · Abdominal Examination: abdomen non-tender to palpation, normal bowel sounds, no masses present  · Liver and spleen: no hepatomegaly present, spleen not palpable  · Hernias: no hernias identified    Genitourinary  · External Genitalia: normal appearance for age, no discharge present, no tenderness present, no inflammatory lesions present, no masses present, no atrophy present  · Vagina: normal vaginal vault with mild to moderate cystocele, minimal rectocele, moderate vaginal apex prolapse, no discharge present, no inflammatory lesions present, no masses present  · Bladder: non-tender to palpation  · Urethra: appears normal  · Cervix: normal   · Uterus: moderate prolapse, otherwise normal size, shape and consistency  · Adnexa: no adnexal tenderness present, no adnexal masses present  · Perineum: perineum within normal limits, no evidence of trauma, no rashes or skin lesions present  · Anus: anus within normal limits, no hemorrhoids present  · Inguinal Lymph Nodes: no lymphadenopathy present    Skin  · General Inspection: no rash, no lesions identified    Neurologic/Psychiatric  · Mental Status:  · Orientation: grossly oriented to person, place and time  · Mood and Affect: mood normal, affect appropriate    Assessment:  Moderate prolapse of the uterus and bladder, which are only mildly symptomatic. Plan:   Advised of pessary and surgical options. Advised she probably does not need any treatment at this time. If things are worsening over the next 6 months she will get back to me. Otherwise, RTO in 6 months for follow up. I spent 45 minutes with the patient, more than half of which was face to face counseling. RTO prn if symptoms persist or worsen. Instructions given to pt. Handouts given to pt.

## 2019-10-07 ENCOUNTER — OFFICE VISIT (OUTPATIENT)
Dept: NEUROLOGY | Age: 74
End: 2019-10-07

## 2019-10-07 VITALS
WEIGHT: 157 LBS | HEIGHT: 64 IN | SYSTOLIC BLOOD PRESSURE: 132 MMHG | OXYGEN SATURATION: 99 % | BODY MASS INDEX: 26.8 KG/M2 | RESPIRATION RATE: 16 BRPM | HEART RATE: 72 BPM | DIASTOLIC BLOOD PRESSURE: 72 MMHG

## 2019-10-07 DIAGNOSIS — I65.23 BILATERAL CAROTID ARTERY STENOSIS: Primary | ICD-10-CM

## 2019-10-07 NOTE — PROGRESS NOTES
[facility] and [address] at [facility] at [address] [facility], [address] [facility] [address] [facility], [address] [place] [address]   [phone]              No chief complaint on file. Current Outpatient Medications   Medication Sig Dispense Refill    cholecalciferol (VITAMIN D3) 1,000 unit cap Take  by mouth daily.  aspirin 81 mg chewable tablet Take 1 Tab by mouth daily. 30 Tab 0    pravastatin (PRAVACHOL) 40 mg tablet Take 1 Tab by mouth nightly. 30 Tab 0    glucosamine-chondroitin (ARTHX) 500-400 mg cap Take 2 Caps by mouth daily. No Known Allergies  Social History     Tobacco Use    Smoking status: Never Smoker    Smokeless tobacco: Never Used   Substance Use Topics    Alcohol use: No    Drug use: No     Patient returns today for follow-up carotid stenosis. She just had a Doppler done today. This reviewed. It is unchanged from previous at 16-49%. No strokes at this. Taking her statin and her aspirin. No new complaint. She continues to cut grass and clean her house. She did get caught by her stepson on the stepladder. No falls. No new complaint. Blood pressures been good. Examination  Visit Vitals  /72 (BP 1 Location: Left arm, BP Patient Position: Sitting)   Pulse 72   Resp 16   Ht 5' 4\" (1.626 m)   Wt 71.2 kg (157 lb)   SpO2 99%   BMI 26.95 kg/m²     Very pleasant lady. Awake alert oriented conversant. Normal speech and language. Normal cognition. Cranial nerves are intact 2-12. She has no nystagmus. She has no pronation or drift. No abnormal movement. Full strength. Reflexes symmetric. No ataxia. Steady gait    Impression/Plan  Carotid stenosis doing well. Continue with yearly Doppler. I will see her the same day to facilitate. Candance Peek, MD      This note was created using voice recognition software. Despite editing, there may be syntax errors. This note will not be viewable in [address]

## 2019-10-23 ENCOUNTER — HOSPITAL ENCOUNTER (OUTPATIENT)
Dept: MAMMOGRAPHY | Age: 74
Discharge: HOME | End: 2019-10-23
Attending: FAMILY MEDICINE
Payer: MEDICARE

## 2019-10-23 DIAGNOSIS — Z12.39 SCREENING BREAST EXAMINATION: ICD-10-CM

## 2019-10-23 PROCEDURE — 77063 BREAST TOMOSYNTHESIS BI: CPT

## 2019-10-28 ENCOUNTER — HOSPITAL ENCOUNTER (OUTPATIENT)
Dept: MAMMOGRAPHY | Age: 74
Discharge: HOME | End: 2019-10-28
Attending: FAMILY MEDICINE
Payer: MEDICARE

## 2019-10-28 DIAGNOSIS — R92.8 ABNORMAL MAMMOGRAM: ICD-10-CM

## 2019-10-28 LAB — MAMMOGRAPHY, EXTERNAL: 0

## 2019-10-28 PROCEDURE — 77065 DX MAMMO INCL CAD UNI: CPT

## 2019-11-08 ENCOUNTER — TELEPHONE (OUTPATIENT)
Dept: CARDIOLOGY CLINIC | Age: 74
End: 2019-11-08

## 2019-11-08 RX ORDER — PRAVASTATIN SODIUM 40 MG/1
40 TABLET ORAL
Qty: 30 TAB | Refills: 0 | Status: CANCELLED | OUTPATIENT
Start: 2019-11-08

## 2019-11-11 LAB — CREATININE, EXTERNAL: 0.86

## 2020-03-05 NOTE — PROGRESS NOTES
Prolapse evaluation  Chief Complaint   Uterine Prolapse    HPI:  The patient specifically complains of vaginal pressure and a bulging sensation at the introitus for the last 6 months. She states since she was seen last, there has been no change. The bulge is visible to the patient. The patient states she has no difficulty emptying her bladder. She states that she has no problems completing bowel movements. She also complains of urinary incontinence, which is mild. She doesn't have to wear a pad. She states the symptoms are accentuated by lifting, straining, and exercise. The symptoms are partially relieved by lying down. She has had no pelvic pain associated with this. The character of the pain is described as a pressure sensation located in the vagina. The patient has the following additional complaints: none  Past Medical History:   Diagnosis Date    Anxiety     Arthritis     Fatigue     History of bone density study 2015 neg    Leg swelling     Polio     SOB (shortness of breath)      Past Surgical History:   Procedure Laterality Date    BREAST SURGERY PROCEDURE UNLISTED  1975    HX ORTHOPAEDIC  1    NAVEEN BIOPSY BREAST STEREOTACTIC Right 1980    negative     Social History     Occupational History    Not on file   Tobacco Use    Smoking status: Never Smoker    Smokeless tobacco: Never Used   Substance and Sexual Activity    Alcohol use: No    Drug use: No    Sexual activity: Not Currently     Partners: Male     Birth control/protection: None     Family History   Problem Relation Age of Onset    Dementia Mother     Ovarian Cancer Mother 80    Heart Disease Father        No Known Allergies  Prior to Admission medications    Medication Sig Start Date End Date Taking? Authorizing Provider   cholecalciferol (VITAMIN D3) 1,000 unit cap Take  by mouth daily. Provider, Historical   aspirin 81 mg chewable tablet Take 1 Tab by mouth daily.  2/19/17   Vielka Feeler, DO pravastatin (PRAVACHOL) 40 mg tablet Take 1 Tab by mouth nightly. 2/19/17   Reyes Clause Romeo Pacini, Sherwin Och, DO   glucosamine-chondroitin (ARTHX) 500-400 mg cap Take 2 Caps by mouth daily. Other, MD Gunnar        Review of Systems: History obtained from the patient  Constitutional: negative for weight loss, fever, night sweats  HEENT: negative for hearing loss, earache, congestion, snoring, sorethroat  CV: negative for chest pain, palpitations, edema  Resp: negative for cough, shortness of breath, wheezing  Breast: negative for breast lumps, nipple discharge, galactorrhea  GI: negative for change in bowel habits, abdominal pain, black or bloody stools  : negative for frequency, dysuria, hematuria, vaginal discharge  MSK: negative for back pain, joint pain, muscle pain  Skin: negative for itching, rash, hives  Neuro: negative for dizziness, headache, confusion, weakness  Psych: negative for anxiety, depression, change in mood  Heme/lymph: negative for bleeding, bruising, pallor    Objective: There were no vitals taken for this visit.     Physical Exam:     Constitutional  · Appearance: well-nourished, well developed, alert, in no acute distress    HENT  · Head and Face: appears normal    Gastrointestinal  · Abdominal Examination: abdomen non-tender to palpation, normal bowel sounds, no masses present  · Liver and spleen: no hepatomegaly present, spleen not palpable  · Hernias: no hernias identified    Genitourinary  · External Genitalia: normal appearance for age, no discharge present, no tenderness present, no inflammatory lesions present, no masses present, no atrophy present  · Vagina: normal vaginal vault with mild to moderate cystocele, minimal rectocele, moderate vaginal apex prolapse, no discharge present, no inflammatory lesions present, no masses present  · Bladder: non-tender to palpation  · Urethra: appears normal  · Cervix: normal   · Uterus: moderate prolapse, otherwise normal size, shape and consistency  · Adnexa: no adnexal tenderness present, no adnexal masses present  · Perineum: perineum within normal limits, no evidence of trauma, no rashes or skin lesions present  · Anus: anus within normal limits, no hemorrhoids present  · Inguinal Lymph Nodes: no lymphadenopathy present    Skin  · General Inspection: no rash, no lesions identified    Neurologic/Psychiatric  · Mental Status:  · Orientation: grossly oriented to person, place and time  · Mood and Affect: mood normal, affect appropriate    Assessment:  No change in exam  Asymptomatic    Plan:   RTO PRN. No treatment needed at this time. RTO prn if symptoms persist or worsen. Instructions given to pt. Handouts given to pt.

## 2020-03-09 ENCOUNTER — OFFICE VISIT (OUTPATIENT)
Dept: OBGYN CLINIC | Age: 75
End: 2020-03-09

## 2020-03-09 DIAGNOSIS — N81.4 UTERINE PROLAPSE: ICD-10-CM

## 2020-03-09 DIAGNOSIS — N81.11 CYSTOCELE, MIDLINE: Primary | ICD-10-CM

## 2020-03-09 NOTE — PATIENT INSTRUCTIONS
Uterine Prolapse: Care Instructions  Your Care Instructions    When the uterus moves down in the pelvis and starts to press into the vagina, it is called uterine prolapse. This happens when the pelvic muscles and tissues get weak. Women often have this problem after they have children or when they get older. It can also happen if you are overweight or you have a long-term cough. These put extra pressure on the uterus. This problem may cause you to leak urine. Or you may have trouble passing urine or stool. You may feel pain during sex. But in most cases, prolapse doesn't cause more serious health problems. You may feel better if you change how you do some of your daily activities. And you can try exercises to make your pelvic muscles strong. But if these don't help, you may want to talk with your doctor about surgery. Follow-up care is a key part of your treatment and safety. Be sure to make and go to all appointments, and call your doctor if you are having problems. It's also a good idea to know your test results and keep a list of the medicines you take. How can you care for yourself at home? · Do not do activities that put pressure on your pelvic muscles. This includes heavy lifting and straining. · Do exercises to tighten and strengthen your pelvic muscles. These are called Kegel exercises. To do them:  ? Squeeze the muscles you use to stop urine. Your belly and thighs should not move. ? Hold the squeeze for 3 seconds. Then relax for 3 seconds. ? Start with 3 seconds. Then add 1 second each week until you are able to squeeze for 10 seconds. ? Repeat this 10 to 15 times. Do these exercises 3 or more times a day. · Take an over-the-counter pain medicine, such as acetaminophen (Tylenol), ibuprofen (Advil, Motrin), or naproxen (Aleve), to relieve pain. Read and follow all instructions on the label. · Do not take two or more pain medicines at the same time unless the doctor told you to.  Many pain medicines have acetaminophen, which is Tylenol. Too much acetaminophen (Tylenol) can be harmful. · Talk with your doctor about a vaginal pessary. This is a device that you put in your vagina to support the uterus. Your doctor can teach you how and when to remove it. You will also learn how to clean it and put it back in.  · If your doctor prescribes estrogen cream for your vagina, use it exactly as prescribed. · To relieve pressure on your vagina, lie down and put a pillow under your knees. Or you can lie on your side and bring your knees up to your chest.  · If you are overweight, talk to your doctor about safe ways to lose weight. When should you call for help? Watch closely for changes in your health, and be sure to contact your doctor if:    · You have new urinary symptoms. These may include leaking urine, having pain when urinating, or feeling like you need to urinate often.     · You have trouble passing stool.     · You have pain or a feeling of fullness in your vagina.     · You do not get better as expected. Where can you learn more? Go to http://obi-shay.info/. Enter P036 in the search box to learn more about \"Uterine Prolapse: Care Instructions. \"  Current as of: February 19, 2019  Content Version: 12.2  © 3753-1315 [employer]. Care instructions adapted under license by [employer] (which disclaims liability or warranty for this information). If you have questions about a medical condition or this instruction, always ask your healthcare professional. [place] [address] any warranty or liability for your use of this information.

## 2020-05-19 ENCOUNTER — HOSPITAL ENCOUNTER (OUTPATIENT)
Dept: LAB | Age: 75
Discharge: HOME | End: 2020-05-19
Payer: MEDICARE

## 2020-05-19 PROCEDURE — 80076 HEPATIC FUNCTION PANEL: CPT

## 2020-05-19 PROCEDURE — 80061 LIPID PANEL: CPT

## 2020-05-19 PROCEDURE — 36415 COLL VENOUS BLD VENIPUNCTURE: CPT

## 2020-05-20 LAB
ALBUMIN SERPL-MCNC: 4.1 G/DL (ref 3.7–4.7)
ALP SERPL-CCNC: 46 IU/L (ref 39–117)
ALT SERPL-CCNC: 12 IU/L (ref 0–32)
AST SERPL-CCNC: 16 IU/L (ref 0–40)
BILIRUB DIRECT SERPL-MCNC: 0.12 MG/DL (ref 0–0.4)
BILIRUB SERPL-MCNC: 0.3 MG/DL (ref 0–1.2)
CHOLEST SERPL-MCNC: 147 MG/DL (ref 100–199)
HDLC SERPL-MCNC: 71 MG/DL
LDLC SERPL CALC-MCNC: 64 MG/DL (ref 0–99)
PROT SERPL-MCNC: 5.5 G/DL (ref 6–8.5)
TRIGL SERPL-MCNC: 62 MG/DL (ref 0–149)
VLDLC SERPL CALC-MCNC: 12 MG/DL (ref 5–40)

## 2020-05-21 DIAGNOSIS — E78.5 DYSLIPIDEMIA (HIGH LDL; LOW HDL): Primary | ICD-10-CM

## 2020-07-11 ENCOUNTER — APPOINTMENT (OUTPATIENT)
Dept: CT IMAGING | Age: 75
End: 2020-07-11
Attending: NURSE PRACTITIONER
Payer: MEDICARE

## 2020-07-11 ENCOUNTER — HOSPITAL ENCOUNTER (EMERGENCY)
Age: 75
Discharge: HOME | End: 2020-07-11
Attending: EMERGENCY MEDICINE | Admitting: EMERGENCY MEDICINE
Payer: MEDICARE

## 2020-07-11 VITALS
WEIGHT: 158 LBS | SYSTOLIC BLOOD PRESSURE: 128 MMHG | HEIGHT: 66 IN | OXYGEN SATURATION: 96 % | RESPIRATION RATE: 17 BRPM | DIASTOLIC BLOOD PRESSURE: 67 MMHG | HEART RATE: 79 BPM | BODY MASS INDEX: 25.39 KG/M2 | TEMPERATURE: 97.8 F

## 2020-07-11 DIAGNOSIS — R55 SYNCOPE AND COLLAPSE: Primary | ICD-10-CM

## 2020-07-11 LAB
ALBUMIN SERPL-MCNC: 3.3 G/DL (ref 3.5–5)
ALBUMIN/GLOB SERPL: 1.2 {RATIO} (ref 1.1–2.2)
ALP SERPL-CCNC: 49 U/L (ref 45–117)
ALT SERPL-CCNC: 19 U/L (ref 12–78)
ANION GAP SERPL CALC-SCNC: 6 MMOL/L (ref 5–15)
APPEARANCE UR: CLEAR
AST SERPL-CCNC: 15 U/L (ref 15–37)
BACTERIA URNS QL MICRO: NEGATIVE /HPF
BASOPHILS # BLD: 0 K/UL (ref 0–0.1)
BASOPHILS NFR BLD: 1 % (ref 0–1)
BILIRUB SERPL-MCNC: 0.4 MG/DL (ref 0.2–1)
BILIRUB UR QL: NEGATIVE
BUN SERPL-MCNC: 17 MG/DL (ref 6–20)
BUN/CREAT SERPL: 19 (ref 12–20)
CALCIUM SERPL-MCNC: 8.6 MG/DL (ref 8.5–10.1)
CHLORIDE SERPL-SCNC: 107 MMOL/L (ref 97–108)
CO2 SERPL-SCNC: 29 MMOL/L (ref 21–32)
COLOR UR: ABNORMAL
COMMENT, HOLDF: NORMAL
CREAT SERPL-MCNC: 0.89 MG/DL (ref 0.55–1.02)
DIFFERENTIAL METHOD BLD: NORMAL
EOSINOPHIL # BLD: 0.1 K/UL (ref 0–0.4)
EOSINOPHIL NFR BLD: 2 % (ref 0–7)
EPITH CASTS URNS QL MICRO: ABNORMAL /LPF
ERYTHROCYTE [DISTWIDTH] IN BLOOD BY AUTOMATED COUNT: 11.9 % (ref 11.5–14.5)
GLOBULIN SER CALC-MCNC: 2.8 G/DL (ref 2–4)
GLUCOSE SERPL-MCNC: 98 MG/DL (ref 65–100)
GLUCOSE UR STRIP.AUTO-MCNC: NEGATIVE MG/DL
HCT VFR BLD AUTO: 37 % (ref 35–47)
HGB BLD-MCNC: 12.7 G/DL (ref 11.5–16)
HGB UR QL STRIP: NEGATIVE
HYALINE CASTS URNS QL MICRO: ABNORMAL /LPF (ref 0–5)
IMM GRANULOCYTES # BLD AUTO: 0 K/UL (ref 0–0.04)
IMM GRANULOCYTES NFR BLD AUTO: 0 % (ref 0–0.5)
KETONES UR QL STRIP.AUTO: NEGATIVE MG/DL
LEUKOCYTE ESTERASE UR QL STRIP.AUTO: ABNORMAL
LYMPHOCYTES # BLD: 1.6 K/UL (ref 0.8–3.5)
LYMPHOCYTES NFR BLD: 30 % (ref 12–49)
MAGNESIUM SERPL-MCNC: 1.9 MG/DL (ref 1.6–2.4)
MCH RBC QN AUTO: 32 PG (ref 26–34)
MCHC RBC AUTO-ENTMCNC: 34.3 G/DL (ref 30–36.5)
MCV RBC AUTO: 93.2 FL (ref 80–99)
MONOCYTES # BLD: 0.6 K/UL (ref 0–1)
MONOCYTES NFR BLD: 11 % (ref 5–13)
NEUTS SEG # BLD: 3.1 K/UL (ref 1.8–8)
NEUTS SEG NFR BLD: 56 % (ref 32–75)
NITRITE UR QL STRIP.AUTO: NEGATIVE
NRBC # BLD: 0 K/UL (ref 0–0.01)
NRBC BLD-RTO: 0 PER 100 WBC
PH UR STRIP: 7 [PH] (ref 5–8)
PLATELET # BLD AUTO: 248 K/UL (ref 150–400)
PMV BLD AUTO: 9.8 FL (ref 8.9–12.9)
POTASSIUM SERPL-SCNC: 3.6 MMOL/L (ref 3.5–5.1)
PROT SERPL-MCNC: 6.1 G/DL (ref 6.4–8.2)
PROT UR STRIP-MCNC: NEGATIVE MG/DL
RBC # BLD AUTO: 3.97 M/UL (ref 3.8–5.2)
RBC #/AREA URNS HPF: ABNORMAL /HPF (ref 0–5)
SAMPLES BEING HELD,HOLD: NORMAL
SODIUM SERPL-SCNC: 142 MMOL/L (ref 136–145)
SP GR UR REFRACTOMETRY: 1.01 (ref 1–1.03)
TROPONIN I SERPL-MCNC: <0.05 NG/ML
UR CULT HOLD, URHOLD: NORMAL
UROBILINOGEN UR QL STRIP.AUTO: 0.2 EU/DL (ref 0.2–1)
WBC # BLD AUTO: 5.5 K/UL (ref 3.6–11)
WBC URNS QL MICRO: ABNORMAL /HPF (ref 0–4)

## 2020-07-11 PROCEDURE — 36415 COLL VENOUS BLD VENIPUNCTURE: CPT

## 2020-07-11 PROCEDURE — 83735 ASSAY OF MAGNESIUM: CPT

## 2020-07-11 PROCEDURE — 80053 COMPREHEN METABOLIC PANEL: CPT

## 2020-07-11 PROCEDURE — 93005 ELECTROCARDIOGRAM TRACING: CPT

## 2020-07-11 PROCEDURE — 96360 HYDRATION IV INFUSION INIT: CPT

## 2020-07-11 PROCEDURE — 74011250636 HC RX REV CODE- 250/636: Performed by: NURSE PRACTITIONER

## 2020-07-11 PROCEDURE — 99285 EMERGENCY DEPT VISIT HI MDM: CPT

## 2020-07-11 PROCEDURE — 81001 URINALYSIS AUTO W/SCOPE: CPT

## 2020-07-11 PROCEDURE — 84484 ASSAY OF TROPONIN QUANT: CPT

## 2020-07-11 PROCEDURE — 85025 COMPLETE CBC W/AUTO DIFF WBC: CPT

## 2020-07-11 PROCEDURE — 70450 CT HEAD/BRAIN W/O DYE: CPT

## 2020-07-11 RX ADMIN — SODIUM CHLORIDE 1000 ML: 900 INJECTION, SOLUTION INTRAVENOUS at 18:29

## 2020-07-11 NOTE — ED TRIAGE NOTES
Patient arrives via ems from home after syncopal episode. Per patient she spent most of the day outside yesterday. Per patient has not been drinking as much water as she should. Per ems had episode when sitting up on stretcher.  PTA ems gave 750mL NS

## 2020-07-11 NOTE — ED PROVIDER NOTES
HPI   Is a 76-year-old female with a past medical history of 80, arthritis, fatigue, chronic left lower extremity weakness due to polio who presents the ER today for the chief complaint of syncope. Patient states that approximately 1.5 hours ago she outside in her backyard when she started to feel \"like I was going to pass out\" - described as sweaty and weak. At that time, she took a seat in a chair and had a witnessed syncopal episode that lasted less than 1 minute in duration. There is no reports of tonic-clonic activity or fixed ocular deviation. Once the patient regained consciousness, she was mildly confused, which did not last long. EMS administered 750 mL's of normal saline and brought her into an air conditioned environment, with a quick resolution in her symptoms of weakness. At this time the patient reports feeling at her baseline. She denies chest pain, palpitations, shortness of breath, dizziness, vertigo, chills, sensorimotor deficits, headache, speech difficulty, and visual disturbances. She specifically denies any areas of pain or limited range of motion. She reports having a history of heat induced syncope approximately 4 years ago, which presented similarly to today's episode. She admits to not having much water over the prior 2 days, despite being outside in the heat.     Past Medical History:   Diagnosis Date    Anxiety     Arthritis     Fatigue     History of bone density study 2015 neg    Leg swelling     Polio     SOB (shortness of breath)        Past Surgical History:   Procedure Laterality Date    BREAST SURGERY PROCEDURE UNLISTED  1975    HX ORTHOPAEDIC  1    NAVEEN BIOPSY BREAST STEREOTACTIC Right 1980    negative         Family History:   Problem Relation Age of Onset    Dementia Mother     Ovarian Cancer Mother 80    Heart Disease Father        Social History     Socioeconomic History    Marital status:      Spouse name: Not on file    Number of children: Not on file    Years of education: Not on file    Highest education level: Not on file   Occupational History    Not on file   Social Needs    Financial resource strain: Not on file    Food insecurity     Worry: Not on file     Inability: Not on file    Transportation needs     Medical: Not on file     Non-medical: Not on file   Tobacco Use    Smoking status: Never Smoker    Smokeless tobacco: Never Used   Substance and Sexual Activity    Alcohol use: No    Drug use: No    Sexual activity: Not Currently     Partners: Male     Birth control/protection: None   Lifestyle    Physical activity     Days per week: Not on file     Minutes per session: Not on file    Stress: Not on file   Relationships    Social connections     Talks on phone: Not on file     Gets together: Not on file     Attends Quaker service: Not on file     Active member of club or organization: Not on file     Attends meetings of clubs or organizations: Not on file     Relationship status: Not on file    Intimate partner violence     Fear of current or ex partner: Not on file     Emotionally abused: Not on file     Physically abused: Not on file     Forced sexual activity: Not on file   Other Topics Concern    Not on file   Social History Narrative    Not on file         ALLERGIES: Patient has no known allergies. Review of Systems   Constitutional: Positive for fatigue. Negative for fever. HENT: Negative for sore throat. Eyes: Negative for visual disturbance. Respiratory: Negative for cough and shortness of breath. Cardiovascular: Negative for chest pain, palpitations and leg swelling. Gastrointestinal: Negative for vomiting. Genitourinary: Negative for dysuria. Musculoskeletal: Negative for myalgias. Skin: Negative for rash. Neurological: Positive for syncope. Negative for dizziness, facial asymmetry, weakness, light-headedness and headaches. Psychiatric/Behavioral: The patient is nervous/anxious. Vitals:    07/11/20 1809 07/11/20 1821   BP: 124/56 117/60   Pulse: 75 75   Resp: 18    Temp: 97.8 °F (36.6 °C)    SpO2: 96%    Weight: 71.7 kg (158 lb)    Height: 5' 6\" (1.676 m)             Physical Exam  Vitals signs and nursing note reviewed. Constitutional:       General: She is not in acute distress. Appearance: Normal appearance. She is not ill-appearing. HENT:      Head: Normocephalic and atraumatic. Right Ear: External ear normal.      Left Ear: External ear normal.      Nose: Nose normal.      Mouth/Throat:      Mouth: Mucous membranes are dry. Pharynx: Oropharynx is clear. Eyes:      General: No scleral icterus. Extraocular Movements: Extraocular movements intact. Conjunctiva/sclera: Conjunctivae normal.      Pupils: Pupils are equal, round, and reactive to light. Neck:      Musculoskeletal: Normal range of motion and neck supple. No neck rigidity or muscular tenderness. Cardiovascular:      Rate and Rhythm: Normal rate and regular rhythm. Pulses: Normal pulses. Heart sounds: Normal heart sounds. Pulmonary:      Effort: Pulmonary effort is normal.      Breath sounds: Normal breath sounds. Abdominal:      General: Abdomen is flat. Bowel sounds are normal.      Palpations: Abdomen is soft. Musculoskeletal: Normal range of motion. Skin:     General: Skin is warm and dry. Coloration: Skin is not pale. Neurological:      General: No focal deficit present. Mental Status: She is alert and oriented to person, place, and time. Psychiatric:         Mood and Affect: Mood normal.         Behavior: Behavior normal.         Thought Content:  Thought content normal.         Judgment: Judgment normal.          MDM     VITAL SIGNS:  Patient Vitals for the past 4 hrs:   Temp Pulse Resp BP SpO2   07/11/20 1821  75  117/60    07/11/20 1809 97.8 °F (36.6 °C) 75 18 124/56 96 %         LABS:  Recent Results (from the past 6 hour(s))   CBC WITH AUTOMATED DIFF    Collection Time: 07/11/20  6:13 PM   Result Value Ref Range    WBC 5.5 3.6 - 11.0 K/uL    RBC 3.97 3.80 - 5.20 M/uL    HGB 12.7 11.5 - 16.0 g/dL    HCT 37.0 35.0 - 47.0 %    MCV 93.2 80.0 - 99.0 FL    MCH 32.0 26.0 - 34.0 PG    MCHC 34.3 30.0 - 36.5 g/dL    RDW 11.9 11.5 - 14.5 %    PLATELET 374 107 - 641 K/uL    MPV 9.8 8.9 - 12.9 FL    NRBC 0.0 0  WBC    ABSOLUTE NRBC 0.00 0.00 - 0.01 K/uL    NEUTROPHILS 56 32 - 75 %    LYMPHOCYTES 30 12 - 49 %    MONOCYTES 11 5 - 13 %    EOSINOPHILS 2 0 - 7 %    BASOPHILS 1 0 - 1 %    IMMATURE GRANULOCYTES 0 0.0 - 0.5 %    ABS. NEUTROPHILS 3.1 1.8 - 8.0 K/UL    ABS. LYMPHOCYTES 1.6 0.8 - 3.5 K/UL    ABS. MONOCYTES 0.6 0.0 - 1.0 K/UL    ABS. EOSINOPHILS 0.1 0.0 - 0.4 K/UL    ABS. BASOPHILS 0.0 0.0 - 0.1 K/UL    ABS. IMM. GRANS. 0.0 0.00 - 0.04 K/UL    DF AUTOMATED     METABOLIC PANEL, COMPREHENSIVE    Collection Time: 07/11/20  6:13 PM   Result Value Ref Range    Sodium 142 136 - 145 mmol/L    Potassium 3.6 3.5 - 5.1 mmol/L    Chloride 107 97 - 108 mmol/L    CO2 29 21 - 32 mmol/L    Anion gap 6 5 - 15 mmol/L    Glucose 98 65 - 100 mg/dL    BUN 17 6 - 20 MG/DL    Creatinine 0.89 0.55 - 1.02 MG/DL    BUN/Creatinine ratio 19 12 - 20      GFR est AA >60 >60 ml/min/1.73m2    GFR est non-AA >60 >60 ml/min/1.73m2    Calcium 8.6 8.5 - 10.1 MG/DL    Bilirubin, total 0.4 0.2 - 1.0 MG/DL    ALT (SGPT) 19 12 - 78 U/L    AST (SGOT) 15 15 - 37 U/L    Alk. phosphatase 49 45 - 117 U/L    Protein, total 6.1 (L) 6.4 - 8.2 g/dL    Albumin 3.3 (L) 3.5 - 5.0 g/dL    Globulin 2.8 2.0 - 4.0 g/dL    A-G Ratio 1.2 1.1 - 2.2     SAMPLES BEING HELD    Collection Time: 07/11/20  6:13 PM   Result Value Ref Range    SAMPLES BEING HELD 1SST,1RED,1BLUE     COMMENT        Add-on orders for these samples will be processed based on acceptable specimen integrity and analyte stability, which may vary by analyte.    TROPONIN I    Collection Time: 07/11/20  6:13 PM   Result Value Ref Range    Troponin-I, Qt. <0.05 <0.05 ng/mL   MAGNESIUM    Collection Time: 07/11/20  6:13 PM   Result Value Ref Range    Magnesium 1.9 1.6 - 2.4 mg/dL   URINALYSIS W/MICROSCOPIC    Collection Time: 07/11/20  7:38 PM   Result Value Ref Range    Color YELLOW/STRAW      Appearance CLEAR CLEAR      Specific gravity 1.010 1.003 - 1.030      pH (UA) 7.0 5.0 - 8.0      Protein Negative NEG mg/dL    Glucose Negative NEG mg/dL    Ketone Negative NEG mg/dL    Bilirubin Negative NEG      Blood Negative NEG      Urobilinogen 0.2 0.2 - 1.0 EU/dL    Nitrites Negative NEG      Leukocyte Esterase TRACE (A) NEG      WBC 0-4 0 - 4 /hpf    RBC 0-5 0 - 5 /hpf    Epithelial cells FEW FEW /lpf    Bacteria Negative NEG /hpf    Hyaline cast 2-5 0 - 5 /lpf   URINE CULTURE HOLD SAMPLE    Collection Time: 07/11/20  7:38 PM    Specimen: Serum; Urine   Result Value Ref Range    Urine culture hold        Urine on hold in Microbiology dept for 2 days. If unpreserved urine is submitted, it cannot be used for addtional testing after 24 hours, recollection will be required. IMAGING:  CT HEAD WO CONT   Final Result   IMPRESSION:    No acute process or change compared to the prior exam.                  Medications During Visit:  Medications   sodium chloride 0.9 % bolus infusion 1,000 mL (1,000 mL IntraVENous New Bag 7/11/20 1829)     + 1,000 ml of Normal Saline given PTA by EMS. DECISION MAKING:  Tank Acosta is a 75 y.o. female who comes in as above. EKG: normal EKG, normal sinus rhythm, unchanged from previous tracings. Ventricular rate at 75 bpm.  QRS duration 80 ms. Normal axis. No ischemic ST changes. 7:30 PM  Patient observed ambulating to and from the restroom with steady gait (at baseline) and no complaints of dizziness, weakness, or presyncope. Pt. Informed about normal CT scan results and updated on plan of care. ER work-up unremarkable for anemia, electrolyte abnormality, troponin elevation, UTI, or signs of marked dehydration.   CT scan of the head also within normal limits. I suspect the patient's syncope heat induced, with underlying dehydration. She appears to be completely back to her baseline after receiving 2 L of IV fluids. She has since then been able to tolerate fluids with no complaints of nausea. Option of admission for observation discussed with the patient and her son at this time. Both respectfully declined and felt that going home with prompt outpatient follow-up would be their choice, which I think is reasonable. Plan of care discussed with the patient and her son, which included following with her primary care doctor next week and calling her established cardiologist to try and move her appointment up from September to an earlier date. Patient was instructed to limit her time outside and to set reminders for drinking water/staying hydrated. Patient verbalized understanding the care plan, and her son stated that he would be staying with her tonight to ensure her safety. IMPRESSION:  1. Syncope and collapse        DISPOSITION:      Current Discharge Medication List           Follow-up Information     Follow up With Specialties Details Why Contact Info    William Dupree MD [facility]  As needed, post-ER follow up [place] [id number] [facility]       Mahsa Owens MD Cardiology Call Try to move appointment to a closer date [address] [place] [address] [address] [place] [address]  [phone]              The patient is asked to follow-up with their primary care provider in the next several days. They are to call tomorrow for an appointment. The patient is asked to return promptly for any increased concerns or worsening of symptoms. They can return to this emergency department or any other emergency department.

## 2020-07-11 NOTE — ED NOTES
Bedside and Verbal shift change report given to Mauricio Tamayo (oncoming nurse) by Sarthak Hoff (offgoing nurse). Report included the following information SBAR, ED Summary, MAR and Recent Results.

## 2020-07-13 LAB
ATRIAL RATE: 75 BPM
CALCULATED P AXIS, ECG09: 57 DEGREES
CALCULATED R AXIS, ECG10: 13 DEGREES
CALCULATED T AXIS, ECG11: 27 DEGREES
DIAGNOSIS, 93000: NORMAL
P-R INTERVAL, ECG05: 186 MS
Q-T INTERVAL, ECG07: 392 MS
QRS DURATION, ECG06: 80 MS
QTC CALCULATION (BEZET), ECG08: 437 MS
VENTRICULAR RATE, ECG03: 75 BPM

## 2020-07-21 NOTE — PROGRESS NOTES
LAST OFFICE VISIT : 10/6/2017       ATTENTION:   This medical record was transcribed using an electronic medical records/speech recognition system. Although proofread, it may and can contain electronic, spelling and other errors. Corrections may be executed at a later time. Please feel free to contact us for any clarifications as needed. ICD-10-CM ICD-9-CM   1. SOB (shortness of breath)  R06.02 786.05   2. Dyslipidemia (high LDL; low HDL)  E78.5 272.4   3. Syncope, unspecified syncope type  R55 780.2            Abdullahi Bonilla is a 75 y.o. female with dyslipidemia referred for 6 month follow up. Cardiac risk factors: family history, dyslipidemia, post-menopausal  I have personally obtained the history from the patient. Colten De Los Santos comes in with her son today. She has had a history of syncope and recently had another episode. She states the day before the syncopal episode she was out in the yard cutting the grass and working in the lawn and it was extremely hot and the following day she got up and had only a cup of coffee and a coke and then went to talk with the room for his regarding placement of a new roof and she was looking up for a while and when she looked up then suddenly she felt lightheaded all of her testing in the emergency room was normal with negative CT of her blood work was normal.  She was given fluids and her symptoms improved. Prior to the states she has not had any other syncopal episodes. She does not describe any dizziness with quick motions of her head. It was a witnessed syncopal episode that lasted less than a minute in duration with no associated tonic-clonic movements and no sensation of arrhythmias. She has some slight mild confusion that resolved quickly and she was given 750 cc of normal saline.            ACTIVE PROBLEM LIST     Patient Active Problem List    Diagnosis Date Noted    Urinary incontinence 02/18/2017    Syncope 02/17/2017           PAST MEDICAL HISTORY     Past Medical History:   Diagnosis Date    Anxiety     Arthritis     Fatigue     History of bone density study 2015 neg    Leg swelling     Polio     SOB (shortness of breath)            PAST SURGICAL HISTORY     Past Surgical History:   Procedure Laterality Date    BREAST SURGERY PROCEDURE UNLISTED  1975    HX ORTHOPAEDIC  1    NAVEEN BIOPSY BREAST STEREOTACTIC Right 1980    negative          ALLERGIES     No Known Allergies       FAMILY HISTORY     Family History   Problem Relation Age of Onset    Dementia Mother    [facility] Ovarian Cancer Mother 80    Heart Disease Father     negative for cardiac disease       SOCIAL HISTORY     Social History     Socioeconomic History    Marital status:      Spouse name: Not on file    Number of children: Not on file    Years of education: Not on file    Highest education level: Not on file   Tobacco Use    Smoking status: Never Smoker    Smokeless tobacco: Never Used   Substance and Sexual Activity    Alcohol use: No    Drug use: No    Sexual activity: Not Currently     Partners: Male     Birth control/protection: None         MEDICATIONS     Current Outpatient Medications   Medication Sig    cholecalciferol (VITAMIN D3) 1,000 unit cap Take  by mouth daily.  aspirin 81 mg chewable tablet Take 1 Tab by mouth daily.  pravastatin (PRAVACHOL) 40 mg tablet Take 1 Tab by mouth nightly.  glucosamine-chondroitin (ARTHX) 500-400 mg cap Take 2 Caps by mouth daily. No current facility-administered medications for this visit. I have reviewed the nurses notes, vitals, problem list, allergy list, medical history, family, social history and medications. REVIEW OF SYMPTOMS      General: Pt denies excessive weight gain or loss. Pt is able to conduct ADL's  HEENT: Denies blurred vision, headaches, hearing loss, epistaxis and difficulty swallowing.   Respiratory: Denies cough, congestion, shortness of breath, INFANTE, wheezing or stridor. Cardiovascular: Denies precordial pain, palpitations, edema or PND  Gastrointestinal: Denies poor appetite, indigestion, abdominal pain or blood in stool  Genitourinary: Denies hematuria, dysuria, increased urinary frequency  Musculoskeletal: Denies joint pain or swelling from muscles or joints  Neurologic: Denies tremor, paresthesias, headache, or sensory motor disturbance  Psychiatric: Denies confusion, insomnia, depression  Integumentray: Denies rash, itching or ulcers. Hematologic: Denies easy bruising, bleeding     PHYSICAL EXAMINATION      There were no vitals filed for this visit. General: Well developed, in no acute distress. HEENT: No jaundice, oral mucosa moist, no oral ulcers  Neck: Supple, no stiffness, no lymphadenopathy, supple  Heart: Regular rate and rhythm  Respiratory: Clear bilaterally x 4, no wheezing or rales  Extremities:  No edema, normal cap refill, no cyanosis. Musculoskeletal: No clubbing, no deformities  Neuro: A&Ox3, speech clear, gait stable, cooperative, no focal neurologic deficits  Skin: Skin color is normal. No rashes or lesions. Non diaphoretic, moist.        DIAGNOSTIC DATA     1. Echo  2/17/17- EF 60%    2. Lipids  2/19/17- , HDL 79, .8, TG 86  9/19/17- , HDL 79, LDL 44, TG 44  5/2/18 - , HDL 67, LDL 60, TG 82  9/14/18- , HDL 71, LDL 59, TG 94  5/9/19- , HDL 75, LDL 61, TG 57  5/19/20- , HDL 71, LDL 64, TG 62          3. Carotid Doppler  2/19/17- R 50-69%, L 0-49%  8/9/17- R 16-49%, L 1-15%  7/18/18- Jhonatan 16-49%  10/7/19- Jhonatan 16-49%    4. Myocardial perfusion study  5/8/17- (exercise stress) no ischemia    5.  Loop  2/23/17 - 3/24/17- NSR         LABORATORY DATA            Lab Results   Component Value Date/Time    WBC 5.5 07/11/2020 06:13 PM    HGB 12.7 07/11/2020 06:13 PM    HCT 37.0 07/11/2020 06:13 PM    PLATELET 196 51/80/9901 06:13 PM    MCV 93.2 07/11/2020 06:13 PM      Lab Results   Component Value Date/Time    Sodium 142 07/11/2020 06:13 PM    Potassium 3.6 07/11/2020 06:13 PM    Chloride 107 07/11/2020 06:13 PM    CO2 29 07/11/2020 06:13 PM    Anion gap 6 07/11/2020 06:13 PM    Glucose 98 07/11/2020 06:13 PM    BUN 17 07/11/2020 06:13 PM    Creatinine 0.89 07/11/2020 06:13 PM    BUN/Creatinine ratio 19 07/11/2020 06:13 PM    GFR est AA >60 07/11/2020 06:13 PM    GFR est non-AA >60 07/11/2020 06:13 PM    Calcium 8.6 07/11/2020 06:13 PM    Bilirubin, total 0.4 07/11/2020 06:13 PM    Alk. phosphatase 49 07/11/2020 06:13 PM    Protein, total 6.1 (L) 07/11/2020 06:13 PM    Albumin 3.3 (L) 07/11/2020 06:13 PM    Globulin 2.8 07/11/2020 06:13 PM    A-G Ratio 1.2 07/11/2020 06:13 PM    ALT (SGPT) 19 07/11/2020 06:13 PM           ASSESSMENT/RECOMMENDATIONS:.      1. Syncope  - Sounds as though she may have is a vasovagal episode or could have been related dehydration although her BUN and creatinine were normal on blood work. -Also sounds as though she is likely looking up for prolonged period of time so I think carotid Dopplers may be appropriate I do not hear any carotid bruits.  -We will also repeat an echocardiogram just to ensure that LV function is unchanged. - She is also followed by Dr. Thony Lennon. 2. Dyslipidemia  -Cholesterol profile is been excellent. 3. Return in 1 year or PRN. No orders of the defined types were placed in this encounter. I have discussed the diagnosis with  Joan Day and the intended plan as seen in the above orders. Questions were answered concerning future plans. I have discussed medication side effects and warnings with the patient as well. Thank you,  Jermaine Slade MD for involving me in the care of  Joan Day. Please do not hesitate to contact me for further questions/concerns. Vasile Vasquez MD, [facility]    Patient Care Team:  Jermaine Slade MD as PCP - General (Family Medicine)  Yani Gusman MD (Breast Surgery)  Roseline, Zoe Navarro MD (Cardiology)    [address], [address] [place] [address]     Lisha Alvarez 56      [phone] / [phone] Fax

## 2020-07-22 ENCOUNTER — OFFICE VISIT (OUTPATIENT)
Dept: CARDIOLOGY CLINIC | Age: 75
End: 2020-07-22

## 2020-07-22 VITALS
OXYGEN SATURATION: 96 % | SYSTOLIC BLOOD PRESSURE: 142 MMHG | DIASTOLIC BLOOD PRESSURE: 82 MMHG | WEIGHT: 162 LBS | HEART RATE: 88 BPM | HEIGHT: 66 IN | BODY MASS INDEX: 26.03 KG/M2

## 2020-07-22 DIAGNOSIS — E78.5 DYSLIPIDEMIA (HIGH LDL; LOW HDL): ICD-10-CM

## 2020-07-22 DIAGNOSIS — R55 SYNCOPE, UNSPECIFIED SYNCOPE TYPE: ICD-10-CM

## 2020-07-22 DIAGNOSIS — R06.02 SOB (SHORTNESS OF BREATH): Primary | ICD-10-CM

## 2020-07-22 NOTE — PROGRESS NOTES
Visit Vitals  /82 (BP 1 Location: Right arm, BP Patient Position: Standing)   Pulse 88   Ht 5' 6\" (1.676 m)   Wt 162 lb (73.5 kg)   SpO2 96%   BMI 26.15 kg/m²       Supine 126/82    Chest pain: no  Shortness of breath: no  Edema: no  Palpitations: no  Dizziness: no    New diagnosis/Surgeries: no    ER/Hospitalizations: 7- overheated and dehydrated, Syncope (fell but 2 ppl caught her)

## 2020-08-04 ENCOUNTER — ANCILLARY PROCEDURE (OUTPATIENT)
Dept: CARDIOLOGY CLINIC | Age: 75
End: 2020-08-04
Payer: MEDICARE

## 2020-08-04 VITALS
HEIGHT: 66 IN | DIASTOLIC BLOOD PRESSURE: 82 MMHG | WEIGHT: 162 LBS | SYSTOLIC BLOOD PRESSURE: 130 MMHG | BODY MASS INDEX: 26.03 KG/M2

## 2020-08-04 VITALS
SYSTOLIC BLOOD PRESSURE: 130 MMHG | BODY MASS INDEX: 26.03 KG/M2 | HEIGHT: 66 IN | DIASTOLIC BLOOD PRESSURE: 82 MMHG | WEIGHT: 162 LBS

## 2020-08-04 DIAGNOSIS — R06.02 SOB (SHORTNESS OF BREATH): ICD-10-CM

## 2020-08-04 DIAGNOSIS — I65.23 BILATERAL CAROTID ARTERY STENOSIS: ICD-10-CM

## 2020-08-04 DIAGNOSIS — R55 SYNCOPE, UNSPECIFIED SYNCOPE TYPE: ICD-10-CM

## 2020-08-04 LAB
ECHO AO ASC DIAM: 2.89 CM
ECHO AO ROOT DIAM: 3.28 CM
ECHO AV AREA PEAK VELOCITY: 2.82 CM2
ECHO AV AREA VTI: 3.19 CM2
ECHO AV AREA/BSA PEAK VELOCITY: 1.5 CM2/M2
ECHO AV AREA/BSA VTI: 1.7 CM2/M2
ECHO AV MEAN GRADIENT: 3.98 MMHG
ECHO AV PEAK GRADIENT: 7.98 MMHG
ECHO AV PEAK VELOCITY: 141.26 CM/S
ECHO AV VTI: 28.8 CM
ECHO LA AREA 4C: 9.64 CM2
ECHO LA MAJOR AXIS: 3.11 CM
ECHO LA MINOR AXIS: 1.7 CM
ECHO LA VOL 2C: 35.19 ML (ref 22–52)
ECHO LA VOL 4C: 18.47 ML (ref 22–52)
ECHO LA VOL BP: 27.42 ML (ref 22–52)
ECHO LA VOL/BSA BIPLANE: 14.99 ML/M2 (ref 16–28)
ECHO LA VOLUME INDEX A2C: 19.24 ML/M2 (ref 16–28)
ECHO LA VOLUME INDEX A4C: 10.1 ML/M2 (ref 16–28)
ECHO LV E' LATERAL VELOCITY: 8.46 CM/S
ECHO LV E' SEPTAL VELOCITY: 6.94 CM/S
ECHO LV EDV A2C: 81.55 ML
ECHO LV EDV A4C: 74.45 ML
ECHO LV EDV BP: 78.98 ML (ref 56–104)
ECHO LV EDV INDEX A4C: 40.7 ML/M2
ECHO LV EDV INDEX BP: 43.2 ML/M2
ECHO LV EDV NDEX A2C: 44.6 ML/M2
ECHO LV EJECTION FRACTION A2C: 65 PERCENT
ECHO LV EJECTION FRACTION A4C: 64 PERCENT
ECHO LV EJECTION FRACTION BIPLANE: 64.4 PERCENT (ref 55–100)
ECHO LV ESV A2C: 28.39 ML
ECHO LV ESV A4C: 27.13 ML
ECHO LV ESV BP: 28.09 ML (ref 19–49)
ECHO LV ESV INDEX A2C: 15.5 ML/M2
ECHO LV ESV INDEX A4C: 14.8 ML/M2
ECHO LV ESV INDEX BP: 15.4 ML/M2
ECHO LV GLOBAL LONGITUDINAL STRAIN (GLS): 22.3 PERCENT
ECHO LV INTERNAL DIMENSION DIASTOLIC: 4.57 CM (ref 3.9–5.3)
ECHO LV INTERNAL DIMENSION SYSTOLIC: 3.06 CM
ECHO LV IVSD: 0.74 CM (ref 0.6–0.9)
ECHO LV MASS 2D: 109.1 G (ref 67–162)
ECHO LV MASS INDEX 2D: 59.7 G/M2 (ref 43–95)
ECHO LV POSTERIOR WALL DIASTOLIC: 0.78 CM (ref 0.6–0.9)
ECHO LVOT DIAM: 2.16 CM
ECHO LVOT PEAK GRADIENT: 4.79 MMHG
ECHO LVOT PEAK VELOCITY: 109.45 CM/S
ECHO LVOT SV: 91.7 ML
ECHO LVOT VTI: 25.16 CM
ECHO MV A VELOCITY: 96.36 CM/S
ECHO MV E DECELERATION TIME (DT): 0.19 S
ECHO MV E VELOCITY: 76.66 CM/S
ECHO MV E/A RATIO: 0.8
ECHO MV E/E' LATERAL: 9.06
ECHO MV E/E' RATIO (AVERAGED): 10.05
ECHO MV E/E' SEPTAL: 11.05
ECHO MV PRESSURE HALF TIME (PHT): 0.06 S
ECHO RA AREA 4C: 7.56 CM2
ECHO RV TAPSE: 2.22 CM (ref 1.5–2)
GLOBAL LONGITUDINAL STRAIN 2 CHAMBER: 22.9 PERCENT
GLOBAL LONGITUDINAL STRAIN 4 CHAMBER: 22.8 PERCENT
GLOBAL LONGITUDINAL STRAIN LONG AXIS: 21.4 PERCENT
LA VOL DISK BP: 25.69 ML (ref 22–52)
LEFT CCA DIST DIAS: 25.5 CENTIMETER/SECOND
LEFT CCA DIST SYS: 94.4 CENTIMETER/SECOND
LEFT CCA PROX DIAS: 28.2 CENTIMETER/SECOND
LEFT CCA PROX SYS: 105 CENTIMETER/SECOND
LEFT ECA DIAS: 4.44 CENTIMETER/SECOND
LEFT ECA SYS: 69.5 CENTIMETER/SECOND
LEFT ICA DIST DIAS: 24.3 CENTIMETER/SECOND
LEFT ICA DIST SYS: 71.8 CENTIMETER/SECOND
LEFT ICA MID DIAS: 23.9 CENTIMETER/SECOND
LEFT ICA MID SYS: 107.1 CENTIMETER/SECOND
LEFT ICA PROX DIAS: 21.9 CENTIMETER/SECOND
LEFT ICA PROX SYS: 89.5 CENTIMETER/SECOND
LEFT ICA/CCA SYS: 1.02
LEFT VERTEBRAL DIAS: 13.63 CENTIMETER/SECOND
LEFT VERTEBRAL SYS: 64.2 CENTIMETER/SECOND
RIGHT CCA DIST DIAS: 25.7 CENTIMETER/SECOND
RIGHT CCA DIST SYS: 93.1 CENTIMETER/SECOND
RIGHT CCA PROX DIAS: 26.9 CENTIMETER/SECOND
RIGHT CCA PROX SYS: 113.9 CENTIMETER/SECOND
RIGHT ECA DIAS: 14.09 CENTIMETER/SECOND
RIGHT ECA SYS: 113.8 CENTIMETER/SECOND
RIGHT ICA DIST DIAS: 28.5 CENTIMETER/SECOND
RIGHT ICA DIST SYS: 85.6 CENTIMETER/SECOND
RIGHT ICA MID DIAS: 23.7 CENTIMETER/SECOND
RIGHT ICA MID SYS: 68.9 CENTIMETER/SECOND
RIGHT ICA PROX DIAS: 27 CENTIMETER/SECOND
RIGHT ICA PROX SYS: 71.3 CENTIMETER/SECOND
RIGHT ICA/CCA SYS: 0.8
RIGHT VERTEBRAL DIAS: 11.88 CENTIMETER/SECOND
RIGHT VERTEBRAL SYS: 44.8 CENTIMETER/SECOND

## 2020-08-04 PROCEDURE — 93880 EXTRACRANIAL BILAT STUDY: CPT | Performed by: INTERNAL MEDICINE

## 2020-08-04 PROCEDURE — 93306 TTE W/DOPPLER COMPLETE: CPT | Performed by: SPECIALIST

## 2020-09-03 VITALS
HEART RATE: 72 BPM | TEMPERATURE: 97.5 F | HEIGHT: 64 IN | OXYGEN SATURATION: 97 % | SYSTOLIC BLOOD PRESSURE: 120 MMHG | RESPIRATION RATE: 16 BRPM | BODY MASS INDEX: 27.53 KG/M2 | WEIGHT: 161.25 LBS | DIASTOLIC BLOOD PRESSURE: 78 MMHG

## 2020-09-03 PROBLEM — F41.1 GENERALIZED ANXIETY DISORDER: Status: ACTIVE | Noted: 2020-09-03

## 2020-09-03 PROBLEM — E66.3 OVERWEIGHT: Status: ACTIVE | Noted: 2020-09-03

## 2020-09-03 PROBLEM — E78.5 HYPERLIPIDEMIA: Status: ACTIVE | Noted: 2020-09-03

## 2020-09-03 PROBLEM — I65.29 CAROTID ARTERY STENOSIS: Status: ACTIVE | Noted: 2020-09-03

## 2020-09-03 PROBLEM — M85.80 OSTEOPENIA: Status: ACTIVE | Noted: 2020-09-03

## 2020-09-03 PROBLEM — E55.9 VITAMIN D DEFICIENCY: Status: ACTIVE | Noted: 2020-09-03

## 2020-09-03 PROBLEM — I48.91 ATRIAL FIBRILLATION (HCC): Status: ACTIVE | Noted: 2020-09-03

## 2020-09-03 RX ORDER — FLUTICASONE PROPIONATE 50 MCG
2 SPRAY, SUSPENSION (ML) NASAL DAILY
COMMUNITY
End: 2021-01-25

## 2020-09-03 RX ORDER — AMOXICILLIN AND CLAVULANATE POTASSIUM 875; 125 MG/1; MG/1
TABLET, FILM COATED ORAL EVERY 12 HOURS
COMMUNITY
End: 2021-01-25

## 2020-10-07 ENCOUNTER — TELEPHONE (OUTPATIENT)
Dept: CARDIOLOGY CLINIC | Age: 75
End: 2020-10-07

## 2020-10-07 NOTE — TELEPHONE ENCOUNTER
Patient would like to know Dr Deyanira Sosa opinion on going to an appointment with Dr Yony Bateman tomorrow.      Phone: [phone]

## 2020-10-12 ENCOUNTER — OFFICE VISIT (OUTPATIENT)
Dept: NEUROLOGY | Age: 75
End: 2020-10-12
Payer: MEDICARE

## 2020-10-12 VITALS
HEART RATE: 78 BPM | BODY MASS INDEX: 26.03 KG/M2 | RESPIRATION RATE: 14 BRPM | OXYGEN SATURATION: 98 % | HEIGHT: 66 IN | DIASTOLIC BLOOD PRESSURE: 78 MMHG | WEIGHT: 162 LBS | SYSTOLIC BLOOD PRESSURE: 126 MMHG | TEMPERATURE: 97.7 F

## 2020-10-12 DIAGNOSIS — I65.23 BILATERAL CAROTID ARTERY STENOSIS: Primary | ICD-10-CM

## 2020-10-12 PROCEDURE — 1101F PT FALLS ASSESS-DOCD LE1/YR: CPT | Performed by: PSYCHIATRY & NEUROLOGY

## 2020-10-12 PROCEDURE — G8400 PT W/DXA NO RESULTS DOC: HCPCS | Performed by: PSYCHIATRY & NEUROLOGY

## 2020-10-12 PROCEDURE — 1090F PRES/ABSN URINE INCON ASSESS: CPT | Performed by: PSYCHIATRY & NEUROLOGY

## 2020-10-12 PROCEDURE — 99213 OFFICE O/P EST LOW 20 MIN: CPT | Performed by: PSYCHIATRY & NEUROLOGY

## 2020-10-12 PROCEDURE — G8510 SCR DEP NEG, NO PLAN REQD: HCPCS | Performed by: PSYCHIATRY & NEUROLOGY

## 2020-10-12 PROCEDURE — G8536 NO DOC ELDER MAL SCRN: HCPCS | Performed by: PSYCHIATRY & NEUROLOGY

## 2020-10-12 PROCEDURE — 3017F COLORECTAL CA SCREEN DOC REV: CPT | Performed by: PSYCHIATRY & NEUROLOGY

## 2020-10-12 PROCEDURE — G8419 CALC BMI OUT NRM PARAM NOF/U: HCPCS | Performed by: PSYCHIATRY & NEUROLOGY

## 2020-10-12 PROCEDURE — G8427 DOCREV CUR MEDS BY ELIG CLIN: HCPCS | Performed by: PSYCHIATRY & NEUROLOGY

## 2020-10-12 NOTE — LETTER
10/12/20 Patient: Leslie Moore YOB: 1945 Date of Visit: 10/12/2020 Lizabeth Graham MD 
[address] [place] [id number] [place] [address] VIA In Basket Dear Lizabeth Graham MD, Thank you for referring Ms. Waldo Murillo to [facility] for evaluation. My notes for this consultation are attached. If you have questions, please do not hesitate to call me. I look forward to following your patient along with you. Sincerely, Addison Dominguez MD

## 2020-10-12 NOTE — PROGRESS NOTES
[facility] and [address] at [facility] at [address] [facility], [address] [facility] [address] [facility], [address] [place] [address]   [phone]              Chief Complaint   Patient presents with    Follow-up     dop done w/ cardiology on 8/4/20     Current Outpatient Medications   Medication Sig Dispense Refill    amoxicillin-clavulanate (AUGMENTIN) 875-125 mg per tablet Take  by mouth every twelve (12) hours.  fluticasone propionate (FLONASE) 50 mcg/actuation nasal spray 2 Sprays by Both Nostrils route daily.  glucosam/chond/glycosaminog/C (JJCMSNWF-BEEYEETCIO-EW GLYCN-C PO) Take  by mouth.  IPRATROPIUM BROMIDE NA by Nasal route.  cholecalciferol (VITAMIN D3) 1,000 unit cap Take  by mouth daily.  aspirin 81 mg chewable tablet Take 1 Tab by mouth daily. 30 Tab 0    pravastatin (PRAVACHOL) 40 mg tablet Take 1 Tab by mouth nightly. 30 Tab 0    glucosamine-chondroitin (ARTHX) 500-400 mg cap Take 2 Caps by mouth daily. Allergies   Allergen Reactions    E-Mycin [Erythromycin] Nausea Only    Sertraline Other (comments)     Severe nervousness/shaky/jittery      Social History     Tobacco Use    Smoking status: Never Smoker    Smokeless tobacco: Never Used   Substance Use Topics    Alcohol use: No    Drug use: No     26-year-old lady returns today for follow-up. Her last visit with me was around this time last year for her annual visit for carotid stenosis. Her last Doppler was August 4 of this year demonstrating 16-49% bilaterally. This is consistent with her previous. She maintains her Pravachol. She has not had any stroke symptoms. She has had good blood pressures. No sign or symptom of cerebrovascular ischemia. Did have a syncopal spell over the summer where she worked out in the yard Friday and Saturday and really did not drink anything.   She went to the emergency room.  Everything was fine. She saw Dr. Helga Polanco and everything was fine    Examination  Visit Vitals  /78 (BP 1 Location: Left arm, BP Patient Position: Sitting)   Pulse 78   Temp 97.7 °F (36.5 °C) (Oral)   Resp 14   Ht 5' 6\" (1.676 m)   Wt 73.5 kg (162 lb)   SpO2 98%   BMI 26.15 kg/m²     Pleasant lady. Awake alert oriented and conversant. Normal speech and language. Steady gait. Cranial nerves intact 2-12. No nystagmus. No pronation or drift. No abnormal movement. Her strength is full in all muscle groups in the upper and lower extremities bilaterally. Reflexes symmetrical.  No pathologic reflex. No ataxia or gait steady    Impression/Plan  Carotid stenosis with no change and at this point we will schedule her back in a year to get her Doppler done in the morning and then see me afterwards. The [place] office is much closer to her. We will try to have that arranged both the Doppler and my appointment there, but that schedules not open yet for this time of year in 2021. Syncopal episode related to dehydration--she is maintaining adequate hydration at this point    Follow-up as above    Sofia Randhawa MD      This note was created using voice recognition software. Despite editing, there may be syntax errors. This note will not be viewable in [address]

## 2020-10-16 ENCOUNTER — TELEPHONE (OUTPATIENT)
Dept: FAMILY MEDICINE CLINIC | Age: 75
End: 2020-10-16

## 2020-10-16 NOTE — TELEPHONE ENCOUNTER
Ms Lluvia Akhtar was on the [facility] for 11/4/2020 for her MAW that we're having to reschedule. She states she doesn't feel she needs this wellness right now. She's seen Dr. Kristen Castro couple months ago and he did lots of lab work. Thought we could look that up since he's in the [place]. She said she's also see Dr Braden Myles recently. Stated the only issue is her refills will be due end of November. Wants to know if that would be a problem getting them refilled? She really doesn't want to come in office at her age unless it's absolutely necessary.  Please let the patient know

## 2020-10-18 NOTE — TELEPHONE ENCOUNTER
Ideal solution would be VV but if she is unable to perform virtual, ok to push appt back to early 2021.

## 2020-10-26 ENCOUNTER — HOSPITAL ENCOUNTER (OUTPATIENT)
Dept: MAMMOGRAPHY | Age: 75
Discharge: HOME | End: 2020-10-26
Attending: FAMILY MEDICINE
Payer: MEDICARE

## 2020-10-26 DIAGNOSIS — Z12.31 VISIT FOR SCREENING MAMMOGRAM: ICD-10-CM

## 2020-10-26 PROCEDURE — 77063 BREAST TOMOSYNTHESIS BI: CPT

## 2020-11-02 ENCOUNTER — TRANSCRIBE ORDER (OUTPATIENT)
Dept: SCHEDULING | Age: 75
End: 2020-11-02

## 2020-11-02 DIAGNOSIS — Z12.31 VISIT FOR SCREENING MAMMOGRAM: Primary | ICD-10-CM

## 2020-12-05 RX ORDER — PRAVASTATIN SODIUM 40 MG/1
TABLET ORAL
Qty: 90 TAB | Refills: 0 | Status: SHIPPED | OUTPATIENT
Start: 2020-12-05 | End: 2021-01-25 | Stop reason: SDUPTHER

## 2021-01-25 ENCOUNTER — OFFICE VISIT (OUTPATIENT)
Dept: FAMILY MEDICINE CLINIC | Age: 76
End: 2021-01-25
Payer: MEDICARE

## 2021-01-25 ENCOUNTER — TELEPHONE (OUTPATIENT)
Dept: FAMILY MEDICINE CLINIC | Age: 76
End: 2021-01-25

## 2021-01-25 VITALS
DIASTOLIC BLOOD PRESSURE: 92 MMHG | OXYGEN SATURATION: 96 % | WEIGHT: 167.2 LBS | RESPIRATION RATE: 12 BRPM | BODY MASS INDEX: 27.86 KG/M2 | HEIGHT: 65 IN | HEART RATE: 80 BPM | TEMPERATURE: 96.4 F | SYSTOLIC BLOOD PRESSURE: 120 MMHG

## 2021-01-25 DIAGNOSIS — Z12.31 VISIT FOR SCREENING MAMMOGRAM: ICD-10-CM

## 2021-01-25 DIAGNOSIS — Z13.39 ALCOHOL SCREENING: ICD-10-CM

## 2021-01-25 DIAGNOSIS — E55.9 VITAMIN D DEFICIENCY: ICD-10-CM

## 2021-01-25 DIAGNOSIS — E66.3 OVERWEIGHT WITH BODY MASS INDEX (BMI) OF 27 TO 27.9 IN ADULT: ICD-10-CM

## 2021-01-25 DIAGNOSIS — E78.00 PURE HYPERCHOLESTEROLEMIA: ICD-10-CM

## 2021-01-25 DIAGNOSIS — E78.5 DYSLIPIDEMIA (HIGH LDL; LOW HDL): ICD-10-CM

## 2021-01-25 DIAGNOSIS — I48.0 PAROXYSMAL ATRIAL FIBRILLATION (HCC): ICD-10-CM

## 2021-01-25 DIAGNOSIS — Z13.31 DEPRESSION SCREENING: ICD-10-CM

## 2021-01-25 DIAGNOSIS — Z78.0 POSTMENOPAUSAL STATE: ICD-10-CM

## 2021-01-25 DIAGNOSIS — E78.00 PURE HYPERCHOLESTEROLEMIA: Primary | ICD-10-CM

## 2021-01-25 DIAGNOSIS — Z00.00 WELLNESS EXAMINATION: Primary | ICD-10-CM

## 2021-01-25 DIAGNOSIS — Z23 ENCOUNTER FOR IMMUNIZATION: ICD-10-CM

## 2021-01-25 PROCEDURE — 1090F PRES/ABSN URINE INCON ASSESS: CPT | Performed by: FAMILY MEDICINE

## 2021-01-25 PROCEDURE — 1101F PT FALLS ASSESS-DOCD LE1/YR: CPT | Performed by: FAMILY MEDICINE

## 2021-01-25 PROCEDURE — G8536 NO DOC ELDER MAL SCRN: HCPCS | Performed by: FAMILY MEDICINE

## 2021-01-25 PROCEDURE — 99214 OFFICE O/P EST MOD 30 MIN: CPT | Performed by: FAMILY MEDICINE

## 2021-01-25 PROCEDURE — G0439 PPPS, SUBSEQ VISIT: HCPCS | Performed by: FAMILY MEDICINE

## 2021-01-25 PROCEDURE — 90732 PPSV23 VACC 2 YRS+ SUBQ/IM: CPT | Performed by: FAMILY MEDICINE

## 2021-01-25 PROCEDURE — G8427 DOCREV CUR MEDS BY ELIG CLIN: HCPCS | Performed by: FAMILY MEDICINE

## 2021-01-25 PROCEDURE — G0009 ADMIN PNEUMOCOCCAL VACCINE: HCPCS | Performed by: FAMILY MEDICINE

## 2021-01-25 PROCEDURE — G8419 CALC BMI OUT NRM PARAM NOF/U: HCPCS | Performed by: FAMILY MEDICINE

## 2021-01-25 PROCEDURE — G8400 PT W/DXA NO RESULTS DOC: HCPCS | Performed by: FAMILY MEDICINE

## 2021-01-25 PROCEDURE — G8432 DEP SCR NOT DOC, RNG: HCPCS | Performed by: FAMILY MEDICINE

## 2021-01-25 RX ORDER — ZOSTER VACCINE RECOMBINANT, ADJUVANTED 50 MCG/0.5
0.5 KIT INTRAMUSCULAR ONCE
Qty: 0.5 ML | Refills: 1 | Status: SHIPPED | OUTPATIENT
Start: 2021-01-25 | End: 2021-01-25

## 2021-01-25 RX ORDER — TETANUS TOXOID, REDUCED DIPHTHERIA TOXOID AND ACELLULAR PERTUSSIS VACCINE, ADSORBED 5; 2.5; 8; 8; 2.5 [IU]/.5ML; [IU]/.5ML; UG/.5ML; UG/.5ML; UG/.5ML
0.5 SUSPENSION INTRAMUSCULAR ONCE
Qty: 0.5 ML | Refills: 0 | Status: SHIPPED | OUTPATIENT
Start: 2021-01-25 | End: 2021-01-25

## 2021-01-25 RX ORDER — PRAVASTATIN SODIUM 40 MG/1
TABLET ORAL
Qty: 90 TAB | Refills: 3 | Status: SHIPPED | OUTPATIENT
Start: 2021-01-25 | End: 2021-01-25

## 2021-01-25 RX ORDER — PRAVASTATIN SODIUM 40 MG/1
TABLET ORAL
Qty: 90 TAB | Refills: 3 | Status: SHIPPED | OUTPATIENT
Start: 2021-01-25 | End: 2021-11-05

## 2021-01-25 NOTE — TELEPHONE ENCOUNTER
She failed to notify Dr. Jerson King that she needed her pravastatin refilled. She'd like it sent to her mail order pharmacy [facility] please.

## 2021-01-25 NOTE — PROGRESS NOTES
Medicare Wellness Exam:    Chief Complaint   Patient presents with    Annual Wellness Visit    Labs     she is a 67y.o. year old female who presents for evaluation for their Medicare Wellness Visit. She is also following up with a history of hyperlipidemia and vitamin D deficiency. She follows with her cardiologist for the history of A. fib. She reports taking medications as directed. She does admit that she has been more sedentary since the on set of COVID-19 restrictions. She is interested in getting vaccinated when possible. Fall Screen is completed and assessed=yes. Depression Screen is completed and assessed=yes  Medication list reviewed and adjusted for accuracy=yes  Immunizations reviewed and updated=yes  Health/Preventative Screenings reviewed and updated=yes  ADL Functions reviewed=yes  MiniCog Score= 5/5 (2 points for clock and 3/3 points for recall)   See scanned medicare wellness documents for full details. Patient Active Problem List    Diagnosis    Atrial fibrillation ([facility] [address]    Carotid artery stenosis    Generalized anxiety disorder    Pure hypercholesterolemia    Osteopenia    Overweight with body mass index (BMI) of 27 to 27.9 in adult    Vitamin D deficiency    Urinary incontinence    Syncope       Reviewed PmHx, RxHx, FmHx, SocHx, AllgHx and updated and dated in the chart. Review of Systems   Constitutional: Negative for chills, fever and malaise/fatigue. Respiratory: Negative for cough, shortness of breath and wheezing. Cardiovascular: Positive for leg swelling (no changes. h/o LLE edema due to polio). Negative for chest pain and palpitations. Gastrointestinal: Negative for diarrhea and vomiting. Genitourinary: Negative for dysuria. Musculoskeletal: Negative for falls. Skin: Negative for rash. Neurological: Negative for dizziness, tingling and weakness. Psychiatric/Behavioral: Negative for depression. The patient is not nervous/anxious. Objective:     Vitals:    01/25/21 0734   BP: (!) 120/92   Pulse: 80   Resp: 12   Temp: (!) 96.4 °F (35.8 °C)   TempSrc: Temporal   SpO2: 96%   Weight: 167 lb 3.2 oz (75.8 kg)   Height: 5' 5\" (1.651 m)     Physical Exam  Constitutional:       General: She is not in acute distress. Appearance: Normal appearance. She is normal weight. HENT:      Head: Normocephalic and atraumatic. Neck:      Musculoskeletal: Neck supple. No muscular tenderness. Thyroid: No thyroid mass or thyromegaly. Cardiovascular:      Rate and Rhythm: Normal rate and regular rhythm. Heart sounds: No murmur. Pulmonary:      Effort: Pulmonary effort is normal. No respiratory distress. Breath sounds: Normal breath sounds. No wheezing. Musculoskeletal:      Right lower leg: No edema. Left lower leg: No edema. Lymphadenopathy:      Cervical: No cervical adenopathy. Skin:     General: Skin is warm and dry. Neurological:      Mental Status: She is alert and oriented to person, place, and time. Mental status is at baseline. Psychiatric:         Attention and Perception: Attention and perception normal.         Mood and Affect: Mood and affect normal.         Speech: Speech normal.         Behavior: Behavior normal.          Assessment/ Plan:   Diagnoses and all orders for this visit:    1. Wellness examination  We are getting patient up to date on recommended preventative measures as noted. 2. Alcohol screening    3. Depression screening    4. Encounter for immunization  -     PNEUMOCOCCAL POLYSACCHARIDE VACCINE, 23-VALENT, ADULT OR IMMUNOSUPPRESSED PT DOSE,  Patient instructed to go to pharmacy for Shingrix series and Tdap. 5. Postmenopausal state  -     DEXA BONE DENSITY STUDY AXIAL; Future    6. Pure hypercholesterolemia  -     LIPID PANEL  -     METABOLIC PANEL, COMPREHENSIVE  -     CBC WITH AUTOMATED DIFF  Patient reports taking medication daily as directed.   We are checking annual level with labs.    7. Overweight with body mass index (BMI) of 27 to 27.9 in adult    8. Vitamin D deficiency  -     VITAMIN D, 25 HYDROXY  Patient reports taking 1000 unit supplement daily. We are check level with labs to ensure it is at goal on this dosing. 9. Paroxysmal atrial fibrillation [facility])  Following with cardiology. Other orders  -     varicella-zoster recombinant, PF, (Shingrix, PF,) 50 mcg/0.5 mL susr injection; 0.5 mL by IntraMUSCular route once for 1 dose. 0.5ml IM. Repeat in -6 mos. -     diphth,pertus,acell,,tetanus (Boostrix Tdap) 2.5-8-5 Lf-mcg-Lf/0.5mL susp suspension; 0.5 mL by IntraMUSCular route once for 1 dose.         -Pain evaluation performed today  -Cognitive Screen performed today  -Depression Screen, Fall risks (by up and go test)  and ADL functionality were addressed  -Medication list updated and reviewed for any changes   -A comprehensive review of medical issues and a plan was formulated  -End of life planning was addressed with pt   -Health Screenings for preventions were addressed and a plan was formulated  -Discussed with patient cancer risk factors and appropriate screenings for age  -Labs from previous visits were discussed with patient   -Discussed with patient diet and exercise and formulated a plan as needed  -An Advanced care plan was developed with the patient.  -Alcohol screening performed    -  Follow-up and Dispositions    · Return in about 1 year (around 1/25/2022) for [address], annual fasting. I have discussed the diagnosis with the patient and the intended plan as seen in the above orders. The patient understands and agrees with the plan. The patient has received an after-visit summary and questions were answered concerning future plans.      Medication Side Effects and Warnings were discussed with patien  Patient Labs were reviewed and or requested  Patient Past Records were reviewed and or requested    There are no Patient Instructions on file for this visit.      Kylie Laird MD

## 2021-01-26 ENCOUNTER — IMMUNIZATION (OUTPATIENT)
Dept: FAMILY MEDICINE CLINIC | Age: 76
End: 2021-01-26
Payer: MEDICARE

## 2021-01-26 DIAGNOSIS — Z23 ENCOUNTER FOR IMMUNIZATION: Primary | ICD-10-CM

## 2021-01-26 LAB
25(OH)D3+25(OH)D2 SERPL-MCNC: 49.1 NG/ML (ref 30–100)
ALBUMIN SERPL-MCNC: 4.3 G/DL (ref 3.7–4.7)
ALBUMIN/GLOB SERPL: 2.5 {RATIO} (ref 1.2–2.2)
ALP SERPL-CCNC: 55 IU/L (ref 39–117)
ALT SERPL-CCNC: 11 IU/L (ref 0–32)
AST SERPL-CCNC: 16 IU/L (ref 0–40)
BASOPHILS # BLD AUTO: 0 X10E3/UL (ref 0–0.2)
BASOPHILS NFR BLD AUTO: 1 %
BILIRUB SERPL-MCNC: 0.4 MG/DL (ref 0–1.2)
BUN SERPL-MCNC: 8 MG/DL (ref 8–27)
BUN/CREAT SERPL: 10 (ref 12–28)
CALCIUM SERPL-MCNC: 9.2 MG/DL (ref 8.7–10.3)
CHLORIDE SERPL-SCNC: 104 MMOL/L (ref 96–106)
CHOLEST SERPL-MCNC: 150 MG/DL (ref 100–199)
CO2 SERPL-SCNC: 27 MMOL/L (ref 20–29)
CREAT SERPL-MCNC: 0.81 MG/DL (ref 0.57–1)
EOSINOPHIL # BLD AUTO: 0.1 X10E3/UL (ref 0–0.4)
EOSINOPHIL NFR BLD AUTO: 2 %
ERYTHROCYTE [DISTWIDTH] IN BLOOD BY AUTOMATED COUNT: 12.3 % (ref 11.7–15.4)
GLOBULIN SER CALC-MCNC: 1.7 G/DL (ref 1.5–4.5)
GLUCOSE SERPL-MCNC: 93 MG/DL (ref 65–99)
HCT VFR BLD AUTO: 41.7 % (ref 34–46.6)
HDLC SERPL-MCNC: 78 MG/DL
HGB BLD-MCNC: 14.5 G/DL (ref 11.1–15.9)
IMM GRANULOCYTES # BLD AUTO: 0 X10E3/UL (ref 0–0.1)
IMM GRANULOCYTES NFR BLD AUTO: 0 %
LDLC SERPL CALC-MCNC: 59 MG/DL (ref 0–99)
LYMPHOCYTES # BLD AUTO: 1.4 X10E3/UL (ref 0.7–3.1)
LYMPHOCYTES NFR BLD AUTO: 32 %
MCH RBC QN AUTO: 31.7 PG (ref 26.6–33)
MCHC RBC AUTO-ENTMCNC: 34.8 G/DL (ref 31.5–35.7)
MCV RBC AUTO: 91 FL (ref 79–97)
MONOCYTES # BLD AUTO: 0.5 X10E3/UL (ref 0.1–0.9)
MONOCYTES NFR BLD AUTO: 12 %
NEUTROPHILS # BLD AUTO: 2.4 X10E3/UL (ref 1.4–7)
NEUTROPHILS NFR BLD AUTO: 53 %
PLATELET # BLD AUTO: 285 X10E3/UL (ref 150–450)
POTASSIUM SERPL-SCNC: 4.4 MMOL/L (ref 3.5–5.2)
PROT SERPL-MCNC: 6 G/DL (ref 6–8.5)
RBC # BLD AUTO: 4.57 X10E6/UL (ref 3.77–5.28)
SODIUM SERPL-SCNC: 145 MMOL/L (ref 134–144)
TRIGL SERPL-MCNC: 63 MG/DL (ref 0–149)
VLDLC SERPL CALC-MCNC: 13 MG/DL (ref 5–40)
WBC # BLD AUTO: 4.5 X10E3/UL (ref 3.4–10.8)

## 2021-01-26 PROCEDURE — 91301 COVID-19, MRNA, LNP-S, PF, 100MCG/0.5ML DOSE(MODERNA): CPT | Performed by: FAMILY MEDICINE

## 2021-02-24 ENCOUNTER — IMMUNIZATION (OUTPATIENT)
Dept: FAMILY MEDICINE CLINIC | Age: 76
End: 2021-02-24
Payer: MEDICARE

## 2021-02-24 DIAGNOSIS — Z23 ENCOUNTER FOR IMMUNIZATION: Primary | ICD-10-CM

## 2021-02-24 PROCEDURE — 91301 COVID-19, MRNA, LNP-S, PF, 100MCG/0.5ML DOSE(MODERNA): CPT | Performed by: FAMILY MEDICINE

## 2021-02-24 PROCEDURE — 0012A COVID-19, MRNA, LNP-S, PF, 100MCG/0.5ML DOSE(MODERNA): CPT | Performed by: FAMILY MEDICINE

## 2021-04-26 ENCOUNTER — OFFICE VISIT (OUTPATIENT)
Dept: CARDIOLOGY CLINIC | Age: 76
End: 2021-04-26
Payer: MEDICARE

## 2021-04-26 VITALS — SYSTOLIC BLOOD PRESSURE: 145 MMHG | HEART RATE: 80 BPM | DIASTOLIC BLOOD PRESSURE: 80 MMHG

## 2021-04-26 DIAGNOSIS — R55 SYNCOPE, UNSPECIFIED SYNCOPE TYPE: ICD-10-CM

## 2021-04-26 DIAGNOSIS — I65.23 BILATERAL CAROTID ARTERY STENOSIS: ICD-10-CM

## 2021-04-26 DIAGNOSIS — R06.02 SOB (SHORTNESS OF BREATH): Primary | ICD-10-CM

## 2021-04-26 DIAGNOSIS — E78.5 DYSLIPIDEMIA (HIGH LDL; LOW HDL): ICD-10-CM

## 2021-04-26 PROCEDURE — 1101F PT FALLS ASSESS-DOCD LE1/YR: CPT | Performed by: SPECIALIST

## 2021-04-26 PROCEDURE — G8419 CALC BMI OUT NRM PARAM NOF/U: HCPCS | Performed by: SPECIALIST

## 2021-04-26 PROCEDURE — G8432 DEP SCR NOT DOC, RNG: HCPCS | Performed by: SPECIALIST

## 2021-04-26 PROCEDURE — G8536 NO DOC ELDER MAL SCRN: HCPCS | Performed by: SPECIALIST

## 2021-04-26 PROCEDURE — G8427 DOCREV CUR MEDS BY ELIG CLIN: HCPCS | Performed by: SPECIALIST

## 2021-04-26 PROCEDURE — G0463 HOSPITAL OUTPT CLINIC VISIT: HCPCS | Performed by: SPECIALIST

## 2021-04-26 PROCEDURE — 99214 OFFICE O/P EST MOD 30 MIN: CPT | Performed by: SPECIALIST

## 2021-04-26 PROCEDURE — 1090F PRES/ABSN URINE INCON ASSESS: CPT | Performed by: SPECIALIST

## 2021-04-26 PROCEDURE — G8400 PT W/DXA NO RESULTS DOC: HCPCS | Performed by: SPECIALIST

## 2021-04-26 NOTE — PROGRESS NOTES
ATTENTION:   This medical record was transcribed using an electronic medical records/speech recognition system. Although proofread, it may and can contain electronic, spelling and other errors. Corrections may be executed at a later time. Please feel free to contact us for any clarifications as needed. ICD-10-CM ICD-9-CM   1. SOB (shortness of breath)  R06.02 786.05   2. Bilateral carotid artery stenosis  I65.23 433.10     433.30   3. Dyslipidemia (high LDL; low HDL)  E78.5 272.4   4. Syncope, unspecified syncope type  R55 780.2            Addie Toribio is a 67 y.o. female with dyslipidemia referred for 6 month follow up. Cardiac risk factors: family history, dyslipidemia, post-menopausal  I have personally obtained the history from the patient. Edith Moreno     Ms. De Los Santos comes in with her son today. She has had a history of syncope and recently had another episode. She states the day before the syncopal episode she was out in the yard cutting the grass and working in the lawn and it was extremely hot and the following day she got up and had only a cup of coffee and a coke and then went to talk with the room for his regarding placement of a new roof and she was looking up for a while and when she looked up then suddenly she felt lightheaded all of her testing in the emergency room was normal with negative CT of her blood work was normal.  She was given fluids and her symptoms improved. Prior to the states she has not had any other syncopal episodes. She does not describe any dizziness with quick motions of her head. It was a witnessed syncopal episode that lasted less than a minute in duration with no associated tonic-clonic movements and no sensation of arrhythmias. She has some slight mild confusion that resolved quickly and she was given 750 cc of normal saline.            ACTIVE PROBLEM LIST     Patient Active Problem List    Diagnosis Date Noted    Atrial fibrillation ([facility] [address] 09/03/2020    Carotid artery stenosis 09/03/2020    Generalized anxiety disorder 09/03/2020    Pure hypercholesterolemia 09/03/2020    Osteopenia 09/03/2020    Overweight with body mass index (BMI) of 27 to 27.9 in adult 09/03/2020    Vitamin D deficiency 09/03/2020    Urinary incontinence 02/18/2017    Syncope 02/17/2017           PAST MEDICAL HISTORY     Past Medical History:   Diagnosis Date    Anxiety     Arrhythmia     possible AFIB while in hospital 2017    Arthritis     CAD (coronary artery disease)     carotid blockage     Fatigue     History of bone density study 2015 neg    Leg swelling     Low vitamin D level     Osteopenia     Polio     SOB (shortness of breath)            PAST SURGICAL HISTORY     Past Surgical History:   Procedure Laterality Date    HX BREAST LUMPECTOMY      HX COLONOSCOPY  03/28/2008    HX ORTHOPAEDIC  1978    NAVEEN BIOPSY BREAST STEREOTACTIC Right 1980    negative    OH BREAST SURGERY PROCEDURE UNLISTED  1975          ALLERGIES     Allergies   Allergen Reactions    E-Mycin [Erythromycin] Nausea Only    Sertraline Other (comments)     Severe nervousness/shaky/jittery           FAMILY HISTORY     Family History   Problem Relation Age of Onset    Dementia Mother     Ovarian Cancer Mother 80    Diabetes Mother     Heart Disease Father     Heart Attack Father     Cancer Paternal Uncle     negative for cardiac disease       SOCIAL HISTORY     Social History     Socioeconomic History    Marital status:      Spouse name: Not on file    Number of children: Not on file    Years of education: Not on file    Highest education level: Not on file   Tobacco Use    Smoking status: Never Smoker    Smokeless tobacco: Never Used   Substance and Sexual Activity    Alcohol use: No    Drug use: No    Sexual activity: Not Currently     Partners: Male     Birth control/protection: None         MEDICATIONS     Current Outpatient Medications Medication Sig    pravastatin (PRAVACHOL) 40 mg tablet TAKE 1 TABLET AT BEDTIME    cholecalciferol (VITAMIN D3) 1,000 unit cap Take  by mouth daily.  aspirin 81 mg chewable tablet Take 1 Tab by mouth daily. No current facility-administered medications for this visit. I have reviewed the nurses notes, vitals, problem list, allergy list, medical history, family, social history and medications. REVIEW OF SYMPTOMS   Pertinent positive per HPI  General: Pt denies excessive weight gain or loss. Pt is able to conduct ADL's  HEENT: Denies blurred vision, headaches, hearing loss, epistaxis and difficulty swallowing. Respiratory: Denies cough, congestion, shortness of breath, INFANTE, wheezing or stridor. Cardiovascular: Denies precordial pain, palpitations, edema or PND  Gastrointestinal: Denies poor appetite, indigestion, abdominal pain or blood in stool  Genitourinary: Denies hematuria, dysuria, increased urinary frequency  Musculoskeletal: Denies joint pain or swelling from muscles or joints  Neurologic: Denies tremor, paresthesias, headache, or sensory motor disturbance  Psychiatric: Denies confusion, insomnia, depression  Integumentray: Denies rash, itching or ulcers. Hematologic: Denies easy bruising, bleeding     PHYSICAL EXAMINATION      Vitals:    04/26/21 1125   BP: (!) 145/80   Pulse: 80   Weight: (P) 164 lb 3.2 oz (74.5 kg)   Height: (P) 5' 5\" (1.651 m)     General: Well developed, in no acute distress. HEENT: No jaundice, oral mucosa moist, no oral ulcers  Neck: Supple, no stiffness, no lymphadenopathy, supple  Heart: Regular rate and rhythm  Respiratory: Clear bilaterally x 4, no wheezing or rales  Extremities:  No edema, normal cap refill, no cyanosis. Musculoskeletal: No clubbing, no deformities  Neuro: A&Ox3, speech clear, gait stable, cooperative, no focal neurologic deficits        DIAGNOSTIC DATA     1. Echo   2/17/17- EF 60%   8/4/20-EF 64%.      2. Lipids   2/19/17- , HDL 79, .8, TG 86   9/19/17- , HDL 79, LDL 44, TG 44   5/2/18 - , HDL 67, LDL 60, TG 82   9/14/18- , HDL 71, LDL 59, TG 94   5/9/19- , HDL 75, LDL 61, TG 57   5/19/20- , HDL 71, LDL 64, TG 62   1/25/21- , HDL 78, LDL 59, TG 63           3. Carotid Doppler   2/19/17- R 50-69%, L 0-49%   8/9/17- R 16-49%, L 1-15%   7/18/18- Jhonatan 16-49%   10/7/19- Jhonatan 16-49%   8/4/20- Jhonatan 10-49%    4. Myocardial perfusion study   5/8/17- (exercise stress) no ischemia     5. Loop   2/23/17 - 3/24/17- NSR         LABORATORY DATA            Lab Results   Component Value Date/Time    WBC 4.5 01/25/2021 08:10 AM    HGB 14.5 01/25/2021 08:10 AM    HCT 41.7 01/25/2021 08:10 AM    PLATELET 271 04/52/3396 08:10 AM    MCV 91 01/25/2021 08:10 AM      Lab Results   Component Value Date/Time    Sodium 145 (H) 01/25/2021 08:10 AM    Potassium 4.4 01/25/2021 08:10 AM    Chloride 104 01/25/2021 08:10 AM    CO2 27 01/25/2021 08:10 AM    Anion gap 6 07/11/2020 06:13 PM    Glucose 93 01/25/2021 08:10 AM    BUN 8 01/25/2021 08:10 AM    Creatinine 0.81 01/25/2021 08:10 AM    BUN/Creatinine ratio 10 (L) 01/25/2021 08:10 AM    GFR est AA 82 01/25/2021 08:10 AM    GFR est non-AA 71 01/25/2021 08:10 AM    Calcium 9.2 01/25/2021 08:10 AM    Bilirubin, total 0.4 01/25/2021 08:10 AM    Alk. phosphatase 55 01/25/2021 08:10 AM    Protein, total 6.0 01/25/2021 08:10 AM    Albumin 4.3 01/25/2021 08:10 AM    Globulin 2.8 07/11/2020 06:13 PM    A-G Ratio 2.5 (H) 01/25/2021 08:10 AM    ALT (SGPT) 11 01/25/2021 08:10 AM           ASSESSMENT/RECOMMENDATIONS:.      1. Syncope  -No further episodes of syncope or dizziness. Blood pressure slightly elevated today but she states this is because of whitecoat syndrome and her daughters who is a nurse and her son who is a paramedic both check her blood pressure and she says is good. 2. Dyslipidemia  -Her LDL is at goal on her current medical regimen with Pravachol    3.   Hypertension encouraged her to have her blood pressure checked regularly at home and should remain elevated call me. 3. Return in 1 year or PRN. No orders of the defined types were placed in this encounter. Follow-up and Dispositions  ·   Return in about 1 year (around 4/26/2022). I have discussed the diagnosis with  Martina Barnes and the intended plan as seen in the above orders. Questions were answered concerning future plans. I have discussed medication side effects and warnings with the patient as well. Thank you,  Marcello Bruno MD for involving me in the care of  Martina Barnes. Please do not hesitate to contact me for further questions/concerns. Vasile Orellana MD, [facility]    Patient Care Team:  Marcello Bruno MD as PCP - General (Family Medicine)  Marcello Bruno MD as PCP - [facility] Provider  Julissa Archer MD (Breast Surgery)  Ammon Moreno MD (Cardiology)    [address]      [phone] / [phone] Fax

## 2021-04-26 NOTE — PROGRESS NOTES
Visit Vitals  BP (!) 145/80   Pulse 80   Ht (P) 5' 5\" (1.651 m)   Wt (P) 164 lb 3.2 oz (74.5 kg)   BMI (P) 27.32 kg/m²

## 2021-06-07 ENCOUNTER — OFFICE VISIT (OUTPATIENT)
Dept: FAMILY MEDICINE CLINIC | Age: 76
End: 2021-06-07
Payer: MEDICARE

## 2021-06-07 VITALS
HEART RATE: 87 BPM | DIASTOLIC BLOOD PRESSURE: 80 MMHG | SYSTOLIC BLOOD PRESSURE: 130 MMHG | TEMPERATURE: 97.1 F | HEIGHT: 63 IN | WEIGHT: 166 LBS | OXYGEN SATURATION: 97 % | BODY MASS INDEX: 29.41 KG/M2

## 2021-06-07 DIAGNOSIS — M79.674 PAIN OF TOE OF RIGHT FOOT: Primary | ICD-10-CM

## 2021-06-07 PROCEDURE — G8427 DOCREV CUR MEDS BY ELIG CLIN: HCPCS | Performed by: STUDENT IN AN ORGANIZED HEALTH CARE EDUCATION/TRAINING PROGRAM

## 2021-06-07 PROCEDURE — 1101F PT FALLS ASSESS-DOCD LE1/YR: CPT | Performed by: STUDENT IN AN ORGANIZED HEALTH CARE EDUCATION/TRAINING PROGRAM

## 2021-06-07 PROCEDURE — 1090F PRES/ABSN URINE INCON ASSESS: CPT | Performed by: STUDENT IN AN ORGANIZED HEALTH CARE EDUCATION/TRAINING PROGRAM

## 2021-06-07 PROCEDURE — G8400 PT W/DXA NO RESULTS DOC: HCPCS | Performed by: STUDENT IN AN ORGANIZED HEALTH CARE EDUCATION/TRAINING PROGRAM

## 2021-06-07 PROCEDURE — G8536 NO DOC ELDER MAL SCRN: HCPCS | Performed by: STUDENT IN AN ORGANIZED HEALTH CARE EDUCATION/TRAINING PROGRAM

## 2021-06-07 PROCEDURE — G8419 CALC BMI OUT NRM PARAM NOF/U: HCPCS | Performed by: STUDENT IN AN ORGANIZED HEALTH CARE EDUCATION/TRAINING PROGRAM

## 2021-06-07 PROCEDURE — G8510 SCR DEP NEG, NO PLAN REQD: HCPCS | Performed by: STUDENT IN AN ORGANIZED HEALTH CARE EDUCATION/TRAINING PROGRAM

## 2021-06-07 PROCEDURE — 99213 OFFICE O/P EST LOW 20 MIN: CPT | Performed by: STUDENT IN AN ORGANIZED HEALTH CARE EDUCATION/TRAINING PROGRAM

## 2021-06-07 NOTE — PROGRESS NOTES
Subjective:     Chief Complaint   Patient presents with    Foot Pain     right foot     HPI:  Marielena Lema is a 67 y.o. female who presents with complaint of right foot pain specifically in the second toe, third toe, and on the plantar side of the foot just proximal to the second and third toe. Pain started about 2 weeks ago when she hit her foot against the bathroom tub when getting out. Described the pain as burning. The injury is actually improving, as swelling has resolved, and pain has improved. The pain now is coming and going and she does limp occasionally. Pain aggravated by walking. Her son advised her to come to the office. Denies pain anywhere else on her foot or leg. She has been using Tylenol occasionally for pain. Able to move toes.   Past Medical History:   Diagnosis Date    Anxiety     Arrhythmia     possible AFIB while in hospital 2017    Arthritis     CAD (coronary artery disease)     carotid blockage     Fatigue     History of bone density study 2015 neg    Leg swelling     Low vitamin D level     Osteopenia     Polio     SOB (shortness of breath)      Family History   Problem Relation Age of Onset    Dementia Mother     Ovarian Cancer Mother 80    Diabetes Mother     Heart Disease Father     Heart Attack Father     Cancer Paternal Uncle      Social History     Socioeconomic History    Marital status:      Spouse name: Not on file    Number of children: Not on file    Years of education: Not on file    Highest education level: Not on file   Occupational History    Not on file   Tobacco Use    Smoking status: Never Smoker    Smokeless tobacco: Never Used   Vaping Use    Vaping Use: Never used   Substance and Sexual Activity    Alcohol use: No    Drug use: No    Sexual activity: Not Currently     Partners: Male     Birth control/protection: None   Other Topics Concern    Not on file   Social History Narrative    Not on file     Social Determinants of Health     Financial Resource Strain:     Difficulty of Paying Living Expenses:    Food Insecurity:     Worried About Running Out of Food in the Last Year:     [address] in the Last Year:    Transportation Needs:     Lack of Transportation (Medical):  Lack of Transportation (Non-Medical):    Physical Activity:     Days of Exercise per Week:     Minutes of Exercise per Session:    Stress:     Feeling of Stress :    Social Connections:     Frequency of Communication with Friends and Family:     Frequency of Social Gatherings with Friends and Family:     Attends Oriental orthodox Services:     Active Member of Clubs or Organizations:     Attends Club or Organization Meetings:     Marital Status:    Intimate Partner Violence:     Fear of Current or Ex-Partner:     Emotionally Abused:     Physically Abused:     Sexually Abused:      Current Outpatient Medications on File Prior to Visit   Medication Sig Dispense Refill    pravastatin (PRAVACHOL) 40 mg tablet TAKE 1 TABLET AT BEDTIME 90 Tab 3    cholecalciferol (VITAMIN D3) 1,000 unit cap Take  by mouth daily.  aspirin 81 mg chewable tablet Take 1 Tab by mouth daily. 30 Tab 0     No current facility-administered medications on file prior to visit. Allergies   Allergen Reactions    E-Mycin [Erythromycin] Nausea Only    Sertraline Other (comments)     Severe nervousness/shaky/jittery      Review of Systems   Musculoskeletal: Positive for joint pain. Objective:     Vitals:    06/07/21 1122   BP: 130/80   Pulse: 87   Temp: 97.1 °F (36.2 °C)   TempSrc: Temporal   SpO2: 97%   Weight: 166 lb (75.3 kg)   Height: 5' 3\" (1.6 m)     Physical Exam  Vitals reviewed. Constitutional:       Appearance: Normal appearance. HENT:      Head: Normocephalic and atraumatic. Cardiovascular:      Rate and Rhythm: Normal rate.    Pulmonary:      Effort: Pulmonary effort is normal.   Musculoskeletal:      Comments: Right foot: NTTP over her foot, and specifically NTTP over second or third toe, plantar side of foot. Normal range of motion of second third toe. Pain is reproduced with passive flexion extension of the third toe. No pain in the second and third toe on active ROM. No swelling or redness noted. Walking with mild limp. Neurological:      Mental Status: She is alert and oriented to person, place, and time. Psychiatric:         Behavior: Behavior normal.            Assessment/Plan:       1. Pain of toe of right foot        -   Overall symptoms have improved. I suspect there may be a strain or small tendinopathy secondary to trauma. X-ray ordered to rule out small fracture. Advised conservative measures, and continue Tylenol. Can use NSAIDs. Symptoms have been improving, but if continues to have discomfort, and x-rays negative will consider MRI versus podiatry consult. -     XR FOOT RT MIN 3 V; Future    Follow-up and Dispositions    · Return if symptoms worsen or fail to improve.             Denny Mcdermott MD

## 2021-06-07 NOTE — PROGRESS NOTES
Chief Complaint   Patient presents with    Foot Pain     right foot     1. Have you been to the ER, urgent care clinic since your last visit? Hospitalized since your last visit? No    2. Have you seen or consulted any other health care providers outside of the [address] [place] [address] since your last visit? Include any pap smears or colon screening. No     The patient  does not have a history of falls. I did complete a risk assessment.     3 most recent PHQ Screens 6/7/2021   Little interest or pleasure in doing things Not at all   Feeling down, depressed, irritable, or hopeless Not at all   Total Score PHQ 2 0     PT is here pain in right foot

## 2021-06-07 NOTE — PATIENT INSTRUCTIONS
Learning About RICE (Rest, Ice, Compression, and Elevation) What is RICE? RICE is a way to care for an injury. RICE helps relieve pain and swelling. It may also help with healing and flexibility. RICE stands for: · R est and protect the injured or sore area. · I ce or a cold pack used as soon as possible. · C ompression, or wrapping the injured or sore area with an elastic bandage. · E levation (propping up) the injured or sore area. How do you do RICE? You can use RICE for home treatment when you have general aches and pains or after an injury or surgery. Rest 
· Do not put weight on the injury for at least 24 to 48 hours. · Use crutches for a badly sprained knee or ankle. · Support a sprained wrist, elbow, or shoulder with a sling. Ice · Put ice or a cold pack on the injury right away to reduce pain and swelling. Frozen vegetables will also work as an ice pack. Put a thin cloth between the ice or cold pack and your skin. The cloth protects the injured area from getting too cold. · Use ice for 10 to 15 minutes at a time for the first 48 to 72 hours. Compression · Use compression for sprains, strains, and surgeries of the arms and legs. · Wrap the injured area with an elastic bandage or compression sleeve to reduce swelling. · Don't wrap it too tightly. If the area below it feels numb, tingles, or feels cool, loosen the wrap. Elevation · Use elevation for areas of the body that can be propped up, such as arms and legs. · Prop up the injured area on pillows whenever you use ice. Keep it propped up anytime you sit or lie down. · Try to keep the injured area at or above the level of your heart. This will help reduce swelling and bruising. Where can you learn more? Go to http://www.gray.com/ Enter G181 in the search box to learn more about \"Learning About RICE (Rest, Ice, Compression, and Elevation). \" 
Current as of: November 16, 2020               Content Version: 12.8 © 4890-7978 [employer]. Care instructions adapted under license by [employer] (which disclaims liability or warranty for this information). If you have questions about a medical condition or this instruction, always ask your healthcare professional. [address] any warranty or liability for your use of this information.

## 2021-06-10 DIAGNOSIS — M79.674 PAIN OF TOE OF RIGHT FOOT: ICD-10-CM

## 2021-10-07 ENCOUNTER — OFFICE VISIT (OUTPATIENT)
Dept: FAMILY MEDICINE CLINIC | Age: 76
End: 2021-10-07
Payer: MEDICARE

## 2021-10-07 VITALS
HEART RATE: 82 BPM | BODY MASS INDEX: 29.23 KG/M2 | HEIGHT: 63 IN | SYSTOLIC BLOOD PRESSURE: 150 MMHG | TEMPERATURE: 97.3 F | WEIGHT: 165 LBS | DIASTOLIC BLOOD PRESSURE: 100 MMHG | RESPIRATION RATE: 16 BRPM | OXYGEN SATURATION: 97 %

## 2021-10-07 DIAGNOSIS — S00.412A ABRASION OF LEFT EAR, INITIAL ENCOUNTER: Primary | ICD-10-CM

## 2021-10-07 DIAGNOSIS — R03.0 ELEVATED BLOOD PRESSURE READING WITHOUT DIAGNOSIS OF HYPERTENSION: ICD-10-CM

## 2021-10-07 DIAGNOSIS — H92.02 LEFT EAR PAIN: ICD-10-CM

## 2021-10-07 PROCEDURE — G8400 PT W/DXA NO RESULTS DOC: HCPCS | Performed by: NURSE PRACTITIONER

## 2021-10-07 PROCEDURE — G8427 DOCREV CUR MEDS BY ELIG CLIN: HCPCS | Performed by: NURSE PRACTITIONER

## 2021-10-07 PROCEDURE — 1101F PT FALLS ASSESS-DOCD LE1/YR: CPT | Performed by: NURSE PRACTITIONER

## 2021-10-07 PROCEDURE — G8536 NO DOC ELDER MAL SCRN: HCPCS | Performed by: NURSE PRACTITIONER

## 2021-10-07 PROCEDURE — 99213 OFFICE O/P EST LOW 20 MIN: CPT | Performed by: NURSE PRACTITIONER

## 2021-10-07 PROCEDURE — G8432 DEP SCR NOT DOC, RNG: HCPCS | Performed by: NURSE PRACTITIONER

## 2021-10-07 PROCEDURE — 1090F PRES/ABSN URINE INCON ASSESS: CPT | Performed by: NURSE PRACTITIONER

## 2021-10-07 PROCEDURE — G8419 CALC BMI OUT NRM PARAM NOF/U: HCPCS | Performed by: NURSE PRACTITIONER

## 2021-10-07 NOTE — PROGRESS NOTES
Subjective  Chief Complaint   Patient presents with    Other     left ear stopped up     HPI:  Roseanna Sadler is a 67 y.o. female. Patient presents with complaint of left ear pain for the past 3-4 days. Started Monday as ear pressure and feeling \"stopped up\". Worsened to pain yesterday. Evaluation to date: none  Treatment to date: Unable to insert Qtip in ear canal last night  Relevant PMH: No pertinent additional PMH. Attributes BP elevation to whitecoat syndrome. Reported home BP readings consistently less than 135/85 on both numbers.     Past Medical History:   Diagnosis Date    Anxiety     Arrhythmia     possible AFIB while in hospital 2017    Arthritis     CAD (coronary artery disease)     carotid blockage     Fatigue     History of bone density study 2015 neg    Leg swelling     Low vitamin D level     Osteopenia     Polio     SOB (shortness of breath)      Family History   Problem Relation Age of Onset    Dementia Mother     Ovarian Cancer Mother 80    Diabetes Mother     Heart Disease Father     Heart Attack Father     Cancer Paternal Uncle      Social History     Socioeconomic History    Marital status:      Spouse name: Not on file    Number of children: Not on file    Years of education: Not on file    Highest education level: Not on file   Occupational History    Not on file   Tobacco Use    Smoking status: Never Smoker    Smokeless tobacco: Never Used   Vaping Use    Vaping Use: Never used   Substance and Sexual Activity    Alcohol use: No    Drug use: No    Sexual activity: Not Currently     Partners: Male     Birth control/protection: None   Other Topics Concern    Not on file   Social History Narrative    Not on file     Social Determinants of Health     Financial Resource Strain:     Difficulty of Paying Living Expenses:    Food Insecurity:     Worried About Running Out of Food in the Last Year:     Sade of Food in the Last Year:    Transportation Needs:     Lack of Transportation (Medical):  Lack of Transportation (Non-Medical):    Physical Activity:     Days of Exercise per Week:     Minutes of Exercise per Session:    Stress:     Feeling of Stress :    Social Connections:     Frequency of Communication with Friends and Family:     Frequency of Social Gatherings with Friends and Family:     Attends Anglican Services:     Active Member of Clubs or Organizations:     Attends Club or Organization Meetings:     Marital Status:    Intimate Partner Violence:     Fear of Current or Ex-Partner:     Emotionally Abused:     Physically Abused:     Sexually Abused:      Current Outpatient Medications on File Prior to Visit   Medication Sig Dispense Refill    pravastatin (PRAVACHOL) 40 mg tablet TAKE 1 TABLET AT BEDTIME 90 Tab 3    cholecalciferol (VITAMIN D3) 1,000 unit cap Take  by mouth daily.  aspirin 81 mg chewable tablet Take 1 Tab by mouth daily. 30 Tab 0     No current facility-administered medications on file prior to visit. Allergies   Allergen Reactions    E-Mycin [Erythromycin] Nausea Only    Sertraline Other (comments)     Severe nervousness/shaky/jittery      ROS  See HPI for pertinent ROS. Objective  Visit Vitals  BP (!) 150/100   Pulse 82   Temp 97.3 °F (36.3 °C) (Temporal)   Resp 16   Ht 5' 3\" (1.6 m)   Wt 165 lb (74.8 kg)   SpO2 97%   BMI 29.23 kg/m²       Physical Exam  Vitals and nursing note reviewed. Constitutional:       General: She is not in acute distress. Appearance: Normal appearance. HENT:      Head: Normocephalic. Right Ear: Tympanic membrane and ear canal normal.      Left Ear: Tympanic membrane normal. Laceration (Abrasion with old blood to external third of ear canal with no signs of active bleeding.) present. Eyes:      Extraocular Movements: Extraocular movements intact. Pulmonary:      Effort: Pulmonary effort is normal.   Musculoskeletal:         General: Normal range of motion. Right lower leg: No edema. Left lower leg: No edema. Skin:     General: Skin is warm and dry. Neurological:      Mental Status: She is alert and oriented to person, place, and time. Psychiatric:         Mood and Affect: Mood normal.         Behavior: Behavior normal.          Assessment & Plan      ICD-10-CM ICD-9-CM    1. Abrasion of left ear, initial encounter  S00.412A 910.0    2. Left ear pain  H92.02 388.70    3. Elevated blood pressure reading without diagnosis of hypertension  R03.0 796.2      Diagnoses and all orders for this visit:    1. Abrasion of left ear, initial encounter  Likely etiology of pain. No active discharge on exam today. Limit use of Qtips to external ear only. Rinse gently with warm water while showering. Can try to gently apply antibiotic ointment via Qtip external canal. Return to the office in 1 week for recheck. 2. Left ear pain  As above. 3. Elevated blood pressure reading without diagnosis of hypertension  BP is above goal in the office today but below goal on reported home readings. Advised to check BP upon return home and call if consistently greater than 150/90 on either number. Follow-up and Dispositions    · Return in 1 week (on 10/14/2021), or if symptoms worsen or fail to improve, for recheck ear anytime next week.            Silvia Faith NP

## 2021-10-07 NOTE — PROGRESS NOTES
Chief Complaint   Patient presents with    Other     left ear stopped up   1. Have you been to the ER, urgent care clinic since your last visit? Hospitalized since your last visit? No    2. Have you seen or consulted any other health care providers outside of the [address] [place] [address] since your last visit? Include any pap smears or colon screening.  No   3 most recent PHQ Screens 10/7/2021   Little interest or pleasure in doing things Not at all   Feeling down, depressed, irritable, or hopeless Not at all   Total Score PHQ 2 0     Visit Vitals  BP (!) 158/100 (BP 1 Location: Left upper arm, BP Patient Position: Sitting)   Pulse 82   Temp 97.3 °F (36.3 °C) (Temporal)   Resp 16   Ht 5' 3\" (1.6 m)   Wt 165 lb (74.8 kg)   SpO2 97%   BMI 29.23 kg/m²

## 2021-10-14 ENCOUNTER — OFFICE VISIT (OUTPATIENT)
Dept: NEUROLOGY | Age: 76
End: 2021-10-14

## 2021-10-14 VITALS
OXYGEN SATURATION: 97 % | SYSTOLIC BLOOD PRESSURE: 142 MMHG | TEMPERATURE: 97.3 F | BODY MASS INDEX: 29.23 KG/M2 | HEART RATE: 86 BPM | RESPIRATION RATE: 16 BRPM | DIASTOLIC BLOOD PRESSURE: 70 MMHG | WEIGHT: 165 LBS

## 2021-10-14 DIAGNOSIS — I65.23 BILATERAL CAROTID ARTERY STENOSIS: Primary | ICD-10-CM

## 2021-10-14 PROCEDURE — G8510 SCR DEP NEG, NO PLAN REQD: HCPCS | Performed by: PSYCHIATRY & NEUROLOGY

## 2021-10-14 PROCEDURE — 1101F PT FALLS ASSESS-DOCD LE1/YR: CPT | Performed by: PSYCHIATRY & NEUROLOGY

## 2021-10-14 PROCEDURE — G8427 DOCREV CUR MEDS BY ELIG CLIN: HCPCS | Performed by: PSYCHIATRY & NEUROLOGY

## 2021-10-14 PROCEDURE — G8536 NO DOC ELDER MAL SCRN: HCPCS | Performed by: PSYCHIATRY & NEUROLOGY

## 2021-10-14 PROCEDURE — G8400 PT W/DXA NO RESULTS DOC: HCPCS | Performed by: PSYCHIATRY & NEUROLOGY

## 2021-10-14 PROCEDURE — G8419 CALC BMI OUT NRM PARAM NOF/U: HCPCS | Performed by: PSYCHIATRY & NEUROLOGY

## 2021-10-14 PROCEDURE — 99213 OFFICE O/P EST LOW 20 MIN: CPT | Performed by: PSYCHIATRY & NEUROLOGY

## 2021-10-14 PROCEDURE — 1090F PRES/ABSN URINE INCON ASSESS: CPT | Performed by: PSYCHIATRY & NEUROLOGY

## 2021-10-14 NOTE — PROGRESS NOTES
[facility] and [address] at [facility] at [address] [facility], [address] [facility] [address] [facility], [address] [place] [address]   [phone]              No chief complaint on file. Current Outpatient Medications   Medication Sig Dispense Refill    pravastatin (PRAVACHOL) 40 mg tablet TAKE 1 TABLET AT BEDTIME 90 Tab 3    cholecalciferol (VITAMIN D3) 1,000 unit cap Take  by mouth daily.  aspirin 81 mg chewable tablet Take 1 Tab by mouth daily. 30 Tab 0      Allergies   Allergen Reactions    E-Mycin [Erythromycin] Nausea Only    Sertraline Other (comments)     Severe nervousness/shaky/jittery      Social History     Tobacco Use    Smoking status: Never Smoker    Smokeless tobacco: Never Used   Vaping Use    Vaping Use: Never used   Substance Use Topics    Alcohol use: No    Drug use: No   58-year-old lady who presents today for follow-up. Her last visit with me was last year around this time. She follows for carotid stenosis. Today she reports she has been doing well. No neurologic symptoms. She is staying active working in the yard. No new complaints. She had her Doppler in the office and I reviewed that on the acquisition station. No change in her stenosis. 16-49% bilaterally. Review of records finds Medicare wellness visit with Dr. Nic Bowser January 25 of this year where she was noted to continue to follow with cardiology for A. fib. She was taking her medications as directed. More sedentary since Covid came on. She had her required screenings. She had pneumonia vaccine. She was ordered a DEXA scan and screening labs  Laboratory analysis dated January 25, 2021  Vitamin D normal  CBC normal  Metabolic panel unremarkable  Lipid panel LDL 59    Office visit with cardiology, , is following up for syncope.   She had another event and went to the emergency room with normal CT and laboratory analysis. She was hydrated. He reported no seizure-like activity. His examination was unremarkable. She was to follow-up in a year. She was encouraged to check her blood pressure regularly at home. Examination  Visit Vitals  BP (!) 142/70 (BP 1 Location: Left upper arm, BP Patient Position: Sitting, BP Cuff Size: Adult)   Pulse 86   Temp 97.3 °F (36.3 °C)   Resp 16   Wt 74.8 kg (165 lb)   SpO2 97%   BMI 29.23 kg/m²     She is a very pleasant lady. Awake, alert and oriented. No icterus. CN intact 2-12 without nystagmus. No pronation or drift. Resists fully in all 4 extrems. DTR symmetric in all 4 extremities. No ataxia. Steady gait. Impression/Plan  Carotid artery stenosis mild  We will have her follow-up in a year with a Doppler the same day  Her preference would be the [place] office and hopefully that office will be open at that point  She will call in February to schedule follow-up for October at Taisha Hope MD        This note was created using voice recognition software. Despite editing, there may be syntax errors.

## 2021-10-15 ENCOUNTER — OFFICE VISIT (OUTPATIENT)
Dept: FAMILY MEDICINE CLINIC | Age: 76
End: 2021-10-15
Payer: MEDICARE

## 2021-10-15 VITALS
TEMPERATURE: 97.3 F | DIASTOLIC BLOOD PRESSURE: 78 MMHG | OXYGEN SATURATION: 98 % | HEART RATE: 82 BPM | BODY MASS INDEX: 29.61 KG/M2 | RESPIRATION RATE: 16 BRPM | HEIGHT: 63 IN | WEIGHT: 167.13 LBS | SYSTOLIC BLOOD PRESSURE: 138 MMHG

## 2021-10-15 DIAGNOSIS — H65.90 FLUID COLLECTION OF MIDDLE EAR: ICD-10-CM

## 2021-10-15 DIAGNOSIS — S00.412A ABRASION OF LEFT EAR, INITIAL ENCOUNTER: Primary | ICD-10-CM

## 2021-10-15 PROCEDURE — G8419 CALC BMI OUT NRM PARAM NOF/U: HCPCS | Performed by: NURSE PRACTITIONER

## 2021-10-15 PROCEDURE — G8536 NO DOC ELDER MAL SCRN: HCPCS | Performed by: NURSE PRACTITIONER

## 2021-10-15 PROCEDURE — G8427 DOCREV CUR MEDS BY ELIG CLIN: HCPCS | Performed by: NURSE PRACTITIONER

## 2021-10-15 PROCEDURE — 99214 OFFICE O/P EST MOD 30 MIN: CPT | Performed by: NURSE PRACTITIONER

## 2021-10-15 PROCEDURE — 1090F PRES/ABSN URINE INCON ASSESS: CPT | Performed by: NURSE PRACTITIONER

## 2021-10-15 PROCEDURE — G8400 PT W/DXA NO RESULTS DOC: HCPCS | Performed by: NURSE PRACTITIONER

## 2021-10-15 PROCEDURE — G8432 DEP SCR NOT DOC, RNG: HCPCS | Performed by: NURSE PRACTITIONER

## 2021-10-15 PROCEDURE — 1101F PT FALLS ASSESS-DOCD LE1/YR: CPT | Performed by: NURSE PRACTITIONER

## 2021-10-15 RX ORDER — LEVOCETIRIZINE DIHYDROCHLORIDE 5 MG/1
5 TABLET, FILM COATED ORAL
Qty: 90 TABLET | Refills: 0 | Status: SHIPPED | OUTPATIENT
Start: 2021-10-15 | End: 2022-01-11

## 2021-10-15 NOTE — PROGRESS NOTES
Subjective  Chief Complaint   Patient presents with    Follow-up     ear pain, sinus? HPI:  Haroon Zamora is a 67 y.o. female. Patient presents for follow up evaluation of left ear abrasion. Pain has resolved. Reports ear pressure and fullness for the past day. Pressure worsens when she lays on left side. Denies change in hearing. No OTC meds used. Past Medical History:   Diagnosis Date    Anxiety     Arrhythmia     possible AFIB while in hospital 2017    Arthritis     CAD (coronary artery disease)     carotid blockage     Fatigue     History of bone density study 2015 neg    Leg swelling     Low vitamin D level     Osteopenia     Polio     SOB (shortness of breath)      Family History   Problem Relation Age of Onset    Dementia Mother     Ovarian Cancer Mother 80    Diabetes Mother     Heart Disease Father     Heart Attack Father     Cancer Paternal Uncle      Social History     Socioeconomic History    Marital status:      Spouse name: Not on file    Number of children: Not on file    Years of education: Not on file    Highest education level: Not on file   Occupational History    Not on file   Tobacco Use    Smoking status: Never Smoker    Smokeless tobacco: Never Used   Vaping Use    Vaping Use: Never used   Substance and Sexual Activity    Alcohol use: No    Drug use: No    Sexual activity: Not Currently     Partners: Male     Birth control/protection: None   Other Topics Concern    Not on file   Social History Narrative    Not on file     Social Determinants of Health     Financial Resource Strain:     Difficulty of Paying Living Expenses:    Food Insecurity:     Worried About Running Out of Food in the Last Year:     Ran Out of Food in the Last Year:    Transportation Needs:     Lack of Transportation (Medical):      Lack of Transportation (Non-Medical):    Physical Activity:     Days of Exercise per Week:     Minutes of Exercise per Session:    Stress:  Feeling of Stress :    Social Connections:     Frequency of Communication with Friends and Family:     Frequency of Social Gatherings with Friends and Family:     Attends [employer] Services:     Active Member of Clubs or Organizations:     Attends Club or Organization Meetings:     Marital Status:    Intimate Partner Violence:     Fear of Current or Ex-Partner:     Emotionally Abused:     Physically Abused:     Sexually Abused:      Current Outpatient Medications on File Prior to Visit   Medication Sig Dispense Refill    pravastatin (PRAVACHOL) 40 mg tablet TAKE 1 TABLET AT BEDTIME 90 Tab 3    cholecalciferol (VITAMIN D3) 1,000 unit cap Take  by mouth daily.  aspirin 81 mg chewable tablet Take 1 Tab by mouth daily. 30 Tab 0     No current facility-administered medications on file prior to visit. Allergies   Allergen Reactions    E-Mycin [Erythromycin] Nausea Only    Sertraline Other (comments)     Severe nervousness/shaky/jittery      ROS  See HPI for pertinent ROS. Objective  Visit Vitals  /78 (BP 1 Location: Right upper arm, BP Patient Position: Sitting)   Pulse 82   Temp 97.3 °F (36.3 °C) (Temporal)   Resp 16   Ht 5' 3\" (1.6 m)   Wt 167 lb 2 oz (75.8 kg)   SpO2 98%   BMI 29.60 kg/m²       Physical Exam  Vitals and nursing note reviewed. Constitutional:       General: She is not in acute distress. Appearance: Normal appearance. HENT:      Head: Normocephalic. Right Ear: A middle ear effusion is present. Left Ear: Laceration present. A middle ear effusion is present. Ears:      Comments: Left ear abrasion healing well, old discharge has cleared  Eyes:      Extraocular Movements: Extraocular movements intact. Pulmonary:      Effort: Pulmonary effort is normal.   Musculoskeletal:         General: Normal range of motion. Right lower leg: No edema. Left lower leg: No edema. Skin:     General: Skin is warm and dry.    Neurological:      Mental Status: She is alert and oriented to person, place, and time. Psychiatric:         Mood and Affect: Mood normal.         Behavior: Behavior normal.          Assessment & Plan      ICD-10-CM ICD-9-CM    1. Abrasion of left ear, initial encounter  S00.412A 910.0    2. Fluid collection of middle ear  H65.90 381.4 levocetirizine (XYZAL) 5 mg tablet     Diagnoses and all orders for this visit:    1. Abrasion of left ear, initial encounter  Healing well. Continue to avoid inserting anything in her ear. 2. Fluid collection of middle ear  Middle ear fluid is typically self resolving and due to allergic or viral cause. Fluid will resolve once underlying issues has resolved. Medication as ordered. Decongestant prn. Call if symptoms persist or worsen. -     levocetirizine (XYZAL) 5 mg tablet; Take 1 Tablet by mouth nightly as needed for Allergies.             Dorie Denney NP

## 2021-10-15 NOTE — PATIENT INSTRUCTIONS
Middle Ear Fluid: Care Instructions  Your Care Instructions     Fluid often builds up inside the ear during a cold or allergies. Usually the fluid drains away, but sometimes a small tube in the ear, called the eustachian tube, stays blocked for months. Symptoms of fluid buildup may include:  · Popping, ringing, or a feeling of fullness or pressure in the ear. · Trouble hearing. · Balance problems and dizziness. In most cases, you can treat yourself at home. Follow-up care is a key part of your treatment and safety. Be sure to make and go to all appointments, and call your doctor if you are having problems. It's also a good idea to know your test results and keep a list of the medicines you take. How can you care for yourself at home? · In most cases, the fluid clears up within a few months without treatment. You may need more tests if the fluid does not clear up after 3 months. · If your doctor prescribed antibiotics, take them as directed. Do not stop taking them just because you feel better. You need to take the full course of antibiotics. When should you call for help? Call your doctor now or seek immediate medical care if:    · You have symptoms of infection, such as:  ? Increased pain, swelling, warmth, or redness. ? Pus draining from the area. ? A fever. Watch closely for changes in your health, and be sure to contact your doctor if:    · You notice changes in hearing.     · You do not get better as expected. Where can you learn more? Go to http://www.gray.com/  Enter C661 in the search box to learn more about \"Middle Ear Fluid: Care Instructions. \"  Current as of: December 2, 2020               Content Version: 13.0  © 4535-0133 [employer]. Care instructions adapted under license by [employer] (which disclaims liability or warranty for this information).  If you have questions about a medical condition or this instruction, always ask your healthcare professional. [place] [address] any warranty or liability for your use of this information.

## 2021-10-15 NOTE — PROGRESS NOTES
Chief Complaint   Patient presents with    Follow-up     ear pain, sinus? 1. Have you been to the ER, urgent care clinic since your last visit? Hospitalized since your last visit? No    2. Have you seen or consulted any other health care providers outside of the [address] [place] [address] since your last visit? Include any pap smears or colon screening.  No   3 most recent PHQ Screens 10/15/2021   Little interest or pleasure in doing things Not at all   Feeling down, depressed, irritable, or hopeless Not at all   Total Score PHQ 2 0     Visit Vitals  /78 (BP 1 Location: Right upper arm, BP Patient Position: Sitting)   Pulse 82   Resp 16   Ht 5' 3\" (1.6 m)   Wt 167 lb 2 oz (75.8 kg)   SpO2 98%   BMI 29.60 kg/m²

## 2021-10-18 ENCOUNTER — DOCUMENTATION ONLY (OUTPATIENT)
Dept: NEUROLOGY | Age: 76
End: 2021-10-18

## 2021-10-18 NOTE — PROGRESS NOTES
[facility] AT [place]   Carotid Doppler Report      Patient: Irma Ramirez  1945  Date of Service: 10/14/2021  Referring:  No ref. provider found    B-mode imaging reveals mild mixed plaquing at the bifurcations extending into the internal carotid artery segments bilaterally. Doppler spectral analysis reveals no significant velocity shifts. Vertebral artery flow antegrade bilaterally.     Interpretation  16-49% bilateral ICA      Savi Argueta MD

## 2021-10-27 ENCOUNTER — TRANSCRIBE ORDER (OUTPATIENT)
Dept: SCHEDULING | Age: 76
End: 2021-10-27

## 2021-10-27 ENCOUNTER — HOSPITAL ENCOUNTER (OUTPATIENT)
Dept: MAMMOGRAPHY | Age: 76
Discharge: HOME | End: 2021-10-27
Payer: MEDICARE

## 2021-10-27 DIAGNOSIS — Z12.31 SCREENING MAMMOGRAM FOR HIGH-RISK PATIENT: Primary | ICD-10-CM

## 2021-10-27 PROCEDURE — 77063 BREAST TOMOSYNTHESIS BI: CPT

## 2022-01-21 ENCOUNTER — TELEPHONE (OUTPATIENT)
Dept: FAMILY MEDICINE CLINIC | Age: 77
End: 2022-01-21

## 2022-01-21 NOTE — TELEPHONE ENCOUNTER
I spoke with patient and confirmed her appointment for 1/24/2022 and explained she is allowed 1 medicare wellness per year and her last was 1/25/2021 so as long as a year later and same month okay to schedule wellness.

## 2022-01-21 NOTE — TELEPHONE ENCOUNTER
----- Message from Howells Anamika sent at 1/21/2022 10:33 AM EST -----  Subject: Appointment Request    Reason for Call: Routine Medicare AWV    QUESTIONS  Type of Appointment? Established Patient  Reason for appointment request? No appointments available during search  Additional Information for Provider? Pt would like to schedule her AWV. She already has an briana scheduled for 01/24/2022 but her AWV is not due   until 01/26/2022  ---------------------------------------------------------------------------  --------------  CALL BACK INFO  What is the best way for the office to contact you? OK to leave message on   voicemail  Preferred Call Back Phone Number? [phone]  ---------------------------------------------------------------------------  --------------  SCRIPT ANSWERS  Relationship to Patient? Self  Have your symptoms changed? No  (If the patient has Medicare as their primary insurance coverage ask this   question) Are you requesting a Medicare Annual Wellness Visit? Yes   (Is the patient requesting a pap smear with their physical exam?)? No  (Is the patient requesting their annual physical and does not need PAP or   AWV per above?)? No  Have you been diagnosed with, awaiting test results for, or told that you   are suspected of having COVID-19 (Coronavirus)? (If patient has tested   negative or was tested as a requirement for work, school, or travel and   not based on symptoms, answer no)? No  Within the past two weeks have you developed any of the following symptoms   (answer no if symptoms have been present longer than 2 weeks or began   more than 2 weeks ago)? Fever or Chills, Cough, Shortness of breath or   difficulty breathing, Loss of taste or smell, Sore throat, Nasal   congestion, Sneezing or runny nose, Fatigue or generalized body aches   (answer no if pain is specific to a body part e.g. back pain), Diarrhea,   Headache?  No  Have you had close contact with someone with COVID-19 in the last 14 days?   No  (Service Expert  click yes below to proceed with [employer] As Usual   Scheduling)?  Yes

## 2022-01-24 ENCOUNTER — OFFICE VISIT (OUTPATIENT)
Dept: FAMILY MEDICINE CLINIC | Age: 77
End: 2022-01-24
Payer: MEDICARE

## 2022-01-24 VITALS
SYSTOLIC BLOOD PRESSURE: 132 MMHG | HEART RATE: 75 BPM | WEIGHT: 166.4 LBS | HEIGHT: 63 IN | DIASTOLIC BLOOD PRESSURE: 72 MMHG | OXYGEN SATURATION: 97 % | BODY MASS INDEX: 29.48 KG/M2 | TEMPERATURE: 97.2 F

## 2022-01-24 DIAGNOSIS — Z13.31 DEPRESSION SCREENING: ICD-10-CM

## 2022-01-24 DIAGNOSIS — Z11.59 SCREENING FOR VIRAL DISEASE: ICD-10-CM

## 2022-01-24 DIAGNOSIS — Z13.39 ALCOHOL SCREENING: ICD-10-CM

## 2022-01-24 DIAGNOSIS — E55.9 VITAMIN D DEFICIENCY: ICD-10-CM

## 2022-01-24 DIAGNOSIS — E78.00 PURE HYPERCHOLESTEROLEMIA: ICD-10-CM

## 2022-01-24 DIAGNOSIS — T14.8XXA PULLED MUSCLE: ICD-10-CM

## 2022-01-24 DIAGNOSIS — Z00.00 WELLNESS EXAMINATION: Primary | ICD-10-CM

## 2022-01-24 DIAGNOSIS — I48.0 PAROXYSMAL ATRIAL FIBRILLATION (HCC): ICD-10-CM

## 2022-01-24 DIAGNOSIS — Z28.21 REFUSED INFLUENZA VACCINE: ICD-10-CM

## 2022-01-24 DIAGNOSIS — E66.3 OVERWEIGHT WITH BODY MASS INDEX (BMI) OF 29 TO 29.9 IN ADULT: ICD-10-CM

## 2022-01-24 DIAGNOSIS — Z23 ENCOUNTER FOR IMMUNIZATION: ICD-10-CM

## 2022-01-24 PROCEDURE — G0439 PPPS, SUBSEQ VISIT: HCPCS | Performed by: FAMILY MEDICINE

## 2022-01-24 PROCEDURE — 99214 OFFICE O/P EST MOD 30 MIN: CPT | Performed by: FAMILY MEDICINE

## 2022-01-24 PROCEDURE — G8536 NO DOC ELDER MAL SCRN: HCPCS | Performed by: FAMILY MEDICINE

## 2022-01-24 PROCEDURE — 1090F PRES/ABSN URINE INCON ASSESS: CPT | Performed by: FAMILY MEDICINE

## 2022-01-24 PROCEDURE — G8510 SCR DEP NEG, NO PLAN REQD: HCPCS | Performed by: FAMILY MEDICINE

## 2022-01-24 PROCEDURE — 1101F PT FALLS ASSESS-DOCD LE1/YR: CPT | Performed by: FAMILY MEDICINE

## 2022-01-24 PROCEDURE — G8400 PT W/DXA NO RESULTS DOC: HCPCS | Performed by: FAMILY MEDICINE

## 2022-01-24 PROCEDURE — G8427 DOCREV CUR MEDS BY ELIG CLIN: HCPCS | Performed by: FAMILY MEDICINE

## 2022-01-24 PROCEDURE — G8419 CALC BMI OUT NRM PARAM NOF/U: HCPCS | Performed by: FAMILY MEDICINE

## 2022-01-24 RX ORDER — ZOSTER VACCINE RECOMBINANT, ADJUVANTED 50 MCG/0.5
0.5 KIT INTRAMUSCULAR ONCE
Qty: 0.5 ML | Refills: 1 | Status: SHIPPED | OUTPATIENT
Start: 2022-01-24 | End: 2022-01-24

## 2022-01-24 RX ORDER — TETANUS TOXOID, REDUCED DIPHTHERIA TOXOID AND ACELLULAR PERTUSSIS VACCINE, ADSORBED 5; 2.5; 8; 8; 2.5 [IU]/.5ML; [IU]/.5ML; UG/.5ML; UG/.5ML; UG/.5ML
0.5 SUSPENSION INTRAMUSCULAR ONCE
Qty: 0.5 ML | Refills: 0 | Status: SHIPPED | OUTPATIENT
Start: 2022-01-24 | End: 2022-01-24

## 2022-01-24 NOTE — PROGRESS NOTES
Medicare Wellness Exam:    Chief Complaint   Patient presents with    Annual Wellness Visit    Other     patient pulled muscle behind knee on right leg  cleaning yesterday      she is a 67y.o. year old female who presents for evaluation for their Medicare Wellness Visit. She is also following up on histories of hyperlipidemia, vitamin D deficiency, and A. fib. She reports taking listed medications daily as directed. She sees her cardiologist and neurologist each once per year. Her only acute complaint today is for a pulled muscle in her right calf. She was cleaning behind her toilet yesterday when she went to stand up and just pulled a muscle. No swelling. She actually reports that her leg swelling overall is much better since she stopped drinking sodas and switch more to water. Fall Screen is completed and assessed=yes. Depression Screen is completed and assessed=yes  Medication list reviewed and adjusted for accuracy=yes  Immunizations reviewed and updated=yes  Health/Preventative Screenings reviewed and updated=yes  ADL Functions reviewed=yes  MiniCog Score= 5/5 (2 points for clock and 3/3 points for recall)   See EMR questionnaires and scanned medicare wellness documents for full details. Patient Active Problem List    Diagnosis    Paroxysmal atrial fibrillation [facility])     Sees Dr. Jolene Lancaster annually. No current symptoms.  Carotid artery stenosis    Generalized anxiety disorder    Pure hypercholesterolemia    Osteopenia    Overweight with body mass index (BMI) of 29 to 29.9 in adult    Vitamin D deficiency    Urinary incontinence    Syncope       Reviewed PmHx, RxHx, FmHx, SocHx, AllgHx and updated and dated in the chart. Review of Systems   Constitutional: Negative for chills, fever and malaise/fatigue. Respiratory: Negative for cough, shortness of breath and wheezing. Cardiovascular: Negative for chest pain, palpitations and leg swelling.    Gastrointestinal: Negative for diarrhea and vomiting. Genitourinary: Negative for dysuria. Neurological: Negative for dizziness, tingling and weakness. Psychiatric/Behavioral: Negative for depression. The patient is not nervous/anxious. Objective:     Vitals:    01/24/22 0738 01/24/22 0802   BP: (!) 142/80 132/72   Pulse: 75    Temp: 97.2 °F (36.2 °C)    TempSrc: Temporal    SpO2: 97%    Weight: 166 lb 6.4 oz (75.5 kg)    Height: 5' 3\" (1.6 m)      Physical Exam  Constitutional:       General: She is not in acute distress. Appearance: Normal appearance. She is overweight. HENT:      Head: Normocephalic and atraumatic. Neck:      Thyroid: No thyroid mass or thyromegaly. Cardiovascular:      Rate and Rhythm: Normal rate and regular rhythm. Heart sounds: No murmur heard. Pulmonary:      Effort: Pulmonary effort is normal. No respiratory distress. Breath sounds: Normal breath sounds. No wheezing. Musculoskeletal:      Cervical back: Neck supple. No muscular tenderness. Right lower leg: No edema. Left lower leg: No edema. Lymphadenopathy:      Cervical: No cervical adenopathy. Skin:     General: Skin is warm and dry. Neurological:      Mental Status: She is alert and oriented to person, place, and time. Mental status is at baseline. Psychiatric:         Attention and Perception: Attention and perception normal.         Mood and Affect: Mood and affect normal.         Speech: Speech normal.         Behavior: Behavior normal.          Assessment/ Plan:   Diagnoses and all orders for this visit:    1. Wellness examination  We are getting patient up to date on recommended preventative measures as noted. 2. Alcohol screening    3. Depression screening    4. Screening for viral disease  -     HCV AB W/RFLX TO KAYLIN    5. Encounter for immunization  -     diphth,pertus,acell,,tetanus (Boostrix Tdap) 2.5-8-5 Lf-mcg-Lf/0.5mL susp suspension; 0.5 mL by IntraMUSCular route once for 1 dose.   - varicella-zoster recombinant, PF, (Shingrix, PF,) 50 mcg/0.5 mL susr injection; 0.5 mL by IntraMUSCular route once for 1 dose. 0.5ml IM. Repeat in -6 mos. Patient instructed to go to pharmacy for Shingrix series and tetanus. 6. Refused influenza vaccine    7. Pure hypercholesterolemia  -     LIPID PANEL  -     METABOLIC PANEL, COMPREHENSIVE  -     CBC WITH AUTOMATED DIFF  Patient reports taking medication daily as directed. We are checking annual level with labs. 8. Overweight with body mass index (BMI) of 29 to 29.9 in adult    9. Vitamin D deficiency  -     VITAMIN D, 25 HYDROXY  Patient reports taking 1000 unit supplement daily. We are checking level with labs to ensure it is at goal on this dosing. 10. Paroxysmal atrial fibrillation [facility])  Assessment & Plan:   Sees Dr. Luther Omer annually. No current symptoms. 11. Pulled muscle  I showed patient some stretches that she can use for this area. She should do this periodically throughout the day and expect to see some improvement after the next few days.      -Pain evaluation performed today  -Cognitive Screen performed today  -Depression Screen, Fall risks (by up and go test)  and ADL functionality were addressed  -Medication list updated and reviewed for any changes   -A comprehensive review of medical issues and a plan was formulated  -End of life planning was addressed with pt   -Health Screenings for preventions were addressed and a plan was formulated  -Discussed with patient cancer risk factors and appropriate screenings for age  -Labs from previous visits were discussed with patient   -Discussed with patient diet and exercise and formulated a plan as needed  -An Advanced care plan was developed with the patient.  -Alcohol screening performed    -  Follow-up and Dispositions    · Return in about 1 year (around 1/24/2023) for [address], fasting follow up.          I have discussed the diagnosis with the patient and the intended plan as seen in the above orders. The patient understands and agrees with the plan. The patient has received an after-visit summary and questions were answered concerning future plans. Medication Side Effects and Warnings were discussed with patien  Patient Labs were reviewed and or requested  Patient Past Records were reviewed and or requested    There are no Patient Instructions on file for this visit.       Philip Santoyo MD

## 2022-01-24 NOTE — PROGRESS NOTES
Chief Complaint   Patient presents with    Annual Wellness Visit    Other     patient pulled muscle behind knee on right leg  cleaning yesterday    1. Have you been to the ER, urgent care clinic since your last visit? Hospitalized since your last visit? No    2. Have you seen or consulted any other health care providers outside of the [address] [place] [address] since your last visit? Include any pap smears or colon screening. Yes patient has seen dermatologist for basal cell carcinoma on top of head      3 most recent PHQ Screens 1/24/2022   Little interest or pleasure in doing things Not at all   Feeling down, depressed, irritable, or hopeless Not at all   Total Score PHQ 2 0       Fall Risk Assessment, last 12 mths 1/24/2022   Able to walk? Yes   Fall in past 12 months? 0   Do you feel unsteady? 0   Are you worried about falling 0   Number of falls in past 12 months -   Fall with injury?  -

## 2022-01-25 LAB
25(OH)D3+25(OH)D2 SERPL-MCNC: 53.7 NG/ML (ref 30–100)
ALBUMIN SERPL-MCNC: 4.4 G/DL (ref 3.7–4.7)
ALBUMIN/GLOB SERPL: 2.3 {RATIO} (ref 1.2–2.2)
ALP SERPL-CCNC: 66 IU/L (ref 44–121)
ALT SERPL-CCNC: 11 IU/L (ref 0–32)
AST SERPL-CCNC: 19 IU/L (ref 0–40)
BASOPHILS # BLD AUTO: 0 X10E3/UL (ref 0–0.2)
BASOPHILS NFR BLD AUTO: 0 %
BILIRUB SERPL-MCNC: 0.6 MG/DL (ref 0–1.2)
BUN SERPL-MCNC: 9 MG/DL (ref 8–27)
BUN/CREAT SERPL: 11 (ref 12–28)
CALCIUM SERPL-MCNC: 9 MG/DL (ref 8.7–10.3)
CHLORIDE SERPL-SCNC: 97 MMOL/L (ref 96–106)
CHOLEST SERPL-MCNC: 141 MG/DL (ref 100–199)
CO2 SERPL-SCNC: 26 MMOL/L (ref 20–29)
CREAT SERPL-MCNC: 0.79 MG/DL (ref 0.57–1)
EOSINOPHIL # BLD AUTO: 0 X10E3/UL (ref 0–0.4)
EOSINOPHIL NFR BLD AUTO: 1 %
ERYTHROCYTE [DISTWIDTH] IN BLOOD BY AUTOMATED COUNT: 12.5 % (ref 11.7–15.4)
GLOBULIN SER CALC-MCNC: 1.9 G/DL (ref 1.5–4.5)
GLUCOSE SERPL-MCNC: 93 MG/DL (ref 65–99)
HCT VFR BLD AUTO: 42.1 % (ref 34–46.6)
HCV AB S/CO SERPL IA: <0.1 S/CO RATIO (ref 0–0.9)
HCV AB SERPL QL IA: NORMAL
HDLC SERPL-MCNC: 73 MG/DL
HGB BLD-MCNC: 14.5 G/DL (ref 11.1–15.9)
IMM GRANULOCYTES # BLD AUTO: 0 X10E3/UL (ref 0–0.1)
IMM GRANULOCYTES NFR BLD AUTO: 0 %
LDLC SERPL CALC-MCNC: 53 MG/DL (ref 0–99)
LYMPHOCYTES # BLD AUTO: 1.2 X10E3/UL (ref 0.7–3.1)
LYMPHOCYTES NFR BLD AUTO: 17 %
MCH RBC QN AUTO: 31.5 PG (ref 26.6–33)
MCHC RBC AUTO-ENTMCNC: 34.4 G/DL (ref 31.5–35.7)
MCV RBC AUTO: 92 FL (ref 79–97)
MONOCYTES # BLD AUTO: 0.7 X10E3/UL (ref 0.1–0.9)
MONOCYTES NFR BLD AUTO: 11 %
NEUTROPHILS # BLD AUTO: 5.1 X10E3/UL (ref 1.4–7)
NEUTROPHILS NFR BLD AUTO: 71 %
PLATELET # BLD AUTO: 272 X10E3/UL (ref 150–450)
POTASSIUM SERPL-SCNC: 4.4 MMOL/L (ref 3.5–5.2)
PROT SERPL-MCNC: 6.3 G/DL (ref 6–8.5)
RBC # BLD AUTO: 4.6 X10E6/UL (ref 3.77–5.28)
SODIUM SERPL-SCNC: 136 MMOL/L (ref 134–144)
TRIGL SERPL-MCNC: 76 MG/DL (ref 0–149)
VLDLC SERPL CALC-MCNC: 15 MG/DL (ref 5–40)
WBC # BLD AUTO: 7.1 X10E3/UL (ref 3.4–10.8)

## 2022-02-04 ENCOUNTER — TELEPHONE (OUTPATIENT)
Dept: NEUROLOGY | Age: 77
End: 2022-02-04

## 2022-02-04 NOTE — TELEPHONE ENCOUNTER
Pt stated that Dr. Derrick Zee wants her to get her doppler and follow up appointment for the same day at the [address] [place] [address] for the month of October

## 2022-03-18 PROBLEM — F41.1 GENERALIZED ANXIETY DISORDER: Status: ACTIVE | Noted: 2020-09-03

## 2022-03-18 PROBLEM — I65.29 CAROTID ARTERY STENOSIS: Status: ACTIVE | Noted: 2020-09-03

## 2022-03-18 PROBLEM — R55 SYNCOPE: Status: ACTIVE | Noted: 2017-02-17

## 2022-03-18 PROBLEM — M85.80 OSTEOPENIA: Status: ACTIVE | Noted: 2020-09-03

## 2022-03-19 PROBLEM — R32 URINARY INCONTINENCE: Status: ACTIVE | Noted: 2017-02-18

## 2022-03-19 PROBLEM — E66.3 OVERWEIGHT WITH BODY MASS INDEX (BMI) OF 29 TO 29.9 IN ADULT: Status: ACTIVE | Noted: 2020-09-03

## 2022-03-19 PROBLEM — I48.0 PAROXYSMAL ATRIAL FIBRILLATION (HCC): Status: ACTIVE | Noted: 2020-09-03

## 2022-03-20 PROBLEM — E55.9 VITAMIN D DEFICIENCY: Status: ACTIVE | Noted: 2020-09-03

## 2022-03-20 PROBLEM — E78.00 PURE HYPERCHOLESTEROLEMIA: Status: ACTIVE | Noted: 2020-09-03

## 2022-05-27 ENCOUNTER — OFFICE VISIT (OUTPATIENT)
Dept: CARDIOLOGY CLINIC | Age: 77
End: 2022-05-27
Payer: MEDICARE

## 2022-05-27 VITALS
OXYGEN SATURATION: 97 % | WEIGHT: 161 LBS | SYSTOLIC BLOOD PRESSURE: 142 MMHG | BODY MASS INDEX: 28.53 KG/M2 | HEIGHT: 63 IN | DIASTOLIC BLOOD PRESSURE: 80 MMHG | HEART RATE: 72 BPM

## 2022-05-27 DIAGNOSIS — R55 SYNCOPE, UNSPECIFIED SYNCOPE TYPE: ICD-10-CM

## 2022-05-27 DIAGNOSIS — R06.02 SOB (SHORTNESS OF BREATH): Primary | ICD-10-CM

## 2022-05-27 DIAGNOSIS — I65.23 BILATERAL CAROTID ARTERY STENOSIS: ICD-10-CM

## 2022-05-27 DIAGNOSIS — E78.5 DYSLIPIDEMIA (HIGH LDL; LOW HDL): ICD-10-CM

## 2022-05-27 PROCEDURE — 93010 ELECTROCARDIOGRAM REPORT: CPT | Performed by: SPECIALIST

## 2022-05-27 PROCEDURE — 1101F PT FALLS ASSESS-DOCD LE1/YR: CPT | Performed by: SPECIALIST

## 2022-05-27 PROCEDURE — G8536 NO DOC ELDER MAL SCRN: HCPCS | Performed by: SPECIALIST

## 2022-05-27 PROCEDURE — G0463 HOSPITAL OUTPT CLINIC VISIT: HCPCS | Performed by: SPECIALIST

## 2022-05-27 PROCEDURE — G8419 CALC BMI OUT NRM PARAM NOF/U: HCPCS | Performed by: SPECIALIST

## 2022-05-27 PROCEDURE — 99214 OFFICE O/P EST MOD 30 MIN: CPT | Performed by: SPECIALIST

## 2022-05-27 PROCEDURE — G8427 DOCREV CUR MEDS BY ELIG CLIN: HCPCS | Performed by: SPECIALIST

## 2022-05-27 PROCEDURE — G8400 PT W/DXA NO RESULTS DOC: HCPCS | Performed by: SPECIALIST

## 2022-05-27 PROCEDURE — 93005 ELECTROCARDIOGRAM TRACING: CPT | Performed by: SPECIALIST

## 2022-05-27 PROCEDURE — G8510 SCR DEP NEG, NO PLAN REQD: HCPCS | Performed by: SPECIALIST

## 2022-05-27 PROCEDURE — 1090F PRES/ABSN URINE INCON ASSESS: CPT | Performed by: SPECIALIST

## 2022-05-27 PROCEDURE — 1123F ACP DISCUSS/DSCN MKR DOCD: CPT | Performed by: SPECIALIST

## 2022-05-27 NOTE — PATIENT INSTRUCTIONS

## 2022-05-27 NOTE — LETTER
5/27/2022    Patient: Daniel Valadez   YOB: 1945   Date of Visit: 5/27/2022     Marlon Dalton MD  [address] [place] [address] Rd  [address] [address]  [address] [facility] [address]    Dear Marlon Dalton MD,      Thank you for referring Ms. Desiree Santiago to [facility] for evaluation. My notes for this consultation are attached. If you have questions, please do not hesitate to call me. I look forward to following your patient along with you.       Sincerely,    Julio Alex MD

## 2022-05-27 NOTE — PROGRESS NOTES
CARDIOLOGY OFFICE NOTE    Vasile Mckenzie MD, [address], [place], [address] [facility]  Phone [phone]; Fax [phone]  Mobile [phone]   Voice Mail [phone]             ATTENTION:   This medical record was transcribed using an electronic medical records/speech recognition system. Although proofread, it may and can contain electronic, spelling and other errors. Corrections may be executed at a later time. Please feel free to contact us for any clarifications as needed. ICD-10-CM ICD-9-CM   1. SOB (shortness of breath)  R06.02 786.05   2. Bilateral carotid artery stenosis  I65.23 433.10     433.30   3. Dyslipidemia (high LDL; low HDL)  E78.5 272.4   4. Syncope, unspecified syncope type  R55 780.2            Roscoe Guillen is a 67 y.o. female with dyslipidemia referred for 6 month follow up. Cardiac risk factors: family history, dyslipidemia, post-menopausal  I have personally obtained the history from the patient. Marbin De Los Santos states she is doing great. Has not had any further episodes of syncope or presyncope. She is cutting her yard on a riding lawnmower but also uses in the dishes of push mower. She denies any chest pain or shortness of breath or anginal equivalents. We talked about aspirin today and I at her age. Told her that for primary prevention aspirin is now not recommended. She can reduce her aspirin if she wishes to to every other day and to stop it she does say that she occasionally has some bruising.            ACTIVE PROBLEM LIST     Patient Active Problem List    Diagnosis Date Noted    Paroxysmal atrial fibrillation ([facility] [address] 09/03/2020    Carotid artery stenosis 09/03/2020    Generalized anxiety disorder 09/03/2020    Pure hypercholesterolemia 09/03/2020    Osteopenia 09/03/2020    Overweight with body mass index (BMI) of 29 to 29.9 in adult 09/03/2020    Vitamin D deficiency 09/03/2020    Urinary incontinence 02/18/2017    Syncope 02/17/2017           PAST MEDICAL HISTORY     Past Medical History:   Diagnosis Date    Anxiety     Arrhythmia     possible AFIB while in hospital 2017    Arthritis     CAD (coronary artery disease)     carotid blockage     Fatigue     History of bone density study 2015 neg    Leg swelling     Low vitamin D level     Osteopenia     Polio     SOB (shortness of breath)            PAST SURGICAL HISTORY     Past Surgical History:   Procedure Laterality Date    HX BREAST LUMPECTOMY      HX COLONOSCOPY  03/28/2008    HX ORTHOPAEDIC  1978    NAVEEN BIOPSY BREAST STEREOTACTIC Right 1980    negative    MT BREAST SURGERY PROCEDURE UNLISTED  1975          ALLERGIES     Allergies   Allergen Reactions    E-Mycin [Erythromycin] Nausea Only    Sertraline Other (comments)     Severe nervousness/shaky/jittery           FAMILY HISTORY     Family History   Problem Relation Age of Onset    Dementia Mother     Ovarian Cancer Mother 80    Diabetes Mother     Heart Disease Father     Heart Attack Father     Cancer Paternal Uncle     negative for cardiac disease       SOCIAL HISTORY     Social History     Socioeconomic History    Marital status:    Tobacco Use    Smoking status: Never Smoker    Smokeless tobacco: Never Used   Vaping Use    Vaping Use: Never used   Substance and Sexual Activity    Alcohol use: No    Drug use: No    Sexual activity: Not Currently     Partners: Male     Birth control/protection: None         MEDICATIONS     Current Outpatient Medications   Medication Sig    pravastatin (PRAVACHOL) 40 mg tablet TAKE 1 TABLET AT BEDTIME    cholecalciferol (VITAMIN D3) 1,000 unit cap Take 1,000 Units by mouth daily.  aspirin 81 mg chewable tablet Take 1 Tab by mouth daily. No current facility-administered medications for this visit.        I have reviewed the nurses notes, vitals, problem list, allergy list, medical history, family, social history and medications. REVIEW OF SYMPTOMS   Pertinent positive per HPI  General: Pt denies excessive weight gain or loss. Pt is able to conduct ADL's  HEENT: Denies blurred vision, headaches, hearing loss, epistaxis and difficulty swallowing. Respiratory: Denies cough, congestion, shortness of breath, INFANTE, wheezing or stridor. Cardiovascular: Denies precordial pain, palpitations, edema or PND  Gastrointestinal: Denies poor appetite, indigestion, abdominal pain or blood in stool  Genitourinary: Denies hematuria, dysuria, increased urinary frequency  Musculoskeletal: Denies joint pain or swelling from muscles or joints  Neurologic: Denies tremor, paresthesias, headache, or sensory motor disturbance  Psychiatric: Denies confusion, insomnia, depression  Integumentray: Denies rash, itching or ulcers. Hematologic: Denies easy bruising, bleeding     PHYSICAL EXAMINATION      Vitals:    05/27/22 0940   BP: (!) 142/80   Pulse: 72   SpO2: 97%   Weight: 161 lb (73 kg)   Height: 5' 3\" (1.6 m)     General: Well developed, in no acute distress. HEENT: No jaundice, oral mucosa moist, no oral ulcers  Neck: Supple, no stiffness, no lymphadenopathy, supple  Heart: Regular rate and rhythm  Respiratory: Clear bilaterally x 4, no wheezing or rales  Extremities:  No edema, normal cap refill, no cyanosis. Musculoskeletal: No clubbing, no deformities  Neuro: A&Ox3, speech clear, gait stable, cooperative, no focal neurologic deficits        DIAGNOSTIC DATA     1. Echo   2/17/17- EF 60%   8/4/20-EF 64%. 2. Lipids   2/19/17- , HDL 79, .8, TG 86   9/19/17- , HDL 79, LDL 44, TG 44   5/2/18 - , HDL 67, LDL 60, TG 82   9/14/18- , HDL 71, LDL 59, TG 94   5/9/19- , HDL 75, LDL 61, TG 57   5/19/20- , HDL 71, LDL 64, TG 62   1/25/21- , HDL 78, LDL 59, TG 63   1/24/22- , HDL 73, LDL 53, TG 76         3.  Carotid Doppler   2/19/17- R 50-69%, L 0-49%   8/9/17- R 16-49%, L 1-15% 18- Jhonatan 16-49%   10/7/19- Jhonatan 16-49%   20- Jhonatan 10-49%    4. Myocardial perfusion study   17- (exercise stress) no ischemia     5. Loop   17 - 3/24/17- NSR         LABORATORY DATA            Lab Results   Component Value Date/Time    WBC 7.1 2022 08:13 AM    HGB 14.5 2022 08:13 AM    HCT 42.1 2022 08:13 AM    PLATELET 468  08:13 AM    MCV 92 2022 08:13 AM      Lab Results   Component Value Date/Time    Sodium 136 2022 08:13 AM    Potassium 4.4 2022 08:13 AM    Chloride 97 2022 08:13 AM    CO2 26 2022 08:13 AM    Anion gap 6 2020 06:13 PM    Glucose 93 2022 08:13 AM    BUN 9 2022 08:13 AM    Creatinine 0.79 2022 08:13 AM    BUN/Creatinine ratio 11 (L) 2022 08:13 AM    GFR est AA 84 2022 08:13 AM    GFR est non-AA 72 2022 08:13 AM    Calcium 9.0 2022 08:13 AM    Bilirubin, total 0.6 2022 08:13 AM    Alk. phosphatase 66 2022 08:13 AM    Protein, total 6.3 2022 08:13 AM    Albumin 4.4 2022 08:13 AM    Globulin 2.8 2020 06:13 PM    A-G Ratio 2.3 (H) 2022 08:13 AM    ALT (SGPT) 11 2022 08:13 AM        EC2022- normal sinus rhythm   ASSESSMENT/RECOMMENDATIONS:.      1. Syncope  -Syncopal episode most likely vasovagal    2. Dyslipidemia  -LDL is at goal followed by her primary care. Last LDL was 53  -Continue diet low in red meat    3. Hypertension  -She does have her daughter-in-law who is a nurse and her son who is a  check her blood pressure intermittently she says is always normal.  -Continue exercise 30 minutes 5 days a week  -Reduce sodium intake to 1.5 to 2 g.    3. Return in 1 year or PRN. Orders Placed This Encounter    AMB POC EKG ROUTINE /  LEADS, INTER & REP     Order Specific Question:   Reason for Exam:     Answer:   htn          Follow-up and Dispositions  ·   Return in about 1 year (around 2023).            I have discussed the diagnosis with  Danii Chairez and the intended plan as seen in the above orders. Questions were answered concerning future plans. I have discussed medication side effects and warnings with the patient as well. Thank you,  Che Cheney MD for involving me in the care of  Danii Chairez. Please do not hesitate to contact me for further questions/concerns. Vasile Jarquin MD, [facility]    Patient Care Team:  Che Cheney MD as PCP - General (Family Medicine)  Che Cheney MD as PCP - [facility] Empaneled Provider  Cherly Dubin, MD (Surgery General)  Lorayne Carrel, MD (Cardiovascular Disease Physician)  Sofía Jansen MD as Physician (Neurology)    [address]      [phone] / [phone] Fax

## 2022-05-27 NOTE — PROGRESS NOTES
Chief Complaint   Patient presents with    Follow-up     1year over due, Carotid Stenosis     Cholesterol Problem       Vitals:    05/27/22 0940   BP: (!) 142/80   Pulse: 72   Height: 5' 3\" (1.6 m)   Weight: 161 lb (73 kg)   SpO2: 97%         Chest pain: no    SOB: no    Palpitations: no    Dizziness: no    Swelling: yes, L ankle when on feet more. Refills: no      1. Have you been to the ER, urgent care clinic since your last visit? Hospitalized since your last visit? no    2. Have you sen or consulted other health care providers outside of the [facility] since your last visit?  (Include any pap smears or colon screening.)

## 2022-07-03 ENCOUNTER — APPOINTMENT (OUTPATIENT)
Dept: CT IMAGING | Age: 77
End: 2022-07-03
Attending: EMERGENCY MEDICINE
Payer: MEDICARE

## 2022-07-03 ENCOUNTER — HOSPITAL ENCOUNTER (EMERGENCY)
Age: 77
Discharge: HOME | End: 2022-07-03
Attending: EMERGENCY MEDICINE
Payer: MEDICARE

## 2022-07-03 VITALS
TEMPERATURE: 97.9 F | SYSTOLIC BLOOD PRESSURE: 133 MMHG | WEIGHT: 153 LBS | RESPIRATION RATE: 16 BRPM | HEART RATE: 76 BPM | OXYGEN SATURATION: 97 % | HEIGHT: 62 IN | BODY MASS INDEX: 28.16 KG/M2 | DIASTOLIC BLOOD PRESSURE: 57 MMHG

## 2022-07-03 DIAGNOSIS — R10.31 ABDOMINAL PAIN, RIGHT LOWER QUADRANT: Primary | ICD-10-CM

## 2022-07-03 LAB
ALBUMIN SERPL-MCNC: 4 G/DL (ref 3.5–5.2)
ALBUMIN/GLOB SERPL: 1.8 {RATIO} (ref 1.1–2.2)
ALP SERPL-CCNC: 60 U/L (ref 35–104)
ALT SERPL-CCNC: 14 U/L (ref 10–35)
ANION GAP SERPL CALC-SCNC: 11 MMOL/L (ref 5–15)
APPEARANCE UR: CLEAR
AST SERPL-CCNC: 20 U/L (ref 10–35)
BASOPHILS # BLD: 0 K/UL (ref 0–0.1)
BASOPHILS NFR BLD: 1 % (ref 0–1)
BILIRUB SERPL-MCNC: 0.3 MG/DL (ref 0.2–1)
BILIRUB UR QL: NEGATIVE
BUN SERPL-MCNC: 13 MG/DL (ref 8–23)
BUN/CREAT SERPL: 16 (ref 12–20)
CALCIUM SERPL-MCNC: 9.3 MG/DL (ref 8.8–10.2)
CHLORIDE SERPL-SCNC: 102 MMOL/L (ref 98–107)
CO2 SERPL-SCNC: 28 MMOL/L (ref 22–29)
COLOR UR: NORMAL
CREAT SERPL-MCNC: 0.83 MG/DL (ref 0.5–0.9)
DIFFERENTIAL METHOD BLD: NORMAL
EOSINOPHIL # BLD: 0.1 K/UL (ref 0–0.4)
EOSINOPHIL NFR BLD: 1 % (ref 0–7)
ERYTHROCYTE [DISTWIDTH] IN BLOOD BY AUTOMATED COUNT: 12.4 % (ref 11.5–14.5)
GLOBULIN SER CALC-MCNC: 2.2 G/DL (ref 2–4)
GLUCOSE SERPL-MCNC: 122 MG/DL (ref 65–100)
GLUCOSE UR STRIP.AUTO-MCNC: NEGATIVE MG/DL
HCT VFR BLD AUTO: 40.2 % (ref 35–47)
HGB BLD-MCNC: 13.7 G/DL (ref 11.5–16)
HGB UR QL STRIP: NEGATIVE
IMM GRANULOCYTES # BLD AUTO: 0 K/UL (ref 0–0.04)
IMM GRANULOCYTES NFR BLD AUTO: 0 % (ref 0–0.5)
KETONES UR QL STRIP.AUTO: NEGATIVE MG/DL
LEUKOCYTE ESTERASE UR QL STRIP.AUTO: NEGATIVE
LIPASE SERPL-CCNC: 23 U/L (ref 13–60)
LYMPHOCYTES # BLD: 1.2 K/UL (ref 0.8–3.5)
LYMPHOCYTES NFR BLD: 22 % (ref 12–49)
MCH RBC QN AUTO: 31.3 PG (ref 26–34)
MCHC RBC AUTO-ENTMCNC: 34.1 G/DL (ref 30–36.5)
MCV RBC AUTO: 91.8 FL (ref 80–99)
MONOCYTES # BLD: 0.6 K/UL (ref 0–1)
MONOCYTES NFR BLD: 10 % (ref 5–13)
NEUTS SEG # BLD: 3.7 K/UL (ref 1.8–8)
NEUTS SEG NFR BLD: 66 % (ref 32–75)
NITRITE UR QL STRIP.AUTO: NEGATIVE
NRBC # BLD: 0 K/UL (ref 0–0.01)
NRBC BLD-RTO: 0 PER 100 WBC
PH UR STRIP: 6.5 [PH] (ref 5–8)
PLATELET # BLD AUTO: 269 K/UL (ref 150–400)
PMV BLD AUTO: 10 FL (ref 8.9–12.9)
POTASSIUM SERPL-SCNC: 4 MMOL/L (ref 3.5–5.1)
PROT SERPL-MCNC: 6.2 G/DL (ref 6.4–8.3)
PROT UR STRIP-MCNC: NEGATIVE MG/DL
RBC # BLD AUTO: 4.38 M/UL (ref 3.8–5.2)
SODIUM SERPL-SCNC: 141 MMOL/L (ref 136–145)
SP GR UR REFRACTOMETRY: 1.02 (ref 1–1.03)
UR CULT HOLD, URHOLD: NORMAL
UROBILINOGEN UR QL STRIP.AUTO: 0.2 EU/DL (ref 0.2–1)
WBC # BLD AUTO: 5.6 K/UL (ref 3.6–11)

## 2022-07-03 PROCEDURE — 99285 EMERGENCY DEPT VISIT HI MDM: CPT

## 2022-07-03 PROCEDURE — 83690 ASSAY OF LIPASE: CPT

## 2022-07-03 PROCEDURE — 74011250636 HC RX REV CODE- 250/636: Performed by: EMERGENCY MEDICINE

## 2022-07-03 PROCEDURE — 36415 COLL VENOUS BLD VENIPUNCTURE: CPT

## 2022-07-03 PROCEDURE — 74177 CT ABD & PELVIS W/CONTRAST: CPT

## 2022-07-03 PROCEDURE — 81003 URINALYSIS AUTO W/O SCOPE: CPT

## 2022-07-03 PROCEDURE — 80053 COMPREHEN METABOLIC PANEL: CPT

## 2022-07-03 PROCEDURE — 74011000636 HC RX REV CODE- 636: Performed by: EMERGENCY MEDICINE

## 2022-07-03 PROCEDURE — 85025 COMPLETE CBC W/AUTO DIFF WBC: CPT

## 2022-07-03 RX ADMIN — SODIUM CHLORIDE 1000 ML: 9 INJECTION, SOLUTION INTRAVENOUS at 16:42

## 2022-07-03 RX ADMIN — IOPAMIDOL 100 ML: 755 INJECTION, SOLUTION INTRAVENOUS at 16:44

## 2022-07-03 NOTE — ED PROVIDER NOTES
Patient is here for abdominal pain. She states that for the past week or 2 she has been having her main pain in the right flank right lower quadrant area. It is sharp occasionally with nausea but no vomiting. Has had some decreased appetite. No history of abdominal surgeries. No history of diarrhea constipation dysuria urgency frequency. No history of abdominal medical problems such as Crohn's disease or bowel disease inflammatory bowel disease. Has had no other symptoms with this. Family brought her in today to get evaluated for possible appendicitis.            Past Medical History:   Diagnosis Date    Anxiety     Arrhythmia     possible AFIB while in hospital 2017    Arthritis     CAD (coronary artery disease)     carotid blockage     Fatigue     History of bone density study 2015 neg    Leg swelling     Low vitamin D level     Osteopenia     Polio     left leg shorter    SOB (shortness of breath)        Past Surgical History:   Procedure Laterality Date    HX BREAST LUMPECTOMY      HX COLONOSCOPY  03/28/2008    HX ORTHOPAEDIC  1978    NAVEEN BIOPSY BREAST STEREOTACTIC Right 1980    negative    [place] BREAST SURGERY PROCEDURE UNLISTED  1975         Family History:   Problem Relation Age of Onset    Dementia Mother     Ovarian Cancer Mother 80    Diabetes Mother     Heart Disease Father     Heart Attack Father     Cancer Paternal Uncle        Social History     Socioeconomic History    Marital status:      Spouse name: Not on file    Number of children: Not on file    Years of education: Not on file    Highest education level: Not on file   Occupational History    Not on file   Tobacco Use    Smoking status: Never Smoker    Smokeless tobacco: Never Used   Vaping Use    Vaping Use: Never used   Substance and Sexual Activity    Alcohol use: No    Drug use: No    Sexual activity: Not Currently     Partners: Male     Birth control/protection: None   Other Topics Concern    Not on file   Social History Narrative    Not on file     Social Determinants of Health     Financial Resource Strain:     Difficulty of Paying Living Expenses: Not on file   Food Insecurity:     Worried About Running Out of Food in the Last Year: Not on file    Sade of Food in the Last Year: Not on file   Transportation Needs:     Lack of Transportation (Medical): Not on file    Lack of Transportation (Non-Medical): Not on file   Physical Activity:     Days of Exercise per Week: Not on file    Minutes of Exercise per Session: Not on file   Stress:     Feeling of Stress : Not on file   Social Connections:     Frequency of Communication with Friends and Family: Not on file    Frequency of Social Gatherings with Friends and Family: Not on file    Attends Zoroastrianism Services: Not on file    Active Member of [address] [place] [address] [employer] or Organizations: Not on file    Attends Club or Organization Meetings: Not on file    Marital Status: Not on file   Intimate Partner Violence:     Fear of Current or Ex-Partner: Not on file    Emotionally Abused: Not on file    Physically Abused: Not on file    Sexually Abused: Not on file   Housing Stability:     Unable to Pay for Housing in the Last Year: Not on file    Number of [place] in the Last Year: Not on file    Unstable Housing in the Last Year: Not on file         ALLERGIES: E-mycin [erythromycin] and Sertraline    Review of Systems   Constitutional: Negative for chills, diaphoresis, fatigue and fever. HENT: Negative for congestion, rhinorrhea and sore throat. Eyes: Negative for discharge and itching. Respiratory: Negative for cough and shortness of breath. Cardiovascular: Negative for chest pain, palpitations and leg swelling. Gastrointestinal: Positive for abdominal pain and nausea. Negative for constipation, diarrhea and vomiting. Genitourinary: Negative for dysuria, flank pain and hematuria.    Musculoskeletal: Negative for arthralgias, back pain, gait problem, joint swelling, myalgias, neck pain and neck stiffness. Skin: Negative for color change, pallor, rash and wound. Neurological: Negative for dizziness, tremors, light-headedness, numbness and headaches. Vitals:    07/03/22 1625 07/03/22 1655 07/03/22 1732 07/03/22 1802   BP: (!) 124/54 125/62 124/78 132/66   Pulse:       Resp:       Temp:       SpO2: 97% 96% 100% 98%   Weight:       Height:                Physical Exam  Vitals and nursing note reviewed. Exam conducted with a chaperone present. Constitutional:       General: She is not in acute distress. Appearance: She is well-developed and normal weight. She is not ill-appearing, toxic-appearing or diaphoretic. HENT:      Head: Normocephalic and atraumatic. Mouth/Throat:      Mouth: Mucous membranes are moist.      Pharynx: Oropharynx is clear. No pharyngeal swelling or oropharyngeal exudate. Eyes:      General: No scleral icterus. Extraocular Movements: Extraocular movements intact. Pupils: Pupils are equal, round, and reactive to light. Cardiovascular:      Rate and Rhythm: Normal rate and regular rhythm. Heart sounds: Normal heart sounds. Pulmonary:      Effort: Pulmonary effort is normal.      Breath sounds: Normal breath sounds. Abdominal:      General: Abdomen is flat. Bowel sounds are normal.      Palpations: Abdomen is soft. Tenderness: There is abdominal tenderness in the right lower quadrant. There is no guarding or rebound. Positive signs include McBurney's sign. Negative signs include Goldberg's sign and Rovsing's sign. Skin:     General: Skin is warm and dry. Capillary Refill: Capillary refill takes less than 2 seconds. Coloration: Skin is not cyanotic, jaundiced, mottled or pale. Findings: No erythema or rash. Neurological:      General: No focal deficit present. Mental Status: She is alert and oriented to person, place, and time.    Psychiatric:         Mood and Affect: Mood normal.         Behavior: Behavior normal.          [facility]  ED Course as of 07/03/22 1820   Sun Jul 03, 2022   1626 CBC WITH AUTOMATED DIFF:    WBC 5.6   RBC 4.38   HGB 13.7   HCT 40.2   MCV 91.8   MCH 31.3   MCHC 34.1   RDW 12.4   PLATELET 999   MPV 43.4   NRBC 0.0   ABSOLUTE NRBC 0.00   NEUTROPHILS 66   LYMPHOCYTES 22   MONOCYTES 10   EOSINOPHILS 1   BASOPHILS 1   IMMATURE GRANULOCYTES 0   ABS. NEUTROPHILS 3.7   ABS. LYMPHOCYTES 1.2   ABS. MONOCYTES 0.6   ABS. EOSINOPHILS 0.1   ABS. BASOPHILS 0.0   ABS. IMM. GRANS. 0.0   DF AUTOMATED [AF]   1626 Patient examined. With mild hypertension otherwise vital signs normal.  Patient in no acute distress. Does have tenderness in the right lower quadrant of her abdomen. Nonperitoneal at this time. Will get CT scan to evaluate. [AF]   7156 METABOLIC PANEL, COMPREHENSIVE(!):    Sodium 141   Potassium 4.0   Chloride 102   CO2 28   Anion gap 11   Glucose 122(!)   BUN 13   Creatinine 0.83   BUN/Creatinine ratio 16   GFR est AA >60   GFR est non-AA >60   Calcium 9.3   Bilirubin, total 0.3   ALT 14   AST 20   Alk. phosphatase 60   Protein, total 6.2(!)   Albumin 4.0   Globulin 2.2   A-G Ratio 1.8 [AF]   1654 LIPASE:    Lipase 23 [AF]   1815 URINALYSIS W/ RFLX MICROSCOPIC:    Color YELLOW/STRAW   Appearance CLEAR   Specific gravity 1.018   pH (UA) 6.5   Protein Negative   Glucose Negative   Ketone Negative   Bilirubin Negative   Blood Negative   Urobilinogen 0.2   Nitrites Negative   Leukocyte Esterase Negative [AF]   7580 CT ABD PELV W CONT [AF]   1819 Patient rechecked. Patient no acute distress. Vital signs normal.  Patient feels good and wants to go home. Will discharge patient to home with instructions to follow-up with primary care doctor.   Patient discharged home in the care of her family who appears reliable and concern for patient's wellbeing. [AF]      ED Course User Index  [AF] Anna Funes DO       Procedures

## 2022-07-03 NOTE — ED TRIAGE NOTES
Pt arrives in the ED with complaints of RLQ pain x 2 weeks with rebound tenderness. Pt reports nausea. Denies vomiting. Pt still has appendix.

## 2022-09-27 ENCOUNTER — OFFICE VISIT (OUTPATIENT)
Dept: FAMILY MEDICINE CLINIC | Age: 77
End: 2022-09-27
Payer: MEDICARE

## 2022-09-27 VITALS
BODY MASS INDEX: 27.97 KG/M2 | HEIGHT: 62 IN | SYSTOLIC BLOOD PRESSURE: 136 MMHG | RESPIRATION RATE: 16 BRPM | OXYGEN SATURATION: 98 % | HEART RATE: 77 BPM | TEMPERATURE: 97.3 F | WEIGHT: 152 LBS | DIASTOLIC BLOOD PRESSURE: 68 MMHG

## 2022-09-27 DIAGNOSIS — T78.40XA ALLERGY, INITIAL ENCOUNTER: Primary | ICD-10-CM

## 2022-09-27 DIAGNOSIS — H69.92 DISORDER OF LEFT EUSTACHIAN TUBE: ICD-10-CM

## 2022-09-27 PROCEDURE — G8432 DEP SCR NOT DOC, RNG: HCPCS | Performed by: STUDENT IN AN ORGANIZED HEALTH CARE EDUCATION/TRAINING PROGRAM

## 2022-09-27 PROCEDURE — 1090F PRES/ABSN URINE INCON ASSESS: CPT | Performed by: STUDENT IN AN ORGANIZED HEALTH CARE EDUCATION/TRAINING PROGRAM

## 2022-09-27 PROCEDURE — G8400 PT W/DXA NO RESULTS DOC: HCPCS | Performed by: STUDENT IN AN ORGANIZED HEALTH CARE EDUCATION/TRAINING PROGRAM

## 2022-09-27 PROCEDURE — G8536 NO DOC ELDER MAL SCRN: HCPCS | Performed by: STUDENT IN AN ORGANIZED HEALTH CARE EDUCATION/TRAINING PROGRAM

## 2022-09-27 PROCEDURE — 99213 OFFICE O/P EST LOW 20 MIN: CPT | Performed by: STUDENT IN AN ORGANIZED HEALTH CARE EDUCATION/TRAINING PROGRAM

## 2022-09-27 PROCEDURE — G8427 DOCREV CUR MEDS BY ELIG CLIN: HCPCS | Performed by: STUDENT IN AN ORGANIZED HEALTH CARE EDUCATION/TRAINING PROGRAM

## 2022-09-27 PROCEDURE — 1123F ACP DISCUSS/DSCN MKR DOCD: CPT | Performed by: STUDENT IN AN ORGANIZED HEALTH CARE EDUCATION/TRAINING PROGRAM

## 2022-09-27 PROCEDURE — G8417 CALC BMI ABV UP PARAM F/U: HCPCS | Performed by: STUDENT IN AN ORGANIZED HEALTH CARE EDUCATION/TRAINING PROGRAM

## 2022-09-27 PROCEDURE — 1101F PT FALLS ASSESS-DOCD LE1/YR: CPT | Performed by: STUDENT IN AN ORGANIZED HEALTH CARE EDUCATION/TRAINING PROGRAM

## 2022-09-27 RX ORDER — FLUTICASONE PROPIONATE 50 MCG
2 SPRAY, SUSPENSION (ML) NASAL DAILY
Qty: 1 EACH | Refills: 5 | Status: SHIPPED | OUTPATIENT
Start: 2022-09-27

## 2022-09-27 NOTE — PROGRESS NOTES
Subjective:     Chief Complaint   Patient presents with    Ear Pain     Left ear      HPI:  Zeke Bardales is a 67 y.o. female with left ear pain. This is a recurring issue for her. Denies any fever, or loss of hearing. She does get rhinitis, and some congestion occasionally. Denies sinus pressure. Patient Active Problem List    Diagnosis    Paroxysmal atrial fibrillation [facility])     Sees Dr. Jb Foley annually. No current symptoms. Carotid artery stenosis    Generalized anxiety disorder    Pure hypercholesterolemia    Osteopenia    Overweight with body mass index (BMI) of 29 to 29.9 in adult    Vitamin D deficiency    Urinary incontinence    Syncope     Past Medical History:   Diagnosis Date    Anxiety     Arrhythmia     possible AFIB while in hospital 2017    Arthritis     CAD (coronary artery disease)     carotid blockage     Fatigue     History of bone density study 2015 neg    Leg swelling     Low vitamin D level     Osteopenia     Polio     left leg shorter    SOB (shortness of breath)      Family History   Problem Relation Age of Onset    Dementia Mother     Ovarian Cancer Mother 80    Diabetes Mother     Heart Disease Father     Heart Attack Father     Cancer Paternal Uncle       reports that she has never smoked. She has never used smokeless tobacco. She reports that she does not drink alcohol and does not use drugs. Current Outpatient Medications on File Prior to Visit   Medication Sig Dispense Refill    pravastatin (PRAVACHOL) 40 mg tablet TAKE 1 TABLET AT BEDTIME 90 Tablet 3    cholecalciferol (VITAMIN D3) 25 mcg (1,000 unit) cap Take 1,000 Units by mouth daily. No current facility-administered medications on file prior to visit. Allergies   Allergen Reactions    E-Mycin [Erythromycin] Nausea Only    Sertraline Other (comments)     Severe nervousness/shaky/jittery      Review of Systems   All other systems reviewed and are negative.     Objective:     Vitals:    09/27/22 1043 BP: 136/68   Pulse: 77   Resp: 16   Temp: 97.3 °F (36.3 °C)   TempSrc: Axillary   SpO2: 98%   Weight: 152 lb (68.9 kg)   Height: 5' 2\" (1.575 m)     Physical Exam  Vitals reviewed. Constitutional:       Appearance: Normal appearance. HENT:      Head: Normocephalic and atraumatic. Right Ear: Tympanic membrane normal.      Left Ear: Tympanic membrane normal.   Cardiovascular:      Rate and Rhythm: Normal rate. Pulmonary:      Effort: Pulmonary effort is normal.   Neurological:      Mental Status: She is alert and oriented to person, place, and time. Psychiatric:         Behavior: Behavior normal.          Assessment/Plan:       Diagnoses and all orders for this visit:    1. Allergy, initial encounter  -     fluticasone propionate (FLONASE) 50 mcg/actuation nasal spray; 2 Sprays by Both Nostrils route daily. 2. Disorder of left eustachian tube  -     fluticasone propionate (FLONASE) 50 mcg/actuation nasal spray; 2 Sprays by Both Nostrils route daily. I suspect her left-sided ear pain pain is due to eustachian tube dysfunction. Flonase ordered, advised to take allergy medication if symptoms do not improve eventually Flonase. She may benefit from a decongestion as well.   Follow-up as needed       Savi Young MD

## 2022-09-27 NOTE — PROGRESS NOTES
Chief Complaint   Patient presents with    Ear Pain     Left ear      Visit Vitals  /68 (BP 1 Location: Left upper arm, BP Patient Position: Sitting)   Pulse 77   Temp 97.3 °F (36.3 °C) (Axillary)   Resp 16   Ht 5' 2\" (1.575 m)   Wt 152 lb (68.9 kg)   SpO2 98%   BMI 27.80 kg/m²     . 1. \"Have you been to the ER, urgent care clinic since your last visit? Hospitalized since your last visit? \" No    2. \"Have you seen or consulted any other health care providers outside of the [address] [place] [address] since your last visit? \" No     3. For patients aged 39-70: Has the patient had a colonoscopy / FIT/ Cologuard? No      If the patient is female:    4. For patients aged 41-77: Has the patient had a mammogram within the past 2 years? No      5. For patients aged 21-65: Has the patient had a pap smear?  No

## 2022-10-11 ENCOUNTER — OFFICE VISIT (OUTPATIENT)
Dept: NEUROLOGY | Age: 77
End: 2022-10-11

## 2022-10-11 ENCOUNTER — TRANSCRIBE ORDER (OUTPATIENT)
Dept: SCHEDULING | Age: 77
End: 2022-10-11

## 2022-10-11 VITALS
RESPIRATION RATE: 16 BRPM | HEART RATE: 82 BPM | SYSTOLIC BLOOD PRESSURE: 136 MMHG | DIASTOLIC BLOOD PRESSURE: 84 MMHG | OXYGEN SATURATION: 97 %

## 2022-10-11 DIAGNOSIS — I65.23 BILATERAL CAROTID ARTERY STENOSIS: Primary | ICD-10-CM

## 2022-10-11 DIAGNOSIS — Z12.31 VISIT FOR SCREENING MAMMOGRAM: Primary | ICD-10-CM

## 2022-10-11 PROCEDURE — G8400 PT W/DXA NO RESULTS DOC: HCPCS | Performed by: PSYCHIATRY & NEUROLOGY

## 2022-10-11 PROCEDURE — G8427 DOCREV CUR MEDS BY ELIG CLIN: HCPCS | Performed by: PSYCHIATRY & NEUROLOGY

## 2022-10-11 PROCEDURE — 1101F PT FALLS ASSESS-DOCD LE1/YR: CPT | Performed by: PSYCHIATRY & NEUROLOGY

## 2022-10-11 PROCEDURE — G8432 DEP SCR NOT DOC, RNG: HCPCS | Performed by: PSYCHIATRY & NEUROLOGY

## 2022-10-11 PROCEDURE — G8536 NO DOC ELDER MAL SCRN: HCPCS | Performed by: PSYCHIATRY & NEUROLOGY

## 2022-10-11 PROCEDURE — 1123F ACP DISCUSS/DSCN MKR DOCD: CPT | Performed by: PSYCHIATRY & NEUROLOGY

## 2022-10-11 PROCEDURE — 1090F PRES/ABSN URINE INCON ASSESS: CPT | Performed by: PSYCHIATRY & NEUROLOGY

## 2022-10-11 PROCEDURE — 99213 OFFICE O/P EST LOW 20 MIN: CPT | Performed by: PSYCHIATRY & NEUROLOGY

## 2022-10-11 PROCEDURE — G8417 CALC BMI ABV UP PARAM F/U: HCPCS | Performed by: PSYCHIATRY & NEUROLOGY

## 2022-10-11 NOTE — PROGRESS NOTES
[facility] and [address] at [facility] at [address] [facility], [address] [facility] [address] [facility], [address] [place] [address]   [phone]              Chief Complaint   Patient presents with    Carotid Artery Stenosis     Doppler completed this morning     Current Outpatient Medications   Medication Sig Dispense Refill    fluticasone propionate (FLONASE) 50 mcg/actuation nasal spray 2 Sprays by Both Nostrils route daily. 1 Each 5    pravastatin (PRAVACHOL) 40 mg tablet TAKE 1 TABLET AT BEDTIME 90 Tablet 3    cholecalciferol (VITAMIN D3) 25 mcg (1,000 unit) cap Take 1,000 Units by mouth daily. Allergies   Allergen Reactions    E-Mycin [Erythromycin] Nausea Only    Sertraline Other (comments)     Severe nervousness/shaky/jittery      Social History     Tobacco Use    Smoking status: Never    Smokeless tobacco: Never   Vaping Use    Vaping Use: Never used   Substance Use Topics    Alcohol use: No    Drug use: No     48-year-old lady returns today for follow-up. This is her yearly visit. She was last seen right at a year ago. She has mild carotid stenosis. She had her repeat Doppler this morning demonstrating stable 16-49% stenosis. Office visit with Dr. Quinten Mary her for her Medicare wellness exam dated January 24 of this year where her blood pressure was slightly elevated otherwise examination unremarkable. She had her vaccinations and had laboratory analyses sent    Laboratory analysis July 3, 2022  CBC normal  Metabolic panel unremarkable except for slightly high glucose  Lipase normal  Urinalysis negative    Cardiology visit in May where she was said to be doing great with no episodes of syncope or presyncope. Syncope was felt to be vasovagal.  LDL was at goal.  She was to return yearly    Emergency department visit July 3, 2022 for abdominal pain. She was evaluated and released.     CT of the abdomen and pelvis from that same date unremarkable    Carotid Doppler done this morning again shows 16-49% stenosis    Today she has no new complaint. She has not had any focal neurologic deficits. No visual symptoms. S    Examination  Visit Vitals  /84 (BP 1 Location: Left upper arm, BP Patient Position: Sitting, BP Cuff Size: Adult)   Pulse 82   Resp 16   SpO2 97%     She is awake alert and oriented. Speech and language normal.  Cranial nerves are intact. Strength is full. No ataxia. Symmetric reflexes. Impression/Plan  Mild carotid stenosis  Continue yearly checks  Follow-up October 2023 with Doppler and appointment with me the same day after    Smiley Guerra MD        This note was created using voice recognition software. Despite editing, there may be syntax errors.

## 2022-10-28 ENCOUNTER — HOSPITAL ENCOUNTER (OUTPATIENT)
Dept: MAMMOGRAPHY | Age: 77
Discharge: HOME | End: 2022-10-28
Payer: MEDICARE

## 2022-10-28 DIAGNOSIS — Z12.31 SCREENING MAMMOGRAM FOR HIGH-RISK PATIENT: ICD-10-CM

## 2022-10-28 PROCEDURE — 77063 BREAST TOMOSYNTHESIS BI: CPT

## 2022-12-05 ENCOUNTER — HOSPITAL ENCOUNTER (EMERGENCY)
Dept: MRI IMAGING | Age: 77
Discharge: HOME | End: 2022-12-05
Attending: STUDENT IN AN ORGANIZED HEALTH CARE EDUCATION/TRAINING PROGRAM
Payer: MEDICARE

## 2022-12-05 ENCOUNTER — HOSPITAL ENCOUNTER (EMERGENCY)
Age: 77
Discharge: HOME | End: 2022-12-05
Attending: EMERGENCY MEDICINE
Payer: MEDICARE

## 2022-12-05 ENCOUNTER — APPOINTMENT (OUTPATIENT)
Dept: CT IMAGING | Age: 77
End: 2022-12-05
Payer: MEDICARE

## 2022-12-05 VITALS
RESPIRATION RATE: 16 BRPM | WEIGHT: 150 LBS | OXYGEN SATURATION: 98 % | DIASTOLIC BLOOD PRESSURE: 70 MMHG | TEMPERATURE: 98 F | HEIGHT: 64 IN | SYSTOLIC BLOOD PRESSURE: 142 MMHG | BODY MASS INDEX: 25.61 KG/M2 | HEART RATE: 74 BPM

## 2022-12-05 DIAGNOSIS — R42 VERTIGO: Primary | ICD-10-CM

## 2022-12-05 LAB
ALBUMIN SERPL-MCNC: 3.7 G/DL (ref 3.5–5)
ALBUMIN/GLOB SERPL: 1.4 {RATIO} (ref 1.1–2.2)
ALP SERPL-CCNC: 67 U/L (ref 45–117)
ALT SERPL-CCNC: 20 U/L (ref 12–78)
ANION GAP SERPL CALC-SCNC: 6 MMOL/L (ref 5–15)
APPEARANCE UR: CLEAR
AST SERPL-CCNC: 14 U/L (ref 15–37)
BACTERIA URNS QL MICRO: NEGATIVE /HPF
BASOPHILS # BLD: 0 K/UL (ref 0–0.1)
BASOPHILS NFR BLD: 1 % (ref 0–1)
BILIRUB SERPL-MCNC: 0.4 MG/DL (ref 0.2–1)
BILIRUB UR QL: NEGATIVE
BUN SERPL-MCNC: 14 MG/DL (ref 6–20)
BUN/CREAT SERPL: 19 (ref 12–20)
CALCIUM SERPL-MCNC: 9 MG/DL (ref 8.5–10.1)
CHLORIDE SERPL-SCNC: 105 MMOL/L (ref 97–108)
CO2 SERPL-SCNC: 29 MMOL/L (ref 21–32)
COLOR UR: NORMAL
COMMENT, HOLDF: NORMAL
CREAT SERPL-MCNC: 0.75 MG/DL (ref 0.55–1.02)
DIFFERENTIAL METHOD BLD: NORMAL
EOSINOPHIL # BLD: 0.1 K/UL (ref 0–0.4)
EOSINOPHIL NFR BLD: 2 % (ref 0–7)
EPITH CASTS URNS QL MICRO: NORMAL /LPF
ERYTHROCYTE [DISTWIDTH] IN BLOOD BY AUTOMATED COUNT: 12.5 % (ref 11.5–14.5)
GLOBULIN SER CALC-MCNC: 2.7 G/DL (ref 2–4)
GLUCOSE SERPL-MCNC: 135 MG/DL (ref 65–100)
GLUCOSE UR STRIP.AUTO-MCNC: NEGATIVE MG/DL
HCT VFR BLD AUTO: 44.3 % (ref 35–47)
HGB BLD-MCNC: 14.7 G/DL (ref 11.5–16)
HGB UR QL STRIP: NEGATIVE
HYALINE CASTS URNS QL MICRO: NORMAL /LPF (ref 0–2)
IMM GRANULOCYTES # BLD AUTO: 0 K/UL (ref 0–0.04)
IMM GRANULOCYTES NFR BLD AUTO: 0 % (ref 0–0.5)
KETONES UR QL STRIP.AUTO: NEGATIVE MG/DL
LEUKOCYTE ESTERASE UR QL STRIP.AUTO: NEGATIVE
LYMPHOCYTES # BLD: 1.6 K/UL (ref 0.8–3.5)
LYMPHOCYTES NFR BLD: 28 % (ref 12–49)
MAGNESIUM SERPL-MCNC: 2.1 MG/DL (ref 1.6–2.4)
MCH RBC QN AUTO: 31.5 PG (ref 26–34)
MCHC RBC AUTO-ENTMCNC: 33.2 G/DL (ref 30–36.5)
MCV RBC AUTO: 94.9 FL (ref 80–99)
MONOCYTES # BLD: 0.5 K/UL (ref 0–1)
MONOCYTES NFR BLD: 9 % (ref 5–13)
NEUTS SEG # BLD: 3.3 K/UL (ref 1.8–8)
NEUTS SEG NFR BLD: 60 % (ref 32–75)
NITRITE UR QL STRIP.AUTO: NEGATIVE
NRBC # BLD: 0 K/UL (ref 0–0.01)
NRBC BLD-RTO: 0 PER 100 WBC
PH UR STRIP: 6 [PH] (ref 5–8)
PLATELET # BLD AUTO: 286 K/UL (ref 150–400)
PMV BLD AUTO: 10.2 FL (ref 8.9–12.9)
POTASSIUM SERPL-SCNC: 4 MMOL/L (ref 3.5–5.1)
PROT SERPL-MCNC: 6.4 G/DL (ref 6.4–8.2)
PROT UR STRIP-MCNC: NEGATIVE MG/DL
RBC # BLD AUTO: 4.67 M/UL (ref 3.8–5.2)
RBC #/AREA URNS HPF: NORMAL /HPF (ref 0–5)
SAMPLES BEING HELD,HOLD: NORMAL
SODIUM SERPL-SCNC: 140 MMOL/L (ref 136–145)
SP GR UR REFRACTOMETRY: 1.01 (ref 1–1.03)
TROPONIN-HIGH SENSITIVITY: 5 NG/L (ref 0–51)
UR CULT HOLD, URHOLD: NORMAL
UROBILINOGEN UR QL STRIP.AUTO: 0.2 EU/DL (ref 0.2–1)
WBC # BLD AUTO: 5.5 K/UL (ref 3.6–11)
WBC URNS QL MICRO: NORMAL /HPF (ref 0–4)

## 2022-12-05 PROCEDURE — 74011250636 HC RX REV CODE- 250/636: Performed by: STUDENT IN AN ORGANIZED HEALTH CARE EDUCATION/TRAINING PROGRAM

## 2022-12-05 PROCEDURE — 74011250636 HC RX REV CODE- 250/636

## 2022-12-05 PROCEDURE — 81001 URINALYSIS AUTO W/SCOPE: CPT

## 2022-12-05 PROCEDURE — 70496 CT ANGIOGRAPHY HEAD: CPT

## 2022-12-05 PROCEDURE — 74011000636 HC RX REV CODE- 636: Performed by: STUDENT IN AN ORGANIZED HEALTH CARE EDUCATION/TRAINING PROGRAM

## 2022-12-05 PROCEDURE — 70551 MRI BRAIN STEM W/O DYE: CPT

## 2022-12-05 PROCEDURE — 93005 ELECTROCARDIOGRAM TRACING: CPT

## 2022-12-05 PROCEDURE — 70450 CT HEAD/BRAIN W/O DYE: CPT

## 2022-12-05 PROCEDURE — 85025 COMPLETE CBC W/AUTO DIFF WBC: CPT

## 2022-12-05 PROCEDURE — 84484 ASSAY OF TROPONIN QUANT: CPT

## 2022-12-05 PROCEDURE — 80053 COMPREHEN METABOLIC PANEL: CPT

## 2022-12-05 PROCEDURE — 83735 ASSAY OF MAGNESIUM: CPT

## 2022-12-05 PROCEDURE — 99285 EMERGENCY DEPT VISIT HI MDM: CPT

## 2022-12-05 RX ORDER — MECLIZINE HYDROCHLORIDE 25 MG/1
25 TABLET ORAL
Qty: 20 TABLET | Refills: 0 | Status: SHIPPED | OUTPATIENT
Start: 2022-12-05

## 2022-12-05 RX ORDER — MECLIZINE HCL 12.5 MG 12.5 MG/1
12.5 TABLET ORAL
Status: COMPLETED | OUTPATIENT
Start: 2022-12-05 | End: 2022-12-05

## 2022-12-05 RX ORDER — ONDANSETRON 4 MG/1
4 TABLET, ORALLY DISINTEGRATING ORAL
Status: COMPLETED | OUTPATIENT
Start: 2022-12-05 | End: 2022-12-05

## 2022-12-05 RX ORDER — ONDANSETRON 4 MG/1
4 TABLET, FILM COATED ORAL
Qty: 20 TABLET | Refills: 0 | Status: SHIPPED | OUTPATIENT
Start: 2022-12-05

## 2022-12-05 RX ADMIN — MECLIZINE 12.5 MG: 12.5 TABLET ORAL at 14:43

## 2022-12-05 RX ADMIN — ONDANSETRON 4 MG: 4 TABLET, ORALLY DISINTEGRATING ORAL at 19:40

## 2022-12-05 RX ADMIN — MECLIZINE 12.5 MG: 12.5 TABLET ORAL at 19:39

## 2022-12-05 RX ADMIN — IOPAMIDOL 80 ML: 755 INJECTION, SOLUTION INTRAVENOUS at 16:57

## 2022-12-05 NOTE — ED PROVIDER NOTES
Patient care taken over from Dr. Stephanie Mcmahon. Patient presented for dizziness. Pending labs, CT and CT head. Plan per previous physician were to obtain MRI brain if Cts are negative to definitively rule out for cva as patient with stroke risk factors (afib and anticoagulated). 7:24 PM  Patient re-assessed at this time- reportedly had another episode of dizziness shortly after getting up from MRI. Patient feeling better at the time of my evaluation. Patient reports associated \"queasiness. \" She shares that she has had increased sinus congestion on the left with left ear fullness over the recent past. States that she has been using flonase prescribed by PCP without relief in sxs. Otoscope exam reveal fluid behind the L TM. Patient also had horizontal unidirectional nystagmus on my exam. Highest suspicion is for peripheral vertigo 2/2 to middle ear congestion. Will medicate with additional dose of meclizine and zofran now. 8:00 PM  MRI negative for acute process. Will dc patient with referral to ent. Will provide rx for zofran and meclizine for home.

## 2022-12-05 NOTE — ED PROVIDER NOTES
Pt is a 65year old woman with hx of a fib presenting ambulatory with son for 3 days of dizziness. Pt states that she has \"clusters\" of dizzy episodes every few months and this bout is similar. Pt states that onset of dizziness was gradual and at its worst in the morning upon waking. Pt has history of polio which has caused her left leg to be weaker than the right. Pt was recently taken off of her baby aspirin by her cardiologist and is not anticoagulated for a fib. Pt was hospitalized with a fib in 2017 after a similar episode of dizziness. Pts son noted that pt has been having more difficulty walking over the past week with gradual worsening. Pt endorses some associated nausea. Pt denies chest pain, AMS, focal neuro deficits, SOB, fever, diaphoresis, leg swelling, palpitations, and abdominal pain. Dizziness  Associated symptoms include nausea. Pertinent negatives include no shortness of breath, no chest pain and no vomiting.       Past Medical History:   Diagnosis Date    Anxiety     Arrhythmia     possible AFIB while in hospital 2017    Arthritis     CAD (coronary artery disease)     carotid blockage     Fatigue     History of bone density study 2015 neg    Leg swelling     Low vitamin D level     Osteopenia     Polio     left leg shorter    SOB (shortness of breath)        Past Surgical History:   Procedure Laterality Date    HX BREAST LUMPECTOMY      HX COLONOSCOPY  03/28/2008    HX ORTHOPAEDIC  1978    NAVEEN BIOPSY BREAST STEREOTACTIC Right 1980    negative    [place] BREAST SURGERY PROCEDURE UNLISTED  1975         Family History:   Problem Relation Age of Onset    Dementia Mother     Ovarian Cancer Mother 80    Diabetes Mother     Heart Disease Father     Heart Attack Father     Cancer Paternal Uncle        Social History     Socioeconomic History    Marital status:      Spouse name: Not on file    Number of children: Not on file    Years of education: Not on file    Highest education level: Not on file Occupational History    Not on file   Tobacco Use    Smoking status: Never    Smokeless tobacco: Never   Vaping Use    Vaping Use: Never used   Substance and Sexual Activity    Alcohol use: No    Drug use: No    Sexual activity: Not Currently     Partners: Male     Birth control/protection: None   Other Topics Concern    Not on file   Social History Narrative    Not on file     Social Determinants of Health     Financial Resource Strain: Low Risk     Difficulty of Paying Living Expenses: Not hard at all   Food Insecurity: No Food Insecurity    Worried About Running Out of Food in the Last Year: Never true    Ran Out of Food in the Last Year: Never true   Transportation Needs: Not on file   Physical Activity: Not on file   Stress: Not on file   Social Connections: Not on file   Intimate Partner Violence: Not on file   Housing Stability: Not on file         ALLERGIES: E-mycin [erythromycin] and Sertraline    Review of Systems   Constitutional:  Negative for chills, diaphoresis and fever. HENT:  Negative for congestion and rhinorrhea. Eyes:  Negative for pain. Respiratory:  Negative for cough and shortness of breath. Cardiovascular:  Negative for chest pain and leg swelling. Gastrointestinal:  Positive for nausea. Negative for abdominal pain, diarrhea and vomiting. Genitourinary:  Negative for dysuria. Skin:  Negative for color change. Neurological:  Positive for dizziness. Negative for weakness and numbness. Psychiatric/Behavioral:  Negative for behavioral problems. Vitals:    12/05/22 1327   BP: (!) 141/64   Pulse: 80   Resp: 17   Temp: 97.6 °F (36.4 °C)   SpO2: 97%   Weight: 68 kg (150 lb)   Height: 5' 4\" (1.626 m)            Physical Exam  Constitutional:       General: She is not in acute distress. Appearance: Normal appearance. She is not ill-appearing. HENT:      Head: Normocephalic and atraumatic.       Right Ear: External ear normal.      Left Ear: External ear normal. Nose: Nose normal.      Mouth/Throat:      Mouth: Mucous membranes are moist.   Eyes:      Extraocular Movements: Extraocular movements intact. Pupils: Pupils are equal, round, and reactive to light. Cardiovascular:      Rate and Rhythm: Normal rate. Pulmonary:      Effort: Pulmonary effort is normal.   Abdominal:      General: Abdomen is flat. There is no distension. Musculoskeletal:         General: Normal range of motion. Cervical back: Normal range of motion. Skin:     General: Skin is warm and dry. Neurological:      General: No focal deficit present. Mental Status: She is alert and oriented to person, place, and time. Motor: Weakness present. Coordination: Coordination normal. Finger-Nose-Finger Test and Heel to Shin Test normal.      Gait: Gait abnormal.      Comments: Left leg weakness which pt states has been present since she had polio at the age of 2. Ataxic gait. Psychiatric:         Mood and Affect: Mood normal.         Behavior: Behavior normal.        MDM  Number of Diagnoses or Management Options  Diagnosis management comments: Ddx: BPPV, cerebral/brainstem stroke, central vertigo, anemia, orthostatic hypotension    64 y/o female with hx of a fib without anticoagulation with 3 days of dizziness. Pt endorses gradual onset with similar episodes in the past. Pts son claims symptoms are worsening. Pt endorses nausea but denies LOC, AMS, chest pain, SOB, abdominal pain, and diaphoresis. Vitals are stable and PE shows normal cerebellar tests with an ataxic gait. LE strength 3+ compared to 5+ for right. Cardiopulm exam normal. Bloodwork, CTA neck, ECG, troponin, CT head pending. Will order MRI brain if CTA/CT head normal. Will likely admit pt. ED Course as of 12/05/22 1433   Mon Dec 05, 2022   1347 EKG shows sinus rhythm at a rate of 76, normal intervals, normal axis, no ischemic changes.  [RD]      ED Course User Index  [RD] Bertin Hicks MD Procedures

## 2022-12-05 NOTE — ED TRIAGE NOTES
Pt arrives to ED for dizziness and lightheadedness intermittently for 3 weeks. Reports episodes of dizziness occurring more frequently. Episodes last 10-20 min. Reports blood pressure has been elevated. No hx of hypertension. HX afib.

## 2022-12-07 LAB
ATRIAL RATE: 76 BPM
CALCULATED P AXIS, ECG09: 46 DEGREES
CALCULATED R AXIS, ECG10: -9 DEGREES
CALCULATED T AXIS, ECG11: 46 DEGREES
DIAGNOSIS, 93000: NORMAL
P-R INTERVAL, ECG05: 146 MS
Q-T INTERVAL, ECG07: 392 MS
QRS DURATION, ECG06: 84 MS
QTC CALCULATION (BEZET), ECG08: 441 MS
VENTRICULAR RATE, ECG03: 76 BPM

## 2023-01-23 ENCOUNTER — TELEPHONE (OUTPATIENT)
Dept: FAMILY MEDICINE CLINIC | Age: 78
End: 2023-01-23

## 2023-01-23 NOTE — TELEPHONE ENCOUNTER
----- Message from [facility] WILLY Jauregui sent at 1/23/2023 11:18 AM EST -----  Subject: Message to Provider    QUESTIONS  Information for Provider? The pt is scheduled for a [facility] visit in April and   would like for the paperwork to be mailed in advance to have done prior to   her appt. Please advise  ---------------------------------------------------------------------------  --------------  Raquel ZUNIGA  [phone]; OK to leave message on voicemail  ---------------------------------------------------------------------------  --------------  SCRIPT ANSWERS  Relationship to Patient?  Self

## 2023-03-17 ENCOUNTER — TELEPHONE (OUTPATIENT)
Dept: FAMILY MEDICINE CLINIC | Age: 78
End: 2023-03-17

## 2023-03-17 RX ORDER — LEVOCETIRIZINE DIHYDROCHLORIDE 5 MG/1
5 TABLET, FILM COATED ORAL
Qty: 90 TABLET | Refills: 2 | Status: SHIPPED | OUTPATIENT
Start: 2023-03-17

## 2023-04-21 DIAGNOSIS — Z12.31 VISIT FOR SCREENING MAMMOGRAM: Primary | ICD-10-CM

## 2023-04-27 ENCOUNTER — OFFICE VISIT (OUTPATIENT)
Dept: FAMILY MEDICINE CLINIC | Age: 78
End: 2023-04-27
Payer: COMMERCIAL

## 2023-04-27 VITALS
BODY MASS INDEX: 27.04 KG/M2 | DIASTOLIC BLOOD PRESSURE: 78 MMHG | SYSTOLIC BLOOD PRESSURE: 122 MMHG | OXYGEN SATURATION: 97 % | RESPIRATION RATE: 16 BRPM | HEART RATE: 72 BPM | TEMPERATURE: 97.5 F | WEIGHT: 158.38 LBS | HEIGHT: 64 IN

## 2023-04-27 DIAGNOSIS — Z00.00 MEDICARE ANNUAL WELLNESS VISIT, SUBSEQUENT: Primary | ICD-10-CM

## 2023-04-27 DIAGNOSIS — Z78.0 ASYMPTOMATIC POSTMENOPAUSAL STATE: ICD-10-CM

## 2023-04-27 DIAGNOSIS — J30.1 SEASONAL ALLERGIC RHINITIS DUE TO POLLEN: ICD-10-CM

## 2023-04-27 DIAGNOSIS — Z13.31 DEPRESSION SCREENING: ICD-10-CM

## 2023-04-27 DIAGNOSIS — E78.00 PURE HYPERCHOLESTEROLEMIA: ICD-10-CM

## 2023-04-27 DIAGNOSIS — Z28.20 VACCINE REFUSED BY PATIENT: ICD-10-CM

## 2023-04-27 DIAGNOSIS — E66.3 OVERWEIGHT WITH BODY MASS INDEX (BMI) OF 27 TO 27.9 IN ADULT: ICD-10-CM

## 2023-04-27 DIAGNOSIS — I48.0 PAROXYSMAL ATRIAL FIBRILLATION (HCC): ICD-10-CM

## 2023-04-27 DIAGNOSIS — Z13.39 ALCOHOL SCREENING: ICD-10-CM

## 2023-04-27 DIAGNOSIS — Z91.81 AT LOW RISK FOR FALL: ICD-10-CM

## 2023-04-27 PROCEDURE — G8432 DEP SCR NOT DOC, RNG: HCPCS | Performed by: NURSE PRACTITIONER

## 2023-04-27 PROCEDURE — G8427 DOCREV CUR MEDS BY ELIG CLIN: HCPCS | Performed by: NURSE PRACTITIONER

## 2023-04-27 PROCEDURE — 1090F PRES/ABSN URINE INCON ASSESS: CPT | Performed by: NURSE PRACTITIONER

## 2023-04-27 PROCEDURE — 1123F ACP DISCUSS/DSCN MKR DOCD: CPT | Performed by: NURSE PRACTITIONER

## 2023-04-27 PROCEDURE — G0439 PPPS, SUBSEQ VISIT: HCPCS | Performed by: NURSE PRACTITIONER

## 2023-04-27 PROCEDURE — G8536 NO DOC ELDER MAL SCRN: HCPCS | Performed by: NURSE PRACTITIONER

## 2023-04-27 PROCEDURE — G8417 CALC BMI ABV UP PARAM F/U: HCPCS | Performed by: NURSE PRACTITIONER

## 2023-04-27 PROCEDURE — 99214 OFFICE O/P EST MOD 30 MIN: CPT | Performed by: NURSE PRACTITIONER

## 2023-04-27 PROCEDURE — G8400 PT W/DXA NO RESULTS DOC: HCPCS | Performed by: NURSE PRACTITIONER

## 2023-04-27 PROCEDURE — 1101F PT FALLS ASSESS-DOCD LE1/YR: CPT | Performed by: NURSE PRACTITIONER

## 2023-04-27 RX ORDER — PRAVASTATIN SODIUM 40 MG/1
40 TABLET ORAL
Qty: 90 TABLET | Refills: 3 | Status: SHIPPED | OUTPATIENT
Start: 2023-04-27

## 2023-04-27 NOTE — PROGRESS NOTES
Medicare Wellness Exam:    Chief Complaint   Patient presents with    Annual Wellness Visit     she is a 65y.o. year old female who presents for evaluation for their Medicare Wellness Visit. Patient presents for [facility] fasting labs, and management of HLD and allergic rhinitis. Pravastatin daily for HLD. Xyzal and Flonase for seasonal allergies. Follows with cardiology annually for management of atrial fib. Baby aspirin stopped last year per cardiology. Fall Screen is completed and assessed=yes  Depression Screen is completed and assessed=yes  Medication list reviewed and adjusted for accuracy=yes  Immunizations reviewed and updated=yes  Health/Preventative Screenings reviewed and updated=yes  ADL Functions reviewed=yes  MiniCog score= 5/5 (2points for clock, 3/3 for recall)  See scanned medicare wellness documents for full details. Patient Active Problem List    Diagnosis    Paroxysmal atrial fibrillation [facility])     Sees Dr. Enmanuel Rollins annually. No current symptoms. Carotid artery stenosis    Generalized anxiety disorder    Pure hypercholesterolemia    Osteopenia    Overweight with body mass index (BMI) of 29 to 29.9 in adult    Vitamin D deficiency    Urinary incontinence    Syncope       Reviewed PmHx, RxHx, FmHx, SocHx, AllgHx and updated and dated in the chart. Objective:     Vitals:    04/27/23 0954   BP: 122/78   Pulse: 72   Resp: 16   Temp: 97.5 °F (36.4 °C)   TempSrc: Temporal   SpO2: 97%   Weight: 158 lb 6 oz (71.8 kg)   Height: 5' 4\" (1.626 m)     Physical Exam  Vitals and nursing note reviewed. Constitutional:       General: She is not in acute distress. Appearance: Normal appearance. She is overweight. HENT:      Head: Normocephalic. Eyes:      Extraocular Movements: Extraocular movements intact. Neck:      Thyroid: No thyroid mass, thyromegaly or thyroid tenderness. Cardiovascular:      Rate and Rhythm: Normal rate and regular rhythm.       Heart sounds: Normal heart sounds. Pulmonary:      Effort: Pulmonary effort is normal.      Breath sounds: Normal breath sounds. Musculoskeletal:         General: Normal range of motion. Cervical back: Neck supple. Right lower leg: No edema. Left lower leg: No edema. Lymphadenopathy:      Cervical: No cervical adenopathy. Upper Body:      Right upper body: No supraclavicular adenopathy. Left upper body: No supraclavicular adenopathy. Skin:     General: Skin is warm and dry. Neurological:      Mental Status: She is alert and oriented to person, place, and time. Psychiatric:         Mood and Affect: Mood normal.         Behavior: Behavior normal.        Assessment/ Plan:   Diagnoses and all orders for this visit:    1. Medicare annual wellness visit, subsequent  [facility] exam completed as documented. 2. Depression screening  Negative depression screening. 3. Alcohol screening  Low risk for alcohol misuse. 4. At low risk for fall  Low fall risk. 5. Overweight with body mass index (BMI) of 27 to 27.9 in adult  Continue to stay active and eat a healthy diet. 6. Vaccine refused by patient  Declines COVID booster and Shingrix pains. Understands risks. 7. Asymptomatic postmenopausal state  -     DEXA BONE DENSITY STUDY AXIAL; Future    8. Pure hypercholesterolemia  Annual labs. Discussed updated guidelines regarding statin use for primary prevention of cardiovascular risk after the age of 76. She prefers to continue pravastatin daily. Continue to stay active and limit fat/cholesterol intake. -     CBC WITH AUTOMATED DIFF  -     LIPID PANEL  -     METABOLIC PANEL, COMPREHENSIVE  -     pravastatin (PRAVACHOL) 40 mg tablet; Take 1 Tablet by mouth nightly. 9. Seasonal allergic rhinitis due to pollen  Well-controlled with Xyzal and Flonase for seasonal allergies. Medications will be refilled when requested.     10. Paroxysmal atrial fibrillation ([address]  Follows with cardiology annually for management. Heart rate regular on exam today.       -Pain evaluation performed in office  -Cognitive Screen performed in office  -Depression Screen, Fall risks (by up and go test)  and ADL functionality were addressed  -Medication list updated and reviewed for any changes   -A comprehensive review of medical issues and a plan was formulated  -End of life planning was addressed with pt   -Health Screenings for preventions were addressed and a plan was formulated  -Shingles Vaccine was recommended  -Discussed with patient cancer risk factors and appropriate screenings for age  -Patient evaluated for colonoscopy and referred if needed per screeing criteria  -Labs from previous visits were discussed with patient   -Discussed with patient diet and exercise and formulated a plan as needed  -An Advanced care plan was developed with the patient.  -Alcohol screening performed and was negative    -  Follow-up and Dispositions    Return in about 1 year (around 4/27/2024) for Catarina Perla, fasting labs, follow up, chronic conditions/meds. I have discussed the diagnosis with the patient and the intended plan as seen in the above orders. The patient understands and agrees with the plan. The patient has received an after-visit summary and questions were answered concerning future plans. Medication Side Effects and Warnings were discussed with patient  Patient Labs were reviewed and or requested  Patient Past Records were reviewed and or requested    Aspects of this note may have been generated using voice recognition software. Despite editing, there may be some syntax errors.     Alvaro Hodgkins FNP-C

## 2023-04-27 NOTE — PROGRESS NOTES
Chief Complaint   Patient presents with    Annual Wellness Visit    1. \"Have you been to the ER, urgent care clinic since your last visit? Hospitalized since your last visit? \" No    2. \"Have you seen or consulted any other health care providers outside of the [address] [place] [address] since your last visit? \" No     3. For patients aged 39-70: Has the patient had a colonoscopy / FIT/ Cologuard? NA - based on age      If the patient is female:    4. For patients aged 41-77: Has the patient had a mammogram within the past 2 years? NA - based on age or sex      11. For patients aged 21-65: Has the patient had a pap smear?  NA - based on age or sex Visit Vitals  /78   Pulse 72   Temp 97.5 °F (36.4 °C) (Temporal)   Resp 16   Ht 5' 4\" (1.626 m)   Wt 158 lb 6 oz (71.8 kg)   SpO2 97%   BMI 27.19 kg/m²      3 most recent PHQ Screens 4/27/2023   Little interest or pleasure in doing things Not at all   Feeling down, depressed, irritable, or hopeless Not at all   Total Score PHQ 2 0

## 2023-04-28 ENCOUNTER — TRANSCRIBE ORDERS (OUTPATIENT)
Facility: HOSPITAL | Age: 78
End: 2023-04-28

## 2023-04-28 DIAGNOSIS — Z78.0 ASYMPTOMATIC POSTMENOPAUSAL STATE: Primary | ICD-10-CM

## 2023-04-28 LAB
ALBUMIN SERPL-MCNC: 4.2 G/DL (ref 3.7–4.7)
ALBUMIN/GLOB SERPL: 1.9 {RATIO} (ref 1.2–2.2)
ALP SERPL-CCNC: 72 IU/L (ref 44–121)
ALT SERPL-CCNC: 11 IU/L (ref 0–32)
AST SERPL-CCNC: 18 IU/L (ref 0–40)
BASOPHILS # BLD AUTO: 0 X10E3/UL (ref 0–0.2)
BASOPHILS NFR BLD AUTO: 0 %
BILIRUB SERPL-MCNC: 0.5 MG/DL (ref 0–1.2)
BUN SERPL-MCNC: 13 MG/DL (ref 8–27)
BUN/CREAT SERPL: 16 (ref 12–28)
CALCIUM SERPL-MCNC: 9.1 MG/DL (ref 8.7–10.3)
CHLORIDE SERPL-SCNC: 101 MMOL/L (ref 96–106)
CHOLEST SERPL-MCNC: 151 MG/DL (ref 100–199)
CO2 SERPL-SCNC: 25 MMOL/L (ref 20–29)
CREAT SERPL-MCNC: 0.79 MG/DL (ref 0.57–1)
EGFRCR SERPLBLD CKD-EPI 2021: 77 ML/MIN/1.73
EOSINOPHIL # BLD AUTO: 0.1 X10E3/UL (ref 0–0.4)
EOSINOPHIL NFR BLD AUTO: 1 %
ERYTHROCYTE [DISTWIDTH] IN BLOOD BY AUTOMATED COUNT: 12.7 % (ref 11.7–15.4)
GLOBULIN SER CALC-MCNC: 2.2 G/DL (ref 1.5–4.5)
GLUCOSE SERPL-MCNC: 89 MG/DL (ref 70–99)
HCT VFR BLD AUTO: 44.2 % (ref 34–46.6)
HDLC SERPL-MCNC: 75 MG/DL
HGB BLD-MCNC: 14.9 G/DL (ref 11.1–15.9)
IMM GRANULOCYTES # BLD AUTO: 0 X10E3/UL (ref 0–0.1)
IMM GRANULOCYTES NFR BLD AUTO: 0 %
LDLC SERPL CALC-MCNC: 63 MG/DL (ref 0–99)
LYMPHOCYTES # BLD AUTO: 1.3 X10E3/UL (ref 0.7–3.1)
LYMPHOCYTES NFR BLD AUTO: 25 %
MCH RBC QN AUTO: 31.2 PG (ref 26.6–33)
MCHC RBC AUTO-ENTMCNC: 33.7 G/DL (ref 31.5–35.7)
MCV RBC AUTO: 93 FL (ref 79–97)
MONOCYTES # BLD AUTO: 0.5 X10E3/UL (ref 0.1–0.9)
MONOCYTES NFR BLD AUTO: 11 %
NEUTROPHILS # BLD AUTO: 3.2 X10E3/UL (ref 1.4–7)
NEUTROPHILS NFR BLD AUTO: 63 %
PLATELET # BLD AUTO: 271 X10E3/UL (ref 150–450)
POTASSIUM SERPL-SCNC: 4.4 MMOL/L (ref 3.5–5.2)
PROT SERPL-MCNC: 6.4 G/DL (ref 6–8.5)
RBC # BLD AUTO: 4.77 X10E6/UL (ref 3.77–5.28)
SODIUM SERPL-SCNC: 141 MMOL/L (ref 134–144)
TRIGL SERPL-MCNC: 67 MG/DL (ref 0–149)
VLDLC SERPL CALC-MCNC: 13 MG/DL (ref 5–40)
WBC # BLD AUTO: 5.1 X10E3/UL (ref 3.4–10.8)

## 2023-05-24 ENCOUNTER — TELEPHONE (OUTPATIENT)
Facility: CLINIC | Age: 78
End: 2023-05-24

## 2023-05-24 NOTE — TELEPHONE ENCOUNTER
----- Message from Kurt Dang sent at 5/24/2023 10:02 AM EDT -----  Subject: Message to Provider    QUESTIONS  Information for Provider? Please mail hard copy of lab results from 4/27   to home address? [address], [place], [address]  ---------------------------------------------------------------------------  --------------  Beatrice Mcmullen [facility]  [phone]; OK to leave message on voicemail  ---------------------------------------------------------------------------  --------------  SCRIPT ANSWERS  Relationship to Patient?  Self

## 2023-06-19 ENCOUNTER — HOSPITAL ENCOUNTER (OUTPATIENT)
Facility: HOSPITAL | Age: 78
Discharge: HOME | End: 2023-06-22
Payer: MEDICARE

## 2023-06-19 DIAGNOSIS — Z78.0 ASYMPTOMATIC POSTMENOPAUSAL STATE: ICD-10-CM

## 2023-06-19 PROCEDURE — 77080 DXA BONE DENSITY AXIAL: CPT

## 2023-06-21 ENCOUNTER — TELEPHONE (OUTPATIENT)
Facility: CLINIC | Age: 78
End: 2023-06-21

## 2023-07-20 RX ORDER — ALENDRONATE SODIUM 70 MG/1
70 TABLET ORAL
Qty: 12 TABLET | Refills: 1 | Status: SHIPPED | OUTPATIENT
Start: 2023-07-20 | End: 2023-07-21

## 2023-07-21 ENCOUNTER — OFFICE VISIT (OUTPATIENT)
Age: 78
End: 2023-07-21
Payer: MEDICARE

## 2023-07-21 VITALS
OXYGEN SATURATION: 96 % | WEIGHT: 157 LBS | HEIGHT: 64 IN | HEART RATE: 76 BPM | SYSTOLIC BLOOD PRESSURE: 130 MMHG | BODY MASS INDEX: 26.8 KG/M2 | DIASTOLIC BLOOD PRESSURE: 86 MMHG

## 2023-07-21 DIAGNOSIS — I65.23 OCCLUSION AND STENOSIS OF BILATERAL CAROTID ARTERIES: ICD-10-CM

## 2023-07-21 DIAGNOSIS — R55 SYNCOPE AND COLLAPSE: ICD-10-CM

## 2023-07-21 DIAGNOSIS — E78.5 HYPERLIPIDEMIA, UNSPECIFIED HYPERLIPIDEMIA TYPE: ICD-10-CM

## 2023-07-21 DIAGNOSIS — R06.02 SHORTNESS OF BREATH: Primary | ICD-10-CM

## 2023-07-21 PROCEDURE — G8419 CALC BMI OUT NRM PARAM NOF/U: HCPCS | Performed by: SPECIALIST

## 2023-07-21 PROCEDURE — G8399 PT W/DXA RESULTS DOCUMENT: HCPCS | Performed by: SPECIALIST

## 2023-07-21 PROCEDURE — 99214 OFFICE O/P EST MOD 30 MIN: CPT | Performed by: SPECIALIST

## 2023-07-21 PROCEDURE — G8427 DOCREV CUR MEDS BY ELIG CLIN: HCPCS | Performed by: SPECIALIST

## 2023-07-21 PROCEDURE — 1036F TOBACCO NON-USER: CPT | Performed by: SPECIALIST

## 2023-07-21 PROCEDURE — 1090F PRES/ABSN URINE INCON ASSESS: CPT | Performed by: SPECIALIST

## 2023-07-21 PROCEDURE — 1123F ACP DISCUSS/DSCN MKR DOCD: CPT | Performed by: SPECIALIST

## 2023-07-21 NOTE — PROGRESS NOTES
CARDIOLOGY OFFICE NOTE    Dany Leslie MD, [facility] [address], [place]  Phone [phone]; Fax [phone]  Mobile [phone]   Voice Mail [phone]    Primary care: Flip Franklin MD       ATTENTION:   This medical record was transcribed using an electronic medical records/speech recognition system. Although proofread, it may and can contain electronic, spelling and other errors. Corrections may be executed at a later time. Please feel free to contact us for any clarifications as needed. Alona Lawler is a 65 y.o. female with  referred for dyslipidemia       Cardiac risk factors: family history, dyslipidemia, post-menopausal   I have personally obtained the history from the patient. HISTORY OF PRESENTING ILLNESS    Ms./Mr. Dalila Sexton  65 y.o. is doing really well. She walks about 2 miles every day. She continues to work out in the yard with no issues. She has not had any syncopal or presyncopal episodes since I last saw her. I think she should stay on her Pravachol since she does have a history of carotid disease albeit minor narrowing. Aspirin I do not think she necessarily needs unless the neurologist feel she should stay on this.          ACTIVE PROBLEM LIST     Patient Active Problem List    Diagnosis Date Noted    Carotid artery stenosis 09/03/2020    Osteopenia 09/03/2020    Generalized anxiety disorder 09/03/2020    Paroxysmal atrial fibrillation ([address]) 09/03/2020    Overweight with body mass index (BMI) of 29 to 29.9 in adult 09/03/2020    Pure hypercholesterolemia 09/03/2020    Vitamin D deficiency 09/03/2020    Urinary incontinence 02/18/2017    Syncope 02/17/2017           PAST MEDICAL HISTORY     Past Medical History:   Diagnosis Date    Anxiety     Arthritis     Atrial fibrillation (HCC)     CAD (coronary artery disease)     Fatigue     History of bone density study 2015 neg    Leg swelling     Low
NAME Marcelina Ho         1945      MRN    [id number]      LAST OFFICE APPOINTMENT: 2022     DIAGNOSIS  No diagnosis found. HOME MEDICATION  Current Outpatient Medications   Medication Sig    vitamin D 25 MCG (1000 UT) CAPS Take 1 capsule by mouth daily    fluticasone (FLONASE) 50 MCG/ACT nasal spray 2 sprays by Nasal route daily    pravastatin (PRAVACHOL) 40 MG tablet TAKE 1 TABLET AT BEDTIME    alendronate (FOSAMAX) 70 MG tablet Take 1 tablet by mouth every 7 days (Patient not taking: Reported on 2023)    meclizine (ANTIVERT) 25 MG tablet Take 25 mg by mouth 3 times daily as needed (Patient not taking: Reported on 2023)    ondansetron (ZOFRAN) 4 MG tablet Take 4 mg by mouth every 8 hours as needed (Patient not taking: Reported on 2023)     No current facility-administered medications for this visit. VITAL SIGNS  Wt Readings from Last 3 Encounters:   23 157 lb (71.2 kg)   23 158 lb 6 oz (71.8 kg)   22 152 lb (68.9 kg)     BP Readings from Last 3 Encounters:   23 130/86   23 122/78   10/11/22 136/84     Pulse Readings from Last 3 Encounters:   23 76   23 72   10/11/22 82         SPECIALTY COMMENTS  1. Echo   17- EF 60%   20-EF 64%. 2. Lipids   19- , HDL 75, LDL 61, TG 57   20- , HDL 71, LDL 64, TG 62   21- , HDL 78, LDL 59, TG 63   22- , HDL 73, LDL 53, TG 76  23- , HDL 75, LDL 63, TG 67        3. Carotid Doppler   17- R 50-69%, L 0-49%   17- R 16-49%, L 1-15%   18- Leoncio 16-49%   10/7/19- Leoncio 16-49%   20- Leoncio 10-49%  10/11/22-Leoncio 16-49%    4. Myocardial perfusion study   17- (exercise stress) no ischemia     5.  Loop   17 - 3/24/17- NSR
Opt out

## 2023-07-21 NOTE — PATIENT INSTRUCTIONS

## 2023-10-10 ENCOUNTER — OFFICE VISIT (OUTPATIENT)
Age: 78
End: 2023-10-10
Payer: MEDICARE

## 2023-10-10 VITALS
OXYGEN SATURATION: 96 % | SYSTOLIC BLOOD PRESSURE: 132 MMHG | RESPIRATION RATE: 14 BRPM | DIASTOLIC BLOOD PRESSURE: 82 MMHG | HEART RATE: 82 BPM

## 2023-10-10 DIAGNOSIS — I65.23 OCCLUSION AND STENOSIS OF BILATERAL CAROTID ARTERIES: Primary | ICD-10-CM

## 2023-10-10 PROCEDURE — G8399 PT W/DXA RESULTS DOCUMENT: HCPCS | Performed by: PSYCHIATRY & NEUROLOGY

## 2023-10-10 PROCEDURE — 99213 OFFICE O/P EST LOW 20 MIN: CPT | Performed by: PSYCHIATRY & NEUROLOGY

## 2023-10-10 PROCEDURE — 1123F ACP DISCUSS/DSCN MKR DOCD: CPT | Performed by: PSYCHIATRY & NEUROLOGY

## 2023-10-10 PROCEDURE — 1036F TOBACCO NON-USER: CPT | Performed by: PSYCHIATRY & NEUROLOGY

## 2023-10-10 PROCEDURE — 1090F PRES/ABSN URINE INCON ASSESS: CPT | Performed by: PSYCHIATRY & NEUROLOGY

## 2023-10-10 PROCEDURE — G8484 FLU IMMUNIZE NO ADMIN: HCPCS | Performed by: PSYCHIATRY & NEUROLOGY

## 2023-10-10 PROCEDURE — G8427 DOCREV CUR MEDS BY ELIG CLIN: HCPCS | Performed by: PSYCHIATRY & NEUROLOGY

## 2023-10-10 PROCEDURE — G8419 CALC BMI OUT NRM PARAM NOF/U: HCPCS | Performed by: PSYCHIATRY & NEUROLOGY

## 2023-10-10 RX ORDER — ASPIRIN 81 MG/1
81 TABLET ORAL DAILY
COMMUNITY

## 2023-10-10 RX ORDER — LEVOCETIRIZINE DIHYDROCHLORIDE 5 MG/1
5 TABLET, FILM COATED ORAL DAILY PRN
COMMUNITY
Start: 2023-03-17

## 2023-10-10 NOTE — PROGRESS NOTES
[facility] and [address] at [facility] at [address], [id number] [place] [address]   [phone]              No chief complaint on file. Current Outpatient Medications   Medication Sig Dispense Refill    levocetirizine (XYZAL) 5 MG tablet Take 1 tablet by mouth daily as needed      aspirin 81 MG EC tablet Take 1 tablet by mouth daily      vitamin D 25 MCG (1000 UT) CAPS Take 1 capsule by mouth daily      fluticasone (FLONASE) 50 MCG/ACT nasal spray 2 sprays by Nasal route daily      pravastatin (PRAVACHOL) 40 MG tablet TAKE 1 TABLET AT BEDTIME       No current facility-administered medications for this visit. Allergies   Allergen Reactions    Erythromycin Nausea Only    Sertraline Other (See Comments)     Severe nervousness/shaky/jittery      Social History     Tobacco Use    Smoking status: Never    Smokeless tobacco: Never   Substance Use Topics    Alcohol use: No    Drug use: No     Mrs. Sexton returns forFollow-up today. She was last seen around this time last year. She has mild carotid stenosis. Cardiology note from July where she was seen with no reports of syncope or presyncope. She was continued on Pravachol. He wanted her to discuss whether I thought she needed to be on aspirin. We discussed recent studies and I think is fine for her to come off of the aspirin. She has not had any strokelike symptoms    Laboratory analysis from April 27, 2023  CBC unremarkable  Comprehensive metabolic profile unremarkable  Lipid panel with an LDL of 63      Examination  /82   Pulse 82   Resp 14   SpO2 96%   Pleasant lady. She is awake alert and oriented. Speech and language normal.  Cognition normal.  No ataxia. Steady gait.     Impression/Plan  Carotid stenosis stable  We we will reschedule her Doppler as the tech is not in this office today

## 2023-10-11 ENCOUNTER — HOSPITAL ENCOUNTER (OUTPATIENT)
Facility: HOSPITAL | Age: 78
Discharge: HOME | End: 2023-10-14
Payer: MEDICARE

## 2023-10-11 VITALS — HEIGHT: 64 IN | BODY MASS INDEX: 26.8 KG/M2 | WEIGHT: 157 LBS

## 2023-10-11 DIAGNOSIS — Z12.31 VISIT FOR SCREENING MAMMOGRAM: ICD-10-CM

## 2023-10-11 PROCEDURE — 77063 BREAST TOMOSYNTHESIS BI: CPT

## 2023-11-14 ENCOUNTER — TELEPHONE (OUTPATIENT)
Age: 78
End: 2023-11-14

## 2024-04-30 ENCOUNTER — OFFICE VISIT (OUTPATIENT)
Facility: CLINIC | Age: 79
End: 2024-04-30
Payer: MEDICARE

## 2024-04-30 VITALS
WEIGHT: 145.4 LBS | DIASTOLIC BLOOD PRESSURE: 78 MMHG | SYSTOLIC BLOOD PRESSURE: 124 MMHG | BODY MASS INDEX: 24.82 KG/M2 | HEART RATE: 81 BPM | RESPIRATION RATE: 20 BRPM | TEMPERATURE: 97.8 F | HEIGHT: 64 IN | OXYGEN SATURATION: 98 %

## 2024-04-30 DIAGNOSIS — E55.9 VITAMIN D DEFICIENCY: ICD-10-CM

## 2024-04-30 DIAGNOSIS — Z23 ENCOUNTER FOR IMMUNIZATION: ICD-10-CM

## 2024-04-30 DIAGNOSIS — I48.0 PAROXYSMAL ATRIAL FIBRILLATION (HCC): ICD-10-CM

## 2024-04-30 DIAGNOSIS — I65.22 STENOSIS OF LEFT CAROTID ARTERY: ICD-10-CM

## 2024-04-30 DIAGNOSIS — E78.00 PURE HYPERCHOLESTEROLEMIA: ICD-10-CM

## 2024-04-30 DIAGNOSIS — Z00.00 MEDICARE ANNUAL WELLNESS VISIT, SUBSEQUENT: Primary | ICD-10-CM

## 2024-04-30 PROCEDURE — G8420 CALC BMI NORM PARAMETERS: HCPCS | Performed by: FAMILY MEDICINE

## 2024-04-30 PROCEDURE — 1090F PRES/ABSN URINE INCON ASSESS: CPT | Performed by: FAMILY MEDICINE

## 2024-04-30 PROCEDURE — G0439 PPPS, SUBSEQ VISIT: HCPCS | Performed by: FAMILY MEDICINE

## 2024-04-30 PROCEDURE — G8399 PT W/DXA RESULTS DOCUMENT: HCPCS | Performed by: FAMILY MEDICINE

## 2024-04-30 PROCEDURE — 99214 OFFICE O/P EST MOD 30 MIN: CPT | Performed by: FAMILY MEDICINE

## 2024-04-30 PROCEDURE — G8427 DOCREV CUR MEDS BY ELIG CLIN: HCPCS | Performed by: FAMILY MEDICINE

## 2024-04-30 PROCEDURE — 1123F ACP DISCUSS/DSCN MKR DOCD: CPT | Performed by: FAMILY MEDICINE

## 2024-04-30 PROCEDURE — 1036F TOBACCO NON-USER: CPT | Performed by: FAMILY MEDICINE

## 2024-04-30 RX ORDER — ATORVASTATIN CALCIUM 10 MG/1
10 TABLET, FILM COATED ORAL DAILY
Qty: 90 TABLET | Refills: 1 | Status: SHIPPED | OUTPATIENT
Start: 2024-04-30

## 2024-04-30 RX ORDER — ASCORBIC ACID 500 MG
500 TABLET ORAL DAILY
COMMUNITY

## 2024-04-30 RX ORDER — GINGER ROOT/GINGER ROOT EXT 262.5 MG
1 CAPSULE ORAL DAILY
COMMUNITY

## 2024-04-30 SDOH — ECONOMIC STABILITY: INCOME INSECURITY: HOW HARD IS IT FOR YOU TO PAY FOR THE VERY BASICS LIKE FOOD, HOUSING, MEDICAL CARE, AND HEATING?: NOT HARD AT ALL

## 2024-04-30 SDOH — ECONOMIC STABILITY: FOOD INSECURITY: WITHIN THE PAST 12 MONTHS, THE FOOD YOU BOUGHT JUST DIDN'T LAST AND YOU DIDN'T HAVE MONEY TO GET MORE.: NEVER TRUE

## 2024-04-30 SDOH — ECONOMIC STABILITY: HOUSING INSECURITY
IN THE LAST 12 MONTHS, WAS THERE A TIME WHEN YOU DID NOT HAVE A STEADY PLACE TO SLEEP OR SLEPT IN A SHELTER (INCLUDING NOW)?: NO

## 2024-04-30 SDOH — ECONOMIC STABILITY: FOOD INSECURITY: WITHIN THE PAST 12 MONTHS, YOU WORRIED THAT YOUR FOOD WOULD RUN OUT BEFORE YOU GOT MONEY TO BUY MORE.: NEVER TRUE

## 2024-04-30 ASSESSMENT — PATIENT HEALTH QUESTIONNAIRE - PHQ9
SUM OF ALL RESPONSES TO PHQ QUESTIONS 1-9: 0
SUM OF ALL RESPONSES TO PHQ9 QUESTIONS 1 & 2: 0
SUM OF ALL RESPONSES TO PHQ QUESTIONS 1-9: 0
SUM OF ALL RESPONSES TO PHQ QUESTIONS 1-9: 0
1. LITTLE INTEREST OR PLEASURE IN DOING THINGS: NOT AT ALL
SUM OF ALL RESPONSES TO PHQ QUESTIONS 1-9: 0
2. FEELING DOWN, DEPRESSED OR HOPELESS: NOT AT ALL

## 2024-04-30 ASSESSMENT — LIFESTYLE VARIABLES
HOW OFTEN DO YOU HAVE A DRINK CONTAINING ALCOHOL: NEVER
HOW MANY STANDARD DRINKS CONTAINING ALCOHOL DO YOU HAVE ON A TYPICAL DAY: PATIENT DOES NOT DRINK

## 2024-04-30 NOTE — PROGRESS NOTES
Medicare Annual Wellness Visit    Debi Sexton is here for Medicare AWV    Assessment & Plan   Medicare annual wellness visit, subsequent    Encounter for immunization  Note current CDC guidelines for respiratory vaccines:    - all adults to have flu and COVID vaccine in the fall  - 65+ to have additional COVID vaccine in spring  - 60+ to have one time RSV vaccine (available in fall and winter).  Also encourage going to the pharmacy now for initiation of Shingrix series.    Paroxysmal atrial fibrillation (HCC)  Assessment & Plan:  Sees Dr. Perry annually.  No current symptoms.     Pure hypercholesterolemia  Assessment & Plan:  Stopped pravastatin 40 mg in January 2024. Working on healthy diet.  I am restarting low dose atorvastatin instead for the CV protection that is needed with CA stenosis history.   Orders:  -     Lipid Panel  -     CBC with Auto Differential  -     Comprehensive Metabolic Panel    Vitamin D deficiency  -     Vitamin D 25 Hydroxy  Patient reports taking 1000 unit supplement daily.  We are checking level with labs to ensure it is at goal on this dosing.    Stenosis of left carotid artery  I am advising restarting statin due to this carotid artery stenosis history but would recommend that we start her with atorvastatin as opposed to the pravastatin for its greater cardiovascular protection.      Recommendations for Preventive Services Due: see orders and patient instructions/AVS.  Recommended screening schedule for the next 5-10 years is provided to the patient in written form: see Patient Instructions/AVS.     Return in about 6 months (around 10/30/2024).     Subjective   The following acute and/or chronic problems were also addressed today:  She is also following up on history of hyperlipidemia and vitamin D deficiency.  She notes that she stopped her pravastatin 40 mg in January recalling that I had asked her last year she was interested in cutting back.  She figured she would continue to

## 2024-04-30 NOTE — ASSESSMENT & PLAN NOTE
Stopped pravastatin 40 mg in January 2024. Working on healthy diet.  I am restarting low dose atorvastatin instead for the CV protection that is needed with CA stenosis history.

## 2024-04-30 NOTE — PROGRESS NOTES
\"Have you been to the ER, urgent care clinic since your last visit?  Hospitalized since your last visit?\"    NO    “Have you seen or consulted any other health care providers outside of [facility] since your last visit?”    NO  Chief Complaint   Patient presents with    Medicare AWV      /78 (Site: Right Upper Arm, Position: Sitting, Cuff Size: Medium Adult)   Pulse 81   Temp 97.8 °F (36.6 °C) (Temporal)   Resp 20   Ht 1.626 m (5' 4\")   Wt 66 kg (145 lb 6.4 oz)   SpO2 98%   BMI 24.96 kg/m²    PHQ-9 Total Score: 0 (2024  7:49 AM)         No questionnaires available.                          Health Maintenance Due   Topic Date Due    Shingles vaccine (1 of 2) Never done    Respiratory Syncytial Virus (RSV) Pregnant or age 60 yrs+ (1 - 1-dose 60+ series) Never done    COVID-19 Vaccine (2023- season) 2023    Annual Wellness Visit (Medicare)  2024    Depression Screen  2024          Click Here for Release of Records Request     Identified Patient with 2 Patient Identifiers-Name and

## 2024-05-01 LAB
25(OH)D3+25(OH)D2 SERPL-MCNC: 53.2 NG/ML (ref 30–100)
ALBUMIN SERPL-MCNC: 4.1 G/DL (ref 3.8–4.8)
ALBUMIN/GLOB SERPL: 2.6 {RATIO} (ref 1.2–2.2)
ALP SERPL-CCNC: 57 IU/L (ref 44–121)
ALT SERPL-CCNC: 12 IU/L (ref 0–32)
AST SERPL-CCNC: 16 IU/L (ref 0–40)
BASOPHILS # BLD AUTO: 0 X10E3/UL (ref 0–0.2)
BASOPHILS NFR BLD AUTO: 1 %
BILIRUB SERPL-MCNC: 0.5 MG/DL (ref 0–1.2)
BUN SERPL-MCNC: 16 MG/DL (ref 8–27)
BUN/CREAT SERPL: 21 (ref 12–28)
CALCIUM SERPL-MCNC: 9.1 MG/DL (ref 8.7–10.3)
CHLORIDE SERPL-SCNC: 103 MMOL/L (ref 96–106)
CHOLEST SERPL-MCNC: 177 MG/DL (ref 100–199)
CO2 SERPL-SCNC: 23 MMOL/L (ref 20–29)
CREAT SERPL-MCNC: 0.77 MG/DL (ref 0.57–1)
EGFRCR SERPLBLD CKD-EPI 2021: 78 ML/MIN/1.73
EOSINOPHIL # BLD AUTO: 0.1 X10E3/UL (ref 0–0.4)
EOSINOPHIL NFR BLD AUTO: 2 %
ERYTHROCYTE [DISTWIDTH] IN BLOOD BY AUTOMATED COUNT: 12.6 % (ref 11.7–15.4)
GLOBULIN SER CALC-MCNC: 1.6 G/DL (ref 1.5–4.5)
GLUCOSE SERPL-MCNC: 84 MG/DL (ref 70–99)
HCT VFR BLD AUTO: 42 % (ref 34–46.6)
HDLC SERPL-MCNC: 74 MG/DL
HGB BLD-MCNC: 13.5 G/DL (ref 11.1–15.9)
IMM GRANULOCYTES # BLD AUTO: 0 X10E3/UL (ref 0–0.1)
IMM GRANULOCYTES NFR BLD AUTO: 0 %
LDLC SERPL CALC-MCNC: 90 MG/DL (ref 0–99)
LYMPHOCYTES # BLD AUTO: 1.4 X10E3/UL (ref 0.7–3.1)
LYMPHOCYTES NFR BLD AUTO: 37 %
MCH RBC QN AUTO: 31.3 PG (ref 26.6–33)
MCHC RBC AUTO-ENTMCNC: 32.1 G/DL (ref 31.5–35.7)
MCV RBC AUTO: 97 FL (ref 79–97)
MONOCYTES # BLD AUTO: 0.5 X10E3/UL (ref 0.1–0.9)
MONOCYTES NFR BLD AUTO: 13 %
NEUTROPHILS # BLD AUTO: 1.9 X10E3/UL (ref 1.4–7)
NEUTROPHILS NFR BLD AUTO: 47 %
PLATELET # BLD AUTO: 273 X10E3/UL (ref 150–450)
POTASSIUM SERPL-SCNC: 4.3 MMOL/L (ref 3.5–5.2)
PROT SERPL-MCNC: 5.7 G/DL (ref 6–8.5)
RBC # BLD AUTO: 4.32 X10E6/UL (ref 3.77–5.28)
SODIUM SERPL-SCNC: 142 MMOL/L (ref 134–144)
TRIGL SERPL-MCNC: 69 MG/DL (ref 0–149)
VLDLC SERPL CALC-MCNC: 13 MG/DL (ref 5–40)
WBC # BLD AUTO: 3.9 X10E3/UL (ref 3.4–10.8)

## 2024-06-04 RX ORDER — ALENDRONATE SODIUM 70 MG/1
TABLET ORAL
Qty: 12 TABLET | Refills: 3 | Status: SHIPPED | OUTPATIENT
Start: 2024-06-04

## 2024-06-04 RX ORDER — ALENDRONATE SODIUM 70 MG/1
TABLET ORAL
Status: CANCELLED | OUTPATIENT
Start: 2024-06-04

## 2024-06-04 NOTE — TELEPHONE ENCOUNTER
Patient called in requesting a refill on her medication that she take for Osteoporosis. She did not know the name of it. She would like for it to be sent through [facility] mail order pharmacy.

## 2024-09-22 ENCOUNTER — APPOINTMENT (OUTPATIENT)
Facility: HOSPITAL | Age: 79
End: 2024-09-22
Payer: MEDICARE

## 2024-09-22 ENCOUNTER — HOSPITAL ENCOUNTER (EMERGENCY)
Facility: HOSPITAL | Age: 79
Discharge: HOME | End: 2024-09-22
Attending: STUDENT IN AN ORGANIZED HEALTH CARE EDUCATION/TRAINING PROGRAM
Payer: MEDICARE

## 2024-09-22 VITALS
DIASTOLIC BLOOD PRESSURE: 63 MMHG | OXYGEN SATURATION: 98 % | BODY MASS INDEX: 24.24 KG/M2 | HEART RATE: 65 BPM | WEIGHT: 142 LBS | RESPIRATION RATE: 14 BRPM | SYSTOLIC BLOOD PRESSURE: 125 MMHG | TEMPERATURE: 97.5 F | HEIGHT: 64 IN

## 2024-09-22 DIAGNOSIS — R55 SYNCOPE AND COLLAPSE: Primary | ICD-10-CM

## 2024-09-22 LAB
ALBUMIN SERPL-MCNC: 3.8 G/DL (ref 3.5–5.2)
ALBUMIN/GLOB SERPL: 1.7 (ref 1.1–2.2)
ALP SERPL-CCNC: 51 U/L (ref 35–104)
ALT SERPL-CCNC: 14 U/L (ref 10–35)
ANION GAP SERPL CALC-SCNC: 9 MMOL/L (ref 2–12)
APPEARANCE UR: CLEAR
AST SERPL-CCNC: 21 U/L (ref 10–35)
BACTERIA URNS QL MICRO: ABNORMAL /HPF
BASOPHILS # BLD: 0 K/UL (ref 0–1)
BASOPHILS NFR BLD: 0 % (ref 0–1)
BILIRUB SERPL-MCNC: 0.5 MG/DL (ref 0.2–1)
BILIRUB UR QL: NEGATIVE
BUN SERPL-MCNC: 16 MG/DL (ref 8–23)
BUN/CREAT SERPL: 21 (ref 12–20)
CALCIUM SERPL-MCNC: 8.4 MG/DL (ref 8.8–10.2)
CHLORIDE SERPL-SCNC: 105 MMOL/L (ref 98–107)
CO2 SERPL-SCNC: 27 MMOL/L (ref 22–29)
COLOR UR: ABNORMAL
CREAT SERPL-MCNC: 0.78 MG/DL (ref 0.5–0.9)
DIFFERENTIAL METHOD BLD: ABNORMAL
EKG ATRIAL RATE: 63 BPM
EKG DIAGNOSIS: NORMAL
EKG P AXIS: -3 DEGREES
EKG P-R INTERVAL: 136 MS
EKG Q-T INTERVAL: 412 MS
EKG QRS DURATION: 82 MS
EKG QTC CALCULATION (BAZETT): 421 MS
EKG R AXIS: 27 DEGREES
EKG T AXIS: 45 DEGREES
EKG VENTRICULAR RATE: 63 BPM
EOSINOPHIL # BLD: 0.1 K/UL (ref 0–0.4)
EOSINOPHIL NFR BLD: 3 %
EPITH CASTS URNS QL MICRO: ABNORMAL /LPF
ERYTHROCYTE [DISTWIDTH] IN BLOOD BY AUTOMATED COUNT: 12.5 % (ref 11.5–14.5)
GLOBULIN SER CALC-MCNC: 2.2 G/DL (ref 2–4)
GLUCOSE SERPL-MCNC: 94 MG/DL (ref 65–100)
GLUCOSE UR STRIP.AUTO-MCNC: NEGATIVE MG/DL
HCT VFR BLD AUTO: 38.6 % (ref 35–47)
HGB BLD-MCNC: 13.2 G/DL (ref 11.5–16)
HGB UR QL STRIP: NEGATIVE
IMM GRANULOCYTES # BLD AUTO: 0 K/UL (ref 0–0.04)
IMM GRANULOCYTES NFR BLD AUTO: 0 % (ref 0–0.5)
KETONES UR QL STRIP.AUTO: NEGATIVE MG/DL
LEUKOCYTE ESTERASE UR QL STRIP.AUTO: NEGATIVE
LYMPHOCYTES # BLD: 1 K/UL (ref 0.8–3.5)
LYMPHOCYTES NFR BLD: 22 % (ref 12–49)
MAGNESIUM SERPL-MCNC: 1.9 MG/DL (ref 1.6–2.4)
MCH RBC QN AUTO: 31.9 PG (ref 26–34)
MCHC RBC AUTO-ENTMCNC: 34.2 G/DL (ref 30–36.5)
MCV RBC AUTO: 93.2 FL (ref 80–99)
MONOCYTES # BLD: 0.5 K/UL (ref 0–1)
MONOCYTES NFR BLD: 10 % (ref 5–13)
NEUTS SEG # BLD: 2.9 K/UL (ref 1.8–8)
NEUTS SEG NFR BLD: 65 % (ref 32–75)
NITRITE UR QL STRIP.AUTO: NEGATIVE
NRBC # BLD: 0 K/UL (ref 0–0.01)
NRBC BLD-RTO: 0 PER 100 WBC
PH UR STRIP: 7 (ref 5–8)
PLATELET # BLD AUTO: 261 K/UL (ref 150–400)
PMV BLD AUTO: 9.5 FL (ref 8.9–12.9)
POTASSIUM SERPL-SCNC: 4.1 MMOL/L (ref 3.5–5.1)
PROT SERPL-MCNC: 6 G/DL (ref 6.4–8.3)
PROT UR STRIP-MCNC: NEGATIVE MG/DL
RBC # BLD AUTO: 4.14 M/UL (ref 3.8–5.2)
RBC #/AREA URNS HPF: ABNORMAL /HPF
SODIUM SERPL-SCNC: 141 MMOL/L (ref 136–145)
SP GR UR REFRACTOMETRY: 1.01 (ref 1–1.03)
TROPONIN T SERPL HS-MCNC: 10.1 NG/L (ref 0–14)
TROPONIN T SERPL HS-MCNC: 9.5 NG/L (ref 0–14)
URINE CULTURE IF INDICATED: ABNORMAL
UROBILINOGEN UR QL STRIP.AUTO: 0.2 EU/DL (ref 0.2–1)
WBC # BLD AUTO: 4.5 K/UL (ref 3.6–11)
WBC URNS QL MICRO: ABNORMAL /HPF (ref 0–4)

## 2024-09-22 PROCEDURE — 99285 EMERGENCY DEPT VISIT HI MDM: CPT

## 2024-09-22 PROCEDURE — 93010 ELECTROCARDIOGRAM REPORT: CPT | Performed by: HOSPITALIST

## 2024-09-22 PROCEDURE — 85025 COMPLETE CBC W/AUTO DIFF WBC: CPT

## 2024-09-22 PROCEDURE — 93005 ELECTROCARDIOGRAM TRACING: CPT | Performed by: STUDENT IN AN ORGANIZED HEALTH CARE EDUCATION/TRAINING PROGRAM

## 2024-09-22 PROCEDURE — 81001 URINALYSIS AUTO W/SCOPE: CPT

## 2024-09-22 PROCEDURE — 36415 COLL VENOUS BLD VENIPUNCTURE: CPT

## 2024-09-22 PROCEDURE — 70450 CT HEAD/BRAIN W/O DYE: CPT

## 2024-09-22 PROCEDURE — 71045 X-RAY EXAM CHEST 1 VIEW: CPT

## 2024-09-22 PROCEDURE — 80053 COMPREHEN METABOLIC PANEL: CPT

## 2024-09-22 PROCEDURE — 84484 ASSAY OF TROPONIN QUANT: CPT

## 2024-09-22 PROCEDURE — 83735 ASSAY OF MAGNESIUM: CPT

## 2024-09-22 ASSESSMENT — PAIN - FUNCTIONAL ASSESSMENT: PAIN_FUNCTIONAL_ASSESSMENT: NONE - DENIES PAIN

## 2024-09-23 DIAGNOSIS — E78.00 PURE HYPERCHOLESTEROLEMIA: ICD-10-CM

## 2024-09-23 DIAGNOSIS — E55.9 VITAMIN D DEFICIENCY: Primary | ICD-10-CM

## 2024-09-24 ENCOUNTER — LAB (OUTPATIENT)
Facility: CLINIC | Age: 79
End: 2024-09-24

## 2024-09-24 DIAGNOSIS — E78.00 PURE HYPERCHOLESTEROLEMIA: Primary | ICD-10-CM

## 2024-10-02 ENCOUNTER — OFFICE VISIT (OUTPATIENT)
Facility: CLINIC | Age: 79
End: 2024-10-02
Payer: MEDICARE

## 2024-10-02 ENCOUNTER — OFFICE VISIT (OUTPATIENT)
Age: 79
End: 2024-10-02
Payer: MEDICARE

## 2024-10-02 VITALS
HEIGHT: 64 IN | OXYGEN SATURATION: 99 % | WEIGHT: 144.8 LBS | BODY MASS INDEX: 24.72 KG/M2 | DIASTOLIC BLOOD PRESSURE: 68 MMHG | SYSTOLIC BLOOD PRESSURE: 128 MMHG | HEART RATE: 69 BPM

## 2024-10-02 VITALS
RESPIRATION RATE: 18 BRPM | SYSTOLIC BLOOD PRESSURE: 128 MMHG | BODY MASS INDEX: 24.72 KG/M2 | TEMPERATURE: 97.4 F | HEART RATE: 69 BPM | HEIGHT: 64 IN | DIASTOLIC BLOOD PRESSURE: 68 MMHG | WEIGHT: 144.8 LBS | OXYGEN SATURATION: 99 %

## 2024-10-02 DIAGNOSIS — Z28.21 VACCINATION DECLINED: ICD-10-CM

## 2024-10-02 DIAGNOSIS — E78.00 PURE HYPERCHOLESTEROLEMIA: ICD-10-CM

## 2024-10-02 DIAGNOSIS — E78.5 HYPERLIPIDEMIA, UNSPECIFIED HYPERLIPIDEMIA TYPE: ICD-10-CM

## 2024-10-02 DIAGNOSIS — R55 SYNCOPE, UNSPECIFIED SYNCOPE TYPE: Primary | ICD-10-CM

## 2024-10-02 DIAGNOSIS — I65.22 STENOSIS OF LEFT CAROTID ARTERY: Primary | ICD-10-CM

## 2024-10-02 PROCEDURE — G8420 CALC BMI NORM PARAMETERS: HCPCS | Performed by: SPECIALIST

## 2024-10-02 PROCEDURE — 99214 OFFICE O/P EST MOD 30 MIN: CPT | Performed by: SPECIALIST

## 2024-10-02 PROCEDURE — G8484 FLU IMMUNIZE NO ADMIN: HCPCS | Performed by: FAMILY MEDICINE

## 2024-10-02 PROCEDURE — 1036F TOBACCO NON-USER: CPT | Performed by: SPECIALIST

## 2024-10-02 PROCEDURE — 99213 OFFICE O/P EST LOW 20 MIN: CPT | Performed by: FAMILY MEDICINE

## 2024-10-02 PROCEDURE — G8399 PT W/DXA RESULTS DOCUMENT: HCPCS | Performed by: SPECIALIST

## 2024-10-02 PROCEDURE — 1090F PRES/ABSN URINE INCON ASSESS: CPT | Performed by: FAMILY MEDICINE

## 2024-10-02 PROCEDURE — G8427 DOCREV CUR MEDS BY ELIG CLIN: HCPCS | Performed by: FAMILY MEDICINE

## 2024-10-02 PROCEDURE — 1090F PRES/ABSN URINE INCON ASSESS: CPT | Performed by: SPECIALIST

## 2024-10-02 PROCEDURE — 1123F ACP DISCUSS/DSCN MKR DOCD: CPT | Performed by: SPECIALIST

## 2024-10-02 PROCEDURE — 1036F TOBACCO NON-USER: CPT | Performed by: FAMILY MEDICINE

## 2024-10-02 PROCEDURE — G8484 FLU IMMUNIZE NO ADMIN: HCPCS | Performed by: SPECIALIST

## 2024-10-02 PROCEDURE — G8420 CALC BMI NORM PARAMETERS: HCPCS | Performed by: FAMILY MEDICINE

## 2024-10-02 PROCEDURE — G8427 DOCREV CUR MEDS BY ELIG CLIN: HCPCS | Performed by: SPECIALIST

## 2024-10-02 PROCEDURE — G8399 PT W/DXA RESULTS DOCUMENT: HCPCS | Performed by: FAMILY MEDICINE

## 2024-10-02 PROCEDURE — 1123F ACP DISCUSS/DSCN MKR DOCD: CPT | Performed by: FAMILY MEDICINE

## 2024-10-02 RX ORDER — ATORVASTATIN CALCIUM 10 MG/1
10 TABLET, FILM COATED ORAL DAILY
Qty: 90 TABLET | Refills: 3 | Status: SHIPPED | OUTPATIENT
Start: 2024-10-02

## 2024-10-02 ASSESSMENT — PATIENT HEALTH QUESTIONNAIRE - PHQ9
SUM OF ALL RESPONSES TO PHQ QUESTIONS 1-9: 0
SUM OF ALL RESPONSES TO PHQ QUESTIONS 1-9: 0
2. FEELING DOWN, DEPRESSED OR HOPELESS: NOT AT ALL
SUM OF ALL RESPONSES TO PHQ QUESTIONS 1-9: 0
SUM OF ALL RESPONSES TO PHQ QUESTIONS 1-9: 0
SUM OF ALL RESPONSES TO PHQ9 QUESTIONS 1 & 2: 0
1. LITTLE INTEREST OR PLEASURE IN DOING THINGS: NOT AT ALL

## 2024-10-02 NOTE — ASSESSMENT & PLAN NOTE
Reviewed recommendations for flu, COVID, RSV, and shingles vaccines.  She declines all.  States she will not be doing any further vaccinations.

## 2024-10-02 NOTE — PROGRESS NOTES
Subjective  Chief Complaint   Patient presents with    Follow-up Chronic Condition     HPI:  Debi Sexton is a 78 y.o. female.    She is following up since we started the atorvastatin 10 mg.  She had been on pravastatin 40 mg but stopped that while working on healthy lifestyle improvements.  We restarted at the atorvastatin 10 mg based on her history of carotid stenosis.  She reports that she just saw her cardiologist this week and was told that she was doing great.  We had planned to recheck her labs here last week but she just had labs done 10 days ago throughout hospital encounter.  Liver enzymes reviewed by me today were within normal limits.    Past Medical History:   Diagnosis Date    Anxiety     Arthritis     Atrial fibrillation (HCC)     CAD (coronary artery disease)     Fatigue     History of bone density study 2015 neg    Leg swelling     Low vitamin D level     Osteopenia     Polio     left leg shorter    SOB (shortness of breath)     Syncope      Current Outpatient Medications on File Prior to Visit   Medication Sig Dispense Refill    alendronate (FOSAMAX) 70 MG tablet Take one tab weekly in the morning at least 30 minutes before the first food, beverage (except plain water), or other medications. 12 tablet 3    vitamin C (ASCORBIC ACID) 500 MG tablet Take 1 tablet by mouth daily      calcium carb-cholecalciferol (CALCIUM 600+D3) 600-20 MG-MCG TABS Take 1 tablet by mouth daily      atorvastatin (LIPITOR) 10 MG tablet Take 1 tablet by mouth daily 90 tablet 1    vitamin D 25 MCG (1000 UT) CAPS Take 1 capsule by mouth daily      fluticasone (FLONASE) 50 MCG/ACT nasal spray 2 sprays by Nasal route daily       No current facility-administered medications on file prior to visit.     Allergies   Allergen Reactions    Erythromycin Nausea Only    Sertraline Other (See Comments)     Severe nervousness/shaky/jittery        Objective  Vitals:    10/02/24 1352   BP: 128/68   Pulse: 69   Resp: 18   Temp: 97.4 °F

## 2024-10-02 NOTE — ASSESSMENT & PLAN NOTE
She is tolerating atorvastatin well.  Will continue based on history.    Orders:    atorvastatin (LIPITOR) 10 MG tablet; Take 1 tablet by mouth daily

## 2024-10-02 NOTE — PROGRESS NOTES
\"Have you been to the ER, urgent care clinic since your last visit?  Hospitalized since your last visit?\"    NO    “Have you seen or consulted any other health care providers outside our system since your last visit?”    NO  /68 (Site: Right Upper Arm, Position: Sitting, Cuff Size: Medium Adult)   Pulse 69   Temp 97.4 °F (36.3 °C) (Temporal)   Resp 18   Ht 1.626 m (5' 4\")   Wt 65.7 kg (144 lb 12.8 oz)   SpO2 99%   BMI 24.85 kg/m²   Chief Complaint   Patient presents with    Follow-up Chronic Condition     PHQ-9 Total Score: 0 (10/2/2024  1:52 PM)        No questionnaires available.                             Click Here for Release of Records Request       Identified Patient with 2 Patient Identifiers-Name and

## 2024-10-07 ENCOUNTER — TELEPHONE (OUTPATIENT)
Age: 79
End: 2024-10-07

## 2024-10-10 ENCOUNTER — OFFICE VISIT (OUTPATIENT)
Age: 79
End: 2024-10-10
Payer: MEDICARE

## 2024-10-10 VITALS
DIASTOLIC BLOOD PRESSURE: 63 MMHG | BODY MASS INDEX: 24.59 KG/M2 | SYSTOLIC BLOOD PRESSURE: 138 MMHG | RESPIRATION RATE: 14 BRPM | HEIGHT: 64 IN | HEART RATE: 68 BPM | WEIGHT: 144 LBS | OXYGEN SATURATION: 97 %

## 2024-10-10 DIAGNOSIS — I65.23 OCCLUSION AND STENOSIS OF BILATERAL CAROTID ARTERIES: Primary | ICD-10-CM

## 2024-10-10 PROCEDURE — G8399 PT W/DXA RESULTS DOCUMENT: HCPCS | Performed by: PSYCHIATRY & NEUROLOGY

## 2024-10-10 PROCEDURE — G8420 CALC BMI NORM PARAMETERS: HCPCS | Performed by: PSYCHIATRY & NEUROLOGY

## 2024-10-10 PROCEDURE — G8484 FLU IMMUNIZE NO ADMIN: HCPCS | Performed by: PSYCHIATRY & NEUROLOGY

## 2024-10-10 PROCEDURE — 1090F PRES/ABSN URINE INCON ASSESS: CPT | Performed by: PSYCHIATRY & NEUROLOGY

## 2024-10-10 PROCEDURE — 99213 OFFICE O/P EST LOW 20 MIN: CPT | Performed by: PSYCHIATRY & NEUROLOGY

## 2024-10-10 PROCEDURE — 1123F ACP DISCUSS/DSCN MKR DOCD: CPT | Performed by: PSYCHIATRY & NEUROLOGY

## 2024-10-10 PROCEDURE — 1036F TOBACCO NON-USER: CPT | Performed by: PSYCHIATRY & NEUROLOGY

## 2024-10-10 PROCEDURE — G8427 DOCREV CUR MEDS BY ELIG CLIN: HCPCS | Performed by: PSYCHIATRY & NEUROLOGY

## 2024-10-10 NOTE — PROGRESS NOTES
[facility] at [facility] at [address] [place] [address] [address] [facility] [address] [place] [address] [phone]              Chief Complaint   Patient presents with    Carotid stenosis      Follow up Doppler Results      Current Outpatient Medications   Medication Sig Dispense Refill    atorvastatin (LIPITOR) 10 MG tablet Take 1 tablet by mouth daily 90 tablet 3    alendronate (FOSAMAX) 70 MG tablet Take one tab weekly in the morning at least 30 minutes before the first food, beverage (except plain water), or other medications. 12 tablet 3    vitamin C (ASCORBIC ACID) 500 MG tablet Take 1 tablet by mouth daily      calcium carb-cholecalciferol (CALCIUM 600+D3) 600-20 MG-MCG TABS Take 1 tablet by mouth daily      vitamin D 25 MCG (1000 UT) CAPS Take 1 capsule by mouth daily       No current facility-administered medications for this visit.      Allergies   Allergen Reactions    Erythromycin Nausea Only    Sertraline Other (See Comments)     Severe nervousness/shaky/jittery      Social History     Tobacco Use    Smoking status: Never    Smokeless tobacco: Never   Substance Use Topics    Alcohol use: No    Drug use: No     78-year-old lady following up today for mild carotid stenosis.  She just had her carotid Doppler performed.  She has minimal stenosis.  She has not had any focal neurologic symptoms.  She is enjoying her new great granddaughter and her grandchildren and son.    Diagnostics  Dry head CT September 22, 2024 for indication syncope possible head injury is partially reviewed and unremarkable    Most recent laboratory analysis  September 22, 2024  Troponin normal  Urinalysis unremarkable  Magnesium normal  Comprehensive metabolic profile unremarkable  CBC unremarkable    Examination  /63 (Site: Left Upper Arm, Position: Sitting)   Pulse 68   Resp 14   Ht 1.626 m (5'

## 2024-10-15 ENCOUNTER — HOSPITAL ENCOUNTER (OUTPATIENT)
Facility: HOSPITAL | Age: 79
Discharge: HOME | End: 2024-10-18
Payer: MEDICARE

## 2024-10-15 DIAGNOSIS — Z12.31 VISIT FOR SCREENING MAMMOGRAM: ICD-10-CM

## 2024-10-15 PROCEDURE — 77067 SCR MAMMO BI INCL CAD: CPT

## 2024-10-16 ENCOUNTER — TRANSCRIBE ORDERS (OUTPATIENT)
Facility: HOSPITAL | Age: 79
End: 2024-10-16

## 2024-10-16 DIAGNOSIS — Z12.31 OTHER SCREENING MAMMOGRAM: Primary | ICD-10-CM

## 2024-12-03 ENCOUNTER — ANCILLARY PROCEDURE (OUTPATIENT)
Age: 79
End: 2024-12-03
Payer: MEDICARE

## 2024-12-03 VITALS
DIASTOLIC BLOOD PRESSURE: 70 MMHG | BODY MASS INDEX: 24.59 KG/M2 | WEIGHT: 144 LBS | HEART RATE: 64 BPM | HEIGHT: 64 IN | SYSTOLIC BLOOD PRESSURE: 130 MMHG

## 2024-12-03 DIAGNOSIS — R55 SYNCOPE, UNSPECIFIED SYNCOPE TYPE: ICD-10-CM

## 2024-12-03 LAB
ECHO AO ASC DIAM: 3.1 CM
ECHO AO ASCENDING AORTA INDEX: 1.82 CM/M2
ECHO AO ROOT DIAM: 3.2 CM
ECHO AO ROOT INDEX: 1.88 CM/M2
ECHO AV AREA PEAK VELOCITY: 2.5 CM2
ECHO AV AREA VTI: 2.8 CM2
ECHO AV AREA/BSA PEAK VELOCITY: 1.5 CM2/M2
ECHO AV AREA/BSA VTI: 1.6 CM2/M2
ECHO AV MEAN GRADIENT: 4 MMHG
ECHO AV MEAN VELOCITY: 0.9 M/S
ECHO AV PEAK GRADIENT: 7 MMHG
ECHO AV PEAK VELOCITY: 1.3 M/S
ECHO AV VELOCITY RATIO: 0.69
ECHO AV VTI: 25 CM
ECHO BSA: 1.72 M2
ECHO LA DIAMETER INDEX: 1.71 CM/M2
ECHO LA DIAMETER: 2.9 CM
ECHO LA TO AORTIC ROOT RATIO: 0.91
ECHO LA VOL A-L A2C: 52 ML (ref 22–52)
ECHO LA VOL A-L A4C: 35 ML (ref 22–52)
ECHO LA VOL MOD A2C: 52 ML (ref 22–52)
ECHO LA VOL MOD A4C: 32 ML (ref 22–52)
ECHO LA VOLUME AREA LENGTH: 52 ML
ECHO LA VOLUME INDEX A-L A2C: 31 ML/M2 (ref 16–34)
ECHO LA VOLUME INDEX A-L A4C: 21 ML/M2 (ref 16–34)
ECHO LA VOLUME INDEX AREA LENGTH: 31 ML/M2 (ref 16–34)
ECHO LA VOLUME INDEX MOD A2C: 31 ML/M2 (ref 16–34)
ECHO LA VOLUME INDEX MOD A4C: 19 ML/M2 (ref 16–34)
ECHO LV E' LATERAL VELOCITY: 7.95 CM/S
ECHO LV E' SEPTAL VELOCITY: 6.13 CM/S
ECHO LV EF PHYSICIAN: 60 %
ECHO LV FRACTIONAL SHORTENING: 31 % (ref 28–44)
ECHO LV INTERNAL DIMENSION DIASTOLE INDEX: 2.88 CM/M2
ECHO LV INTERNAL DIMENSION DIASTOLIC: 4.9 CM (ref 3.9–5.3)
ECHO LV INTERNAL DIMENSION SYSTOLIC INDEX: 2 CM/M2
ECHO LV INTERNAL DIMENSION SYSTOLIC: 3.4 CM
ECHO LV IVSD: 0.5 CM (ref 0.6–0.9)
ECHO LV MASS 2D: 82.4 G (ref 67–162)
ECHO LV MASS INDEX 2D: 48.5 G/M2 (ref 43–95)
ECHO LV POSTERIOR WALL DIASTOLIC: 0.6 CM (ref 0.6–0.9)
ECHO LV RELATIVE WALL THICKNESS RATIO: 0.24
ECHO LVOT AREA: 3.5 CM2
ECHO LVOT AV VTI INDEX: 0.82
ECHO LVOT DIAM: 2.1 CM
ECHO LVOT MEAN GRADIENT: 2 MMHG
ECHO LVOT PEAK GRADIENT: 4 MMHG
ECHO LVOT PEAK VELOCITY: 0.9 M/S
ECHO LVOT STROKE VOLUME INDEX: 41.7 ML/M2
ECHO LVOT SV: 71 ML
ECHO LVOT VTI: 20.5 CM
ECHO MV A VELOCITY: 0.79 M/S
ECHO MV AREA PHT: 3.4 CM2
ECHO MV AREA VTI: 3.1 CM2
ECHO MV E DECELERATION TIME (DT): 224.1 MS
ECHO MV E VELOCITY: 0.58 M/S
ECHO MV E/A RATIO: 0.73
ECHO MV E/E' LATERAL: 7.3
ECHO MV E/E' RATIO (AVERAGED): 8.38
ECHO MV E/E' SEPTAL: 9.46
ECHO MV LVOT VTI INDEX: 1.13
ECHO MV MAX VELOCITY: 0.8 M/S
ECHO MV MEAN GRADIENT: 1 MMHG
ECHO MV MEAN VELOCITY: 0.5 M/S
ECHO MV PEAK GRADIENT: 3 MMHG
ECHO MV PRESSURE HALF TIME (PHT): 65 MS
ECHO MV VTI: 23.1 CM
ECHO RA AREA 4C: 10 CM2
ECHO RA END SYSTOLIC VOLUME APICAL 4 CHAMBER INDEX BSA: 13 ML/M2
ECHO RA VOLUME: 22 ML
ECHO RV FREE WALL PEAK S': 9.7 CM/S
ECHO RV INTERNAL DIMENSION: 3.3 CM
ECHO RV TAPSE: 2.3 CM (ref 1.7–?)

## 2024-12-03 PROCEDURE — 93306 TTE W/DOPPLER COMPLETE: CPT | Performed by: SPECIALIST

## 2024-12-10 ENCOUNTER — TELEPHONE (OUTPATIENT)
Age: 79
End: 2024-12-10

## 2024-12-10 NOTE — TELEPHONE ENCOUNTER
Dear Ms. Sexton,  I hope you are doing well.  I've reviewed your results.  Your echo shows normal findings.   Heart function is normal.  Please let me know if you have any questions.  Best Regards,  Dr. Arthur    Spoke to pt,  Relayed results as she was having trouble logging onto the computer to review her [employer] msg.

## 2024-12-10 NOTE — TELEPHONE ENCOUNTER
Patient called in requesting to please have an call back to go over her echo results .       Patient # ~ [phone]

## 2025-03-05 RX ORDER — ALENDRONATE SODIUM 70 MG/1
TABLET ORAL
Qty: 12 TABLET | Refills: 3 | Status: SHIPPED | OUTPATIENT
Start: 2025-03-05

## 2025-04-28 ENCOUNTER — TELEMEDICINE (OUTPATIENT)
Facility: CLINIC | Age: 80
End: 2025-04-28
Payer: MEDICARE

## 2025-04-28 DIAGNOSIS — R42 VERTIGO: Primary | ICD-10-CM

## 2025-04-28 PROCEDURE — 99213 OFFICE O/P EST LOW 20 MIN: CPT | Performed by: NURSE PRACTITIONER

## 2025-04-28 RX ORDER — MECLIZINE HYDROCHLORIDE 25 MG/1
25 TABLET ORAL 3 TIMES DAILY PRN
Qty: 30 TABLET | Refills: 1 | Status: SHIPPED | OUTPATIENT
Start: 2025-04-28

## 2025-04-28 SDOH — ECONOMIC STABILITY: FOOD INSECURITY: WITHIN THE PAST 12 MONTHS, YOU WORRIED THAT YOUR FOOD WOULD RUN OUT BEFORE YOU GOT MONEY TO BUY MORE.: NEVER TRUE

## 2025-04-28 SDOH — ECONOMIC STABILITY: FOOD INSECURITY: WITHIN THE PAST 12 MONTHS, THE FOOD YOU BOUGHT JUST DIDN'T LAST AND YOU DIDN'T HAVE MONEY TO GET MORE.: NEVER TRUE

## 2025-04-28 ASSESSMENT — PATIENT HEALTH QUESTIONNAIRE - PHQ9
SUM OF ALL RESPONSES TO PHQ QUESTIONS 1-9: 0
2. FEELING DOWN, DEPRESSED OR HOPELESS: NOT AT ALL
1. LITTLE INTEREST OR PLEASURE IN DOING THINGS: NOT AT ALL
SUM OF ALL RESPONSES TO PHQ QUESTIONS 1-9: 0

## 2025-04-28 NOTE — PROGRESS NOTES
Debi Sexton (:  1945) is a 79 y.o. female, Established patient, here for evaluation of the following chief complaint(s):  Dizziness (Intermittent for about 2 weeks.)         Assessment & Plan  1. Vertigo.  - Symptoms include lightheadedness and dizziness, particularly when lying down at night, with a sensation of the bed spinning. Occurring intermittently for the past 2-3 weeks.  - Blood pressure is 128/72, indicating it is not a contributing factor.  - Discussed that vertigo is likely due to an inner ear issue, which can occur suddenly and often resolves on its own. Advised to take precautions to avoid falls.  - Prescribed meclizine 25 mg to be taken as needed, up to three times daily, with instructions to start with half a tablet. Advised to avoid driving or operating machinery due to potential sedative effects. Prescription sent to [facility] in [place].    Results    1. Vertigo  -     meclizine (ANTIVERT) 25 MG tablet; Take 1 tablet by mouth 3 times daily as needed for Dizziness, Disp-30 tablet, R-1Normal    No follow-ups on file.       Subjective   History of Present Illness  The patient presents via virtual visit for evaluation of vertigo.    The chief complaint is intermittent episodes of lightheadedness and dizziness, particularly when assuming a supine position at night. These symptoms have been present for approximately 2 to 3 weeks. A sensation of her head falling and perceiving a spinning motion is described. The onset of these symptoms is sudden, occurring even during periods of rest. No recent illness or exacerbation of allergies is reported. Current medications include Zyrtec and Claritin, with no nasal congestion or ear popping noted. Blood pressure is reported to be within normal limits, with a reading of 128/72.    A past medical history of vertigo is noted, which was managed with medication prescribed by an ENT specialist, who is her son.    Review of Systems   A comprehensive review

## 2025-04-28 NOTE — PROGRESS NOTES
Chief Complaint   Patient presents with    Dizziness     Intermittent for about 2 weeks.     \"Have you been to the ER, urgent care clinic since your last visit?  Hospitalized since your last visit?\"    NO    “Have you seen or consulted any other health care providers outside of [facility] since your last visit?”    NO     Patient-Reported Systolic (Top): 128 mmHg  Patient-Reported Diastolic (Bottom): 72 mmHg  Patient-Reported Weight: 142lb  Patient-Reported Height: 5f 4.5in      PHQ-9 Total Score: 0 (2025  9:57 AM)           No questionnaires available.                                  Click Here for Release of Records Request     Identified Patient with 2 Patient Identifiers-Name and

## 2025-05-05 ENCOUNTER — TELEPHONE (OUTPATIENT)
Facility: CLINIC | Age: 80
End: 2025-05-05

## 2025-05-05 SDOH — HEALTH STABILITY: PHYSICAL HEALTH: ON AVERAGE, HOW MANY DAYS PER WEEK DO YOU ENGAGE IN MODERATE TO STRENUOUS EXERCISE (LIKE A BRISK WALK)?: 7 DAYS

## 2025-05-05 ASSESSMENT — PATIENT HEALTH QUESTIONNAIRE - PHQ9
SUM OF ALL RESPONSES TO PHQ QUESTIONS 1-9: 0
1. LITTLE INTEREST OR PLEASURE IN DOING THINGS: NOT AT ALL
SUM OF ALL RESPONSES TO PHQ QUESTIONS 1-9: 0
2. FEELING DOWN, DEPRESSED OR HOPELESS: NOT AT ALL
SUM OF ALL RESPONSES TO PHQ QUESTIONS 1-9: 0
SUM OF ALL RESPONSES TO PHQ QUESTIONS 1-9: 0

## 2025-05-05 ASSESSMENT — LIFESTYLE VARIABLES
HOW MANY STANDARD DRINKS CONTAINING ALCOHOL DO YOU HAVE ON A TYPICAL DAY: 0
HOW OFTEN DO YOU HAVE SIX OR MORE DRINKS ON ONE OCCASION: 1
HOW OFTEN DO YOU HAVE A DRINK CONTAINING ALCOHOL: 1
HOW MANY STANDARD DRINKS CONTAINING ALCOHOL DO YOU HAVE ON A TYPICAL DAY: PATIENT DOES NOT DRINK
HOW OFTEN DO YOU HAVE A DRINK CONTAINING ALCOHOL: NEVER

## 2025-05-05 NOTE — TELEPHONE ENCOUNTER
Attempted to contact patient regarding upcoming Medicare wellness appointment and completion of HRA questionnaire. LVM for patient to please return call at [phone].

## 2025-05-05 NOTE — TELEPHONE ENCOUNTER
OUTPATIENT PROGRESS NOTE    HISTORY  The patient is a 82 year old female who comes in for 2 month follow up visit.     Patient was last seen in our office on 9/8/22 for a TCM visit.  At that time because of low blood pressure we decrease amlodipine from 10 mg daily down to 5 mg daily.  However the patient tells me now that she is on a dose of 2.5 mg daily.  She also tells me that she is taking carvedilol once a day rather than twice daily because of low blood pressure and fatigue.    History of Hypertension: patient developed low blood pressure while at nursing home in 9/2022. Amlodipine was recommended to decreased to 7.5 mg daily. Patient continued on coreg 6.25 mg bid and losartan 100 mg daily.  Patient reported that she did not make that change with amlodipine when discharged home. Patient was slightly hypotensive last visit so to avoid aggressive lowering blood pressure because of risk of falling, we decrease amlodipine from 10 mg daily down to 5 daily. Due to some confusion patient is actually taking amlodipine at 2.5 mg daily. Patient also reports that she is only taking coreg once daily due to patient having side effects. Patient's leg swelling has resolved. Recommend patient to discontinue amlodipine completely and resume Coreg at bid. Will reassess in 3 months.  I told the patient that I do not like her blood pressure to be more on the lower side at her age.    History of Chronic dyspnea. Multifactorial. Patient has had extensive cardiac and pulmonary workup. Patient does have pulmonary hypertension, atrial fib, history of COVID infection. History of diastolic heart failure and COPD.  Anemia has resolved.  See previous notes for details. Discussed following up with Dr Wu for possible trial of pulmonary hypertension medication.     History of anemia. Resolved. Repeat CBC with [facility] at 12.7. no bleeding noted.     History of chronic pain pain. Patient is followed by pain management. Patient reports that  Patient returned call regarding upcoming Medicare wellness appointment and completion of HRA questionnaire. Questionnaire completed via phone.     she is scheduled for pain pump insertion. Reports that the trial was effective.     History of pacemaker placement.  Followed by electrophysiology.  Followed by Cardiology.  Note from Cardiology was reviewed November 9, 2022.  Aspirin was recommended to be discontinued but the patient is not comfortable with that.  She continues to be on Coumadin and aspirin.  She denies any bleeding.    History of chronic anticoagulation.  Managed by the Coumadin clinic.  No bleeding complications.    History of COPD.  Was seen by Dr. Marinelli.  She was given prescription for Symbicort the patient has been on it for the past 4 weeks with no significant improvement.     Latest Reference Range & Units 11/16/22 13:20   Sodium 135 - 145 mmol/L 137   Potassium 3.4 - 5.1 mmol/L 3.8   Chloride 97 - 110 mmol/L 100   CO2 21 - 32 mmol/L 26   ANION GAP 7 - 19 mmol/L 15   Glucose 70 - 99 mg/dL 89   BUN 6 - 20 mg/dL 11   Creatinine 0.51 - 0.95 mg/dL 0.76   BUN/CREATININE RATIO 7 - 25  14   Glomerular Filtration Rate >=60  78   CALCIUM 8.4 - 10.2 mg/dL 9.3   Fasting Status 0 - 999 Hours 1   WBC 4.2 - 11.0 K/mcL 7.0   RBC 4.00 - 5.20 mil/mcL 4.51   HGB 12.0 - 15.5 g/dL 12.7   HCT 36.0 - 46.5 % 40.7   MCV 78.0 - 100.0 fl 90.2   MCH 26.0 - 34.0 pg 28.2   MCHC 32.0 - 36.5 g/dL 31.2 (L)   RDW-SD 39.0 - 50.0 fL 51.8 (H)   RDW-CV 11.0 - 15.0 % 15.6 (H)    - 450 K/mcL 342   NRBC <=0 /100 WBC 0   Neutrophil % 69   LYMPH % 16   MONO % 12   EOSIN % 2   BASO % 1   Absolute Neutrophil 1.8 - 7.7 K/mcL 4.8   Absolute Lymph 1.0 - 4.0 K/mcL 1.1   Absolute Mono 0.3 - 0.9 K/mcL 0.9   Absolute Eos 0.0 - 0.5 K/mcL 0.2   Absolute Baso 0.0 - 0.3 K/mcL 0.1   Immature Granulocytes % 0   Absolute Immature Granulocytes 0.0 - 0.2 K/mcL 0.0     MEDICATIONS  Medications were reviewed and updated today.  Current Outpatient Medications   Medication Sig   • enoxaparin (LOVENOX) 60 MG/0.6ML injectable solution Inject 0.6 mLs into the skin every 12 hours.   •  budesonide-formoterol (Symbicort) 160-4.5 MCG/ACT inhaler Inhale 2 puffs into the lungs in the morning and 2 puffs in the evening.   • Acetaminophen 500 MG Cap Take 1,000 mg by mouth every 6 hours as needed (pain, fever).   • magnesium 250 MG tablet Take 1 tablet by mouth daily.   • furosemide (LASIX) 20 MG tablet Take 2 tablets (=40 mg) by mouth every morning and 1 tablet (=20 mg) every evening   • diphenoxylate-atropine (LOMOTIL) 2.5-0.025 MG tablet Take 1 tablet by mouth 4 times daily as needed for Diarrhea.   • warfarin (COUMADIN) 4 MG tablet 2 mg every Su, W, F; 4 mg all other days or as directed by [facility] (Patient taking differently: Take 2mg by mouth on Sunday, Tuesday, and Friday. Take 4mg by mouth daily on all other days.)   • ondansetron (Zofran ODT) 4 MG disintegrating tablet Place 1 tablet onto the tongue every 8 hours as needed for Nausea.   • potassium chloride (KLOR-CON) 10 MEQ ER tablet Take 1 tablet by mouth daily.   • primidone (MYSOLINE) 50 MG tablet Take 2 tablets by mouth nightly.   • gabapentin (NEURONTIN) 300 MG capsule TAKE ONE CAPSULE BY MOUTH EVERY MORNING AND TAKE TWO CAPSULES BY MOUTH EVERY EVENING   • carvedilol (COREG) 6.25 MG tablet TAKE ONE TABLET BY MOUTH TWICE DAILY IN THE MORNING AND AT BEDTIME   • pantoprazole (PROTONIX) 40 MG tablet TAKE ONE TABLET BY MOUTH EVERY DAY   • Ferrous Sulfate (Iron) 325 (65 Fe) MG Tab Take 1 tablet by mouth daily.   • Calcium Citrate 1040 MG Tab Take 1 tablet by mouth daily. (Patient taking differently: Take 950 mg by mouth daily.)   • Mi-Acid Gas Relief 80 MG chewable tablet Chew 80 mg by mouth 4 times daily as needed.   • pravastatin (PRAVACHOL) 20 MG tablet Take 1 tablet by mouth daily.   • losartan (COZAAR) 100 MG tablet Take 1 tablet by mouth daily.   • Cholecalciferol (D3-1000 PO) Take 25 mcg by mouth daily.   • aspirin (ECOTRIN) 81 MG EC tablet Take 81 mg by mouth daily.   • hydroCORTisone (CORTIZONE) 2.5 % cream Apply 1  application topically daily as needed for Rash.    • cyanocobalamin 100 MCG tablet Take 100 mcg by mouth daily.   • Melatonin 3 MG Cap Take 3 mg by mouth at bedtime as needed (sleep).    • Biotin 1000 MCG TABS Take 10,000 mcg by mouth daily. dosage of 10,000 mcg daily     No current facility-administered medications for this visit.       ALLERGIES  Allergies as of 11/16/2022 - Reviewed 11/16/2022   Allergen Reaction Noted   • Amiodarone SWELLING and Other (See Comments) 01/29/2015   • Iodine   (environmental or med) RASH, DIARRHEA, and SHORTNESS OF BREATH    • Latex   (environmental) RASH    • Bacitracin RASH    • Demerol VOMITING    • Lisinopril Cough    • Neosporin [neomycin-bacitracin-polymyxin] RASH    • Sulfa drugs cross reactors HIVES        REVIEW OF SYSTEMS  See above for details.   General: No fatigue. No fever. No chills.  Chronic dyspnea exertion.  Cardiac: No chest pain. No palpitation. No syncope or near syncope. No orthopnea or paroxysmal nocturnal dyspnea.  Respiratory: No cough. No wheezing. No hemoptysis. No shortness of breath.  Gastrointestinal: No nausea. No vomiting. No abdominal pain. No diarrhea. No rectal bleeding. No dysphagia.  Genitourinary: No hematuria. No flank pain. No dysuria. No irritative urinary symptoms. Normal stream.  Neurologic: No headache. No unilateral sensory or motor dysfunction. No dysarthria. No double vision.  Endocrine: No weight change. No dry skin. No palpitation. No hair loss. No constipation.  Skin: No rash, ulcers, or pruritus.  ENT: No runny nose. No sneezing. No nasal drainage.  Eyes: No drainage. No pain.  Mental: No anxiety or depression.    PHYSICAL EXAM  Vitals: Blood pressure 122/58, pulse 68, height 5' (1.524 m), weight 59.9 kg (132 lb), SpO2 100 %.  Wt Readings from Last 3 Encounters:   11/16/22 59.9 kg (132 lb)   10/20/22 57.6 kg (127 lb)   10/11/22 57.6 kg (127 lb)     ENT: Anicteric sclerae. No pallor. Oral mucosa moist. No ulcers, thrush or  erythema.  General: Patient is alert, awake, oriented x3, not in acute distress, pleasant.  Neck: Neck supple. No lymphadenopathy. No jugular venous distention or bruits.  Cardiac: Regular rate and rhythm. No murmur, rubs or gallops. No extra sounds. Point of maximal impulse is normal.  Chest: Clear. No wheezing. No rales. Good air entry. No rhonchi.  Abdomen: Soft. Nontender. Nondistended. No hepatosplenomegaly.  Legs: Normal. No edema. No deep vein thrombosis.  Skin: Warm and dry with no rashes.    LABORATORY  I have reviewed the pertinent laboratory tests. These are the pertinent findings:  Lab Results   Component Value Date    HGBA1C 5.4 10/07/2021    CHOLESTEROL 172 01/10/2022    HDL 94 01/10/2022    CALCLDL 63 01/10/2022    TRIGLYCERIDE 74 01/10/2022    POTASSIUM 3.8 11/16/2022    CREATININE 0.76 11/16/2022    AST 20 08/25/2022    BILIRUBIN 0.8 08/25/2022    TSH 2.389 01/24/2022    WBC 7.0 11/16/2022    HGB 12.7 11/16/2022     11/16/2022    VITD25 32.1 01/24/2022     ASSESSMENT:  1. Benign essential HTN, stable with adequate blood pressure today. Continue with losartan. Resume twice daily dosing of Coreg and discontinue amlodipine completely. Follow low salt diet. Will reassess in 3 months.    2.     History of Chronic dyspnea. Multifactorial. Patient has had extensive cardiac and pulmonary workup. Patient does have pulmonary hypertension, atrial fib, history of COVID infection. History of diastolic heart failure and COPD.  Anemia has resolved.  See previous notes for details. Discussed following up with Dr Wu for possible trial of pulmonary hypertension medication.  The patient was told to keep appointments with other physicians.  3.     History of anemia. Resolved. Repeat CBC with [facility] at 12.7. no bleeding noted.   4.     History of chronic pain pain. Patient is followed by pain management. Patient reports that she is scheduled for pain pump insertion. Reports that the trial was effective.   5.      History of pacemaker placement.  Followed by electrophysiology.  Followed by Cardiology.  Note from Cardiology was reviewed November 9, 2022.  Aspirin was recommended to be discontinued but the patient is not comfortable with that.  She continues to be on Coumadin and aspirin.  She denies any bleeding.  6.      History of chronic anticoagulation.  Managed by the Coumadin clinic.  No bleeding complications.  The patient has like to stay on aspirin once a day.  7.      History of COPD.  Was seen by Dr. Marinelli.  She was given prescription for Symbicort the patient has been on it for the past 4 weeks with no significant improvement.  I told the patient to shortness of breath is most likely multifactorial see discussion above.    Patient Instructions   Stop amlodipine 2.5 mg daily completely because of low blood pressure  Resume carvedilol to twice daily  Other medications stay the same   Continued follow-up with the Coumadin Clinic   Use a cane  Fall precautions  I will call you with the blood test when available   Keep appointment with the Pain Clinic  Keep appointment with other physicians  See me in 3 months for wellness examination earlier if needed    All the above was discussed with the patient in detail; questions were answered to the patient's satisfaction.  Patient left the office in stable condition with verbal and written instructions.    On 11/15/2022, I, Macrina Jay, RN attest that I performed the duties of a scribe for this encounter, in the presence of Dr. Jose Alfredo Guardado.    “The documentation recorded by the scribe accurately and completely reflects the service(s) I personally performed and the decisions made by me.     I, Dr. Guardado, was present with the scribe during the entire visit and agree with all the above.  History of Present Illness, Review of Systems, Medication Review, Physical Examination and Assessment/Plan were all performed by myself.    Portions of this note are brought forward  from previous notes, edited and reviewed by myself as appropriate.

## 2025-05-06 ENCOUNTER — OFFICE VISIT (OUTPATIENT)
Facility: CLINIC | Age: 80
End: 2025-05-06
Payer: MEDICARE

## 2025-05-06 VITALS
WEIGHT: 148.4 LBS | SYSTOLIC BLOOD PRESSURE: 140 MMHG | TEMPERATURE: 97.4 F | HEIGHT: 64 IN | DIASTOLIC BLOOD PRESSURE: 90 MMHG | HEART RATE: 64 BPM | BODY MASS INDEX: 25.33 KG/M2 | RESPIRATION RATE: 20 BRPM | OXYGEN SATURATION: 98 %

## 2025-05-06 DIAGNOSIS — Z28.21 VACCINATION DECLINED: ICD-10-CM

## 2025-05-06 DIAGNOSIS — I65.22 STENOSIS OF LEFT CAROTID ARTERY: ICD-10-CM

## 2025-05-06 DIAGNOSIS — Z78.0 POSTMENOPAUSAL STATE: ICD-10-CM

## 2025-05-06 DIAGNOSIS — E78.00 PURE HYPERCHOLESTEROLEMIA: ICD-10-CM

## 2025-05-06 DIAGNOSIS — E55.9 VITAMIN D DEFICIENCY: ICD-10-CM

## 2025-05-06 DIAGNOSIS — I48.0 PAROXYSMAL ATRIAL FIBRILLATION (HCC): ICD-10-CM

## 2025-05-06 DIAGNOSIS — J34.89 TENDERNESS OVER MAXILLARY SINUS: ICD-10-CM

## 2025-05-06 DIAGNOSIS — Z00.00 MEDICARE ANNUAL WELLNESS VISIT, SUBSEQUENT: Primary | ICD-10-CM

## 2025-05-06 DIAGNOSIS — M85.859 OSTEOPENIA OF HIP, UNSPECIFIED LATERALITY: ICD-10-CM

## 2025-05-06 PROCEDURE — 1123F ACP DISCUSS/DSCN MKR DOCD: CPT | Performed by: FAMILY MEDICINE

## 2025-05-06 PROCEDURE — G8419 CALC BMI OUT NRM PARAM NOF/U: HCPCS | Performed by: FAMILY MEDICINE

## 2025-05-06 PROCEDURE — G8399 PT W/DXA RESULTS DOCUMENT: HCPCS | Performed by: FAMILY MEDICINE

## 2025-05-06 PROCEDURE — 1126F AMNT PAIN NOTED NONE PRSNT: CPT | Performed by: FAMILY MEDICINE

## 2025-05-06 PROCEDURE — 99214 OFFICE O/P EST MOD 30 MIN: CPT | Performed by: FAMILY MEDICINE

## 2025-05-06 PROCEDURE — G2211 COMPLEX E/M VISIT ADD ON: HCPCS | Performed by: FAMILY MEDICINE

## 2025-05-06 PROCEDURE — G8427 DOCREV CUR MEDS BY ELIG CLIN: HCPCS | Performed by: FAMILY MEDICINE

## 2025-05-06 PROCEDURE — 1036F TOBACCO NON-USER: CPT | Performed by: FAMILY MEDICINE

## 2025-05-06 PROCEDURE — 1090F PRES/ABSN URINE INCON ASSESS: CPT | Performed by: FAMILY MEDICINE

## 2025-05-06 PROCEDURE — G0439 PPPS, SUBSEQ VISIT: HCPCS | Performed by: FAMILY MEDICINE

## 2025-05-06 PROCEDURE — 1159F MED LIST DOCD IN RCRD: CPT | Performed by: FAMILY MEDICINE

## 2025-05-06 RX ORDER — ALENDRONATE SODIUM 70 MG/1
TABLET ORAL
Qty: 12 TABLET | Refills: 3 | Status: SHIPPED | OUTPATIENT
Start: 2025-05-06

## 2025-05-06 RX ORDER — ATORVASTATIN CALCIUM 10 MG/1
10 TABLET, FILM COATED ORAL DAILY
Qty: 90 TABLET | Refills: 3 | Status: SHIPPED | OUTPATIENT
Start: 2025-05-06

## 2025-05-06 NOTE — PATIENT INSTRUCTIONS
medicines you take.  How can you care for yourself at home?  Diet    Use less salt when you cook and eat. This helps lower your blood pressure. Taste food before salting. Add only a little salt when you think you need it. With time, your taste buds will adjust to less salt.     Eat fewer snack items, fast foods, canned soups, and other high-salt, high-fat, processed foods.     Read food labels and try to avoid saturated and trans fats. They increase your risk of heart disease by raising cholesterol levels.     Limit the amount of solid fat--butter, margarine, and shortening--you eat. Use olive, peanut, or canola oil when you cook. Bake, broil, and steam foods instead of frying them.     Eat a variety of fruit and vegetables every day. Dark green, deep orange, red, or yellow fruits and vegetables are especially good for you. Examples include spinach, carrots, peaches, and berries.     Foods high in fiber can reduce your cholesterol and provide important vitamins and minerals. High-fiber foods include whole-grain cereals and breads, oatmeal, beans, brown rice, citrus fruits, and apples.     Eat lean proteins. Heart-healthy proteins include seafood, lean meats and poultry, eggs, beans, peas, nuts, seeds, and soy products.     Limit drinks and foods with added sugar. These include candy, desserts, and soda pop.   Heart-healthy lifestyle    If your doctor recommends it, get more exercise. For many people, walking is a good choice. Or you may want to swim, bike, or do other activities. Bit by bit, increase the time you're active every day. Try for at least 30 minutes on most days of the week.     Try to quit or cut back on using tobacco and other nicotine products. This includes smoking and vaping. If you need help quitting, talk to your doctor about stop-smoking programs and medicines. These can increase your chances of quitting for good. Quitting is one of the most important things you can do to protect your heart. It

## 2025-05-06 NOTE — PROGRESS NOTES
The patient, Debi Sexton, identity was verified by name and MRN.  Chief Complaint   Patient presents with    Medicare AWV     No LMP recorded.  BP (!) 140/90 (BP Site: Left Upper Arm, Patient Position: Sitting, BP Cuff Size: Medium Adult)   Pulse 64   Temp 97.4 °F (36.3 °C) (Temporal)   Resp 20   Ht 1.626 m (5' 4\")   Wt 67.3 kg (148 lb 6.4 oz)   SpO2 98%   BMI 25.47 kg/m²       5/5/2025     2:15 PM   Amb Fall Risk Assessment and TUG Test   Do you feel unsteady or are you worried about falling?  no    2 or more falls in past year? no    Fall with injury in past year? no        Proxy-reported     PHQ-9 Total Score: (Proxy-Rptd) 0 (5/5/2025  2:15 PM)    \"Have you been to the ER, urgent care clinic since your last visit?  Hospitalized since your last visit?\"    NO    “Have you seen or consulted any other health care providers outside our system since your last visit?”    NO             Social History     Substance and Sexual Activity   Sexual Activity Not Currently    Birth control/protection: None     Medication list reviewed and active medications noted. Patient is taking medications as directed.  See documentation in medication activity.  Allergies: allergy list reviewed, no new allergies added    Medicare Awv Health Risk Assessment / Depression Screen       Question 5/5/2025  2:15 PM EDT - Filed by Michaela Mclean LPN    Fall Risk Screening     Do you feel unsteady or are you worried about falling? no    Have you fallen 2 or more times in the past year?   no    Have you had any fall with injury in the past year?   no    Health Risk Assessment / General     In general, how would you say your health is? Good    In the past 7 days, have you experienced any of the following: New or Increased Pain, New or Increased Fatigue, Loneliness, Social Isolation, Stress or Anger? No    Do you have a Living Will? Yes    Health Habits/ Nutrition     On average, how many days per week do you engage in moderate to strenuous

## 2025-05-06 NOTE — PROGRESS NOTES
Medicare Annual Wellness Visit    Debi Sexton is here for Medicare AWV    Assessment & Plan  1. Health maintenance: Stable.  Last bone density test on 06/19/2023. Fosamax started after June 2023 DEXA showing hip FRAX 4.1.  - Recommend installation of nonslip mats or grab bars in the bathroom.  - Recommend shingles and RSV vaccines.  - Order bone density test after 06/19/2025.  - Refill Fosamax prescription.  - Perform lab tests today for cholesterol and vitamin D levels.    2. Left maxillary sinus tenderness: Chronic.  Reports occasional flare-ups with tenderness confirmed on physical exam.  - Recommend Flonase, 2 sprays in each nostril daily.  - If no improvement in 1-2 weeks, consider CT scan of sinuses and possible ENT referral.    3. Atrial fibrillation: Stable.  Continues care with Dr. Holman, appointment scheduled for October 2025.  - No new issues or changes noted.    4.  Pure hypercholesterolemia  - Patient reports taking medication daily as directed.  We are checking annual level with labs.    5.  Vitamin D deficiency  - Currently taking 2 tabs daily of calcium plus D.  - Checking level today with labs.    6.  Osteopenia  - Due for next DEXA scan in June.  - Is about week her second year of Fosamax.  Plan for 5-year course if responding well.      Follow-up in 1 year.    Medicare annual wellness visit, subsequent  Postmenopausal state  -     DEXA BONE DENSITY AXIAL SKELETON; Future  Vaccination declined  Pure hypercholesterolemia  -     atorvastatin (LIPITOR) 10 MG tablet; Take 1 tablet by mouth daily, Disp-90 tablet, R-3Normal  -     Lipid Panel  -     CBC with Auto Differential  -     Comprehensive Metabolic Panel  Paroxysmal atrial fibrillation (HCC)  Vitamin D deficiency  -     Vitamin D 25 Hydroxy  Stenosis of left carotid artery  -     atorvastatin (LIPITOR) 10 MG tablet; Take 1 tablet by mouth daily, Disp-90 tablet, R-3Normal  Osteopenia of hip, unspecified laterality  -     alendronate (FOSAMAX) 70

## 2025-05-07 LAB
25(OH)D3+25(OH)D2 SERPL-MCNC: 47.6 NG/ML (ref 30–100)
ALBUMIN SERPL-MCNC: 4 G/DL (ref 3.8–4.8)
ALP SERPL-CCNC: 56 IU/L (ref 44–121)
ALT SERPL-CCNC: 17 IU/L (ref 0–32)
AST SERPL-CCNC: 20 IU/L (ref 0–40)
BASOPHILS # BLD AUTO: 0 X10E3/UL (ref 0–0.2)
BASOPHILS NFR BLD AUTO: 1 %
BILIRUB SERPL-MCNC: 0.6 MG/DL (ref 0–1.2)
BUN SERPL-MCNC: 15 MG/DL (ref 8–27)
BUN/CREAT SERPL: 22 (ref 12–28)
CALCIUM SERPL-MCNC: 9.1 MG/DL (ref 8.7–10.3)
CHLORIDE SERPL-SCNC: 101 MMOL/L (ref 96–106)
CHOLEST SERPL-MCNC: 153 MG/DL (ref 100–199)
CO2 SERPL-SCNC: 24 MMOL/L (ref 20–29)
CREAT SERPL-MCNC: 0.69 MG/DL (ref 0.57–1)
EGFRCR SERPLBLD CKD-EPI 2021: 88 ML/MIN/1.73
EOSINOPHIL # BLD AUTO: 0.1 X10E3/UL (ref 0–0.4)
EOSINOPHIL NFR BLD AUTO: 2 %
ERYTHROCYTE [DISTWIDTH] IN BLOOD BY AUTOMATED COUNT: 12.5 % (ref 11.7–15.4)
GLOBULIN SER CALC-MCNC: 2 G/DL (ref 1.5–4.5)
GLUCOSE SERPL-MCNC: 86 MG/DL (ref 70–99)
HCT VFR BLD AUTO: 41.7 % (ref 34–46.6)
HDLC SERPL-MCNC: 78 MG/DL
HGB BLD-MCNC: 13.9 G/DL (ref 11.1–15.9)
IMM GRANULOCYTES # BLD AUTO: 0 X10E3/UL (ref 0–0.1)
IMM GRANULOCYTES NFR BLD AUTO: 0 %
LDLC SERPL CALC-MCNC: 63 MG/DL (ref 0–99)
LYMPHOCYTES # BLD AUTO: 1 X10E3/UL (ref 0.7–3.1)
LYMPHOCYTES NFR BLD AUTO: 25 %
MCH RBC QN AUTO: 31.7 PG (ref 26.6–33)
MCHC RBC AUTO-ENTMCNC: 33.3 G/DL (ref 31.5–35.7)
MCV RBC AUTO: 95 FL (ref 79–97)
MONOCYTES # BLD AUTO: 0.5 X10E3/UL (ref 0.1–0.9)
MONOCYTES NFR BLD AUTO: 12 %
NEUTROPHILS # BLD AUTO: 2.3 X10E3/UL (ref 1.4–7)
NEUTROPHILS NFR BLD AUTO: 60 %
PLATELET # BLD AUTO: 275 X10E3/UL (ref 150–450)
POTASSIUM SERPL-SCNC: 4.4 MMOL/L (ref 3.5–5.2)
PROT SERPL-MCNC: 6 G/DL (ref 6–8.5)
RBC # BLD AUTO: 4.39 X10E6/UL (ref 3.77–5.28)
SODIUM SERPL-SCNC: 140 MMOL/L (ref 134–144)
TRIGL SERPL-MCNC: 58 MG/DL (ref 0–149)
VLDLC SERPL CALC-MCNC: 12 MG/DL (ref 5–40)
WBC # BLD AUTO: 3.9 X10E3/UL (ref 3.4–10.8)

## 2025-05-08 ENCOUNTER — RESULTS FOLLOW-UP (OUTPATIENT)
Facility: CLINIC | Age: 80
End: 2025-05-08

## 2025-05-17 ENCOUNTER — APPOINTMENT (OUTPATIENT)
Facility: HOSPITAL | Age: 80
End: 2025-05-17
Payer: MEDICARE

## 2025-05-17 ENCOUNTER — HOSPITAL ENCOUNTER (EMERGENCY)
Facility: HOSPITAL | Age: 80
Discharge: HOME | End: 2025-05-17
Attending: STUDENT IN AN ORGANIZED HEALTH CARE EDUCATION/TRAINING PROGRAM
Payer: MEDICARE

## 2025-05-17 VITALS
HEART RATE: 77 BPM | WEIGHT: 149 LBS | RESPIRATION RATE: 19 BRPM | BODY MASS INDEX: 24.83 KG/M2 | DIASTOLIC BLOOD PRESSURE: 72 MMHG | OXYGEN SATURATION: 98 % | TEMPERATURE: 98.2 F | SYSTOLIC BLOOD PRESSURE: 152 MMHG | HEIGHT: 65 IN

## 2025-05-17 DIAGNOSIS — R11.0 NAUSEA: ICD-10-CM

## 2025-05-17 DIAGNOSIS — R42 DIZZINESS: Primary | ICD-10-CM

## 2025-05-17 LAB
ALBUMIN SERPL-MCNC: 4.1 G/DL (ref 3.5–5.2)
ALBUMIN/GLOB SERPL: 1.6 (ref 1.1–2.2)
ALP SERPL-CCNC: 54 U/L (ref 35–104)
ALT SERPL-CCNC: 11 U/L (ref 10–35)
ANION GAP SERPL CALC-SCNC: 11 MMOL/L (ref 2–12)
AST SERPL-CCNC: 17 U/L (ref 10–35)
BASOPHILS # BLD: 0.02 K/UL (ref 0–0.1)
BASOPHILS NFR BLD: 0.5 % (ref 0–1)
BILIRUB SERPL-MCNC: 0.4 MG/DL (ref 0.2–1)
BUN SERPL-MCNC: 14 MG/DL (ref 8–23)
BUN/CREAT SERPL: 17 (ref 12–20)
CALCIUM SERPL-MCNC: 8.8 MG/DL (ref 8.8–10.2)
CHLORIDE SERPL-SCNC: 104 MMOL/L (ref 98–107)
CO2 SERPL-SCNC: 28 MMOL/L (ref 22–29)
COMMENT:: NORMAL
CREAT SERPL-MCNC: 0.82 MG/DL (ref 0.5–0.9)
DIFFERENTIAL METHOD BLD: NORMAL
EKG ATRIAL RATE: 76 BPM
EKG DIAGNOSIS: NORMAL
EKG P AXIS: 55 DEGREES
EKG P-R INTERVAL: 174 MS
EKG Q-T INTERVAL: 372 MS
EKG QRS DURATION: 82 MS
EKG QTC CALCULATION (BAZETT): 418 MS
EKG R AXIS: 19 DEGREES
EKG T AXIS: 40 DEGREES
EKG VENTRICULAR RATE: 76 BPM
EOSINOPHIL # BLD: 0.06 K/UL (ref 0–0.4)
EOSINOPHIL NFR BLD: 1.4 % (ref 0–7)
ERYTHROCYTE [DISTWIDTH] IN BLOOD BY AUTOMATED COUNT: 12.4 % (ref 11.5–14.5)
GLOBULIN SER CALC-MCNC: 2.6 G/DL (ref 2–4)
GLUCOSE SERPL-MCNC: 127 MG/DL (ref 65–100)
HCT VFR BLD AUTO: 42.3 % (ref 35–47)
HGB BLD-MCNC: 14.3 G/DL (ref 11.5–16)
IMM GRANULOCYTES # BLD AUTO: 0.01 K/UL (ref 0–0.04)
IMM GRANULOCYTES NFR BLD AUTO: 0.2 % (ref 0–0.5)
LYMPHOCYTES # BLD: 1.35 K/UL (ref 0.8–3.5)
LYMPHOCYTES NFR BLD: 30.6 % (ref 12–49)
MAGNESIUM SERPL-MCNC: 1.9 MG/DL (ref 1.6–2.4)
MCH RBC QN AUTO: 31.8 PG (ref 26–34)
MCHC RBC AUTO-ENTMCNC: 33.8 G/DL (ref 30–36.5)
MCV RBC AUTO: 94 FL (ref 80–99)
MONOCYTES # BLD: 0.47 K/UL (ref 0–1)
MONOCYTES NFR BLD: 10.7 % (ref 5–13)
NEUTS SEG # BLD: 2.5 K/UL (ref 1.8–8)
NEUTS SEG NFR BLD: 56.6 % (ref 32–75)
NRBC # BLD: 0 K/UL (ref 0–0.01)
NRBC BLD-RTO: 0 PER 100 WBC
PLATELET # BLD AUTO: 270 K/UL (ref 150–400)
PMV BLD AUTO: 9.1 FL (ref 8.9–12.9)
POTASSIUM SERPL-SCNC: 3.8 MMOL/L (ref 3.5–5.1)
PROT SERPL-MCNC: 6.7 G/DL (ref 6.4–8.3)
RBC # BLD AUTO: 4.5 M/UL (ref 3.8–5.2)
SODIUM SERPL-SCNC: 143 MMOL/L (ref 136–145)
SPECIMEN HOLD: NORMAL
TROPONIN T SERPL HS-MCNC: 10.2 NG/L (ref 0–14)
WBC # BLD AUTO: 4.4 K/UL (ref 3.6–11)

## 2025-05-17 PROCEDURE — 6370000000 HC RX 637 (ALT 250 FOR IP): Performed by: STUDENT IN AN ORGANIZED HEALTH CARE EDUCATION/TRAINING PROGRAM

## 2025-05-17 PROCEDURE — 83735 ASSAY OF MAGNESIUM: CPT

## 2025-05-17 PROCEDURE — 93005 ELECTROCARDIOGRAM TRACING: CPT

## 2025-05-17 PROCEDURE — 6360000004 HC RX CONTRAST MEDICATION: Performed by: STUDENT IN AN ORGANIZED HEALTH CARE EDUCATION/TRAINING PROGRAM

## 2025-05-17 PROCEDURE — 80053 COMPREHEN METABOLIC PANEL: CPT

## 2025-05-17 PROCEDURE — 2580000003 HC RX 258: Performed by: STUDENT IN AN ORGANIZED HEALTH CARE EDUCATION/TRAINING PROGRAM

## 2025-05-17 PROCEDURE — 96361 HYDRATE IV INFUSION ADD-ON: CPT

## 2025-05-17 PROCEDURE — 70498 CT ANGIOGRAPHY NECK: CPT

## 2025-05-17 PROCEDURE — 70450 CT HEAD/BRAIN W/O DYE: CPT

## 2025-05-17 PROCEDURE — 99285 EMERGENCY DEPT VISIT HI MDM: CPT

## 2025-05-17 PROCEDURE — 84484 ASSAY OF TROPONIN QUANT: CPT

## 2025-05-17 PROCEDURE — 85025 COMPLETE CBC W/AUTO DIFF WBC: CPT

## 2025-05-17 PROCEDURE — 36415 COLL VENOUS BLD VENIPUNCTURE: CPT

## 2025-05-17 PROCEDURE — 96374 THER/PROPH/DIAG INJ IV PUSH: CPT

## 2025-05-17 PROCEDURE — 6360000002 HC RX W HCPCS: Performed by: STUDENT IN AN ORGANIZED HEALTH CARE EDUCATION/TRAINING PROGRAM

## 2025-05-17 RX ORDER — 0.9 % SODIUM CHLORIDE 0.9 %
1000 INTRAVENOUS SOLUTION INTRAVENOUS ONCE
Status: COMPLETED | OUTPATIENT
Start: 2025-05-17 | End: 2025-05-17

## 2025-05-17 RX ORDER — ONDANSETRON 2 MG/ML
4 INJECTION INTRAMUSCULAR; INTRAVENOUS ONCE
Status: COMPLETED | OUTPATIENT
Start: 2025-05-17 | End: 2025-05-17

## 2025-05-17 RX ORDER — IOPAMIDOL 755 MG/ML
100 INJECTION, SOLUTION INTRAVASCULAR
Status: COMPLETED | OUTPATIENT
Start: 2025-05-17 | End: 2025-05-17

## 2025-05-17 RX ORDER — ONDANSETRON 4 MG/1
4 TABLET, ORALLY DISINTEGRATING ORAL 3 TIMES DAILY PRN
Qty: 21 TABLET | Refills: 0 | Status: SHIPPED | OUTPATIENT
Start: 2025-05-17

## 2025-05-17 RX ORDER — ACETAMINOPHEN 500 MG
1000 TABLET ORAL
Status: COMPLETED | OUTPATIENT
Start: 2025-05-17 | End: 2025-05-17

## 2025-05-17 RX ADMIN — ACETAMINOPHEN 1000 MG: 500 TABLET ORAL at 09:27

## 2025-05-17 RX ADMIN — SODIUM CHLORIDE 1000 ML: 0.9 INJECTION, SOLUTION INTRAVENOUS at 09:27

## 2025-05-17 RX ADMIN — ONDANSETRON 4 MG: 2 INJECTION, SOLUTION INTRAMUSCULAR; INTRAVENOUS at 09:27

## 2025-05-17 RX ADMIN — IOPAMIDOL 100 ML: 755 INJECTION, SOLUTION INTRAVENOUS at 09:12

## 2025-05-17 ASSESSMENT — PAIN DESCRIPTION - DESCRIPTORS
DESCRIPTORS: PRESSURE
DESCRIPTORS: PRESSURE

## 2025-05-17 ASSESSMENT — ENCOUNTER SYMPTOMS: SHORTNESS OF BREATH: 0

## 2025-05-17 ASSESSMENT — PAIN DESCRIPTION - LOCATION
LOCATION: HEAD;FACE
LOCATION: FACE

## 2025-05-17 ASSESSMENT — PAIN - FUNCTIONAL ASSESSMENT: PAIN_FUNCTIONAL_ASSESSMENT: 0-10

## 2025-05-17 ASSESSMENT — PAIN SCALES - GENERAL
PAINLEVEL_OUTOF10: 7
PAINLEVEL_OUTOF10: 7

## 2025-05-17 ASSESSMENT — PAIN DESCRIPTION - ORIENTATION
ORIENTATION: LEFT
ORIENTATION: LEFT

## 2025-05-17 NOTE — ED TRIAGE NOTES
Patient arrives with c/o dizziness, light-headedness, nausea, intermittently for past 2 - 3 weeks. Reports left facial pressure, with decreased sensation on left cheek.    Denies chest pain, SOB, vomiting.     Per son, patient has not had \"normal mentation\" for past week. States pt was seen by PCP and prescribed meclizine. Pt reports intermittent relief from medication.     Pt is A&O x 4.

## 2025-05-17 NOTE — ED PROVIDER NOTES
[facility]  EMERGENCY DEPARTMENT ENCOUNTER      Pt Name: Debi Sexton  MRN: [id number]  Birthdate 1945  Date of evaluation: 5/17/2025  Provider: Jaspal Paniagua MD    CHIEF COMPLAINT       Chief Complaint   Patient presents with    Dizziness    Nausea         HISTORY OF PRESENT ILLNESS   79-year-old female with history significant for paroxysmal A-fib presents to the ED with chief complaint of intermittent dizziness for the past 2 weeks.  Patient reports associated nausea and left-sided facial pain.  Patient saw primary care for symptoms and was prescribed meclizine which she has taken with mild relief.  She had especially bad episode starting this morning prompting ED visit.  Symptoms are positional, worse with ambulation though she has been able to walk.  Denies any numbness, weakness, vision changes, speech difficulty.  She does have some pain in her left ear as well.  No recent falls or injuries.  She is not on blood thinners.    The history is provided by the patient.       Review of External Medical Records:     Nursing Notes were reviewed.    REVIEW OF SYSTEMS       Review of Systems   Respiratory:  Negative for shortness of breath.    Cardiovascular:  Negative for chest pain.       Except as noted above the remainder of the review of systems was reviewed and negative.       PAST MEDICAL HISTORY     Past Medical History:   Diagnosis Date    Anxiety     Arthritis     Atrial fibrillation (HCC)     CAD (coronary artery disease)     Fatigue     History of bone density study 2015 neg    Leg swelling     Low vitamin D level     Osteopenia     [place]     left leg shorter    SOB (shortness of breath)     Syncope          SURGICAL HISTORY       Past Surgical History:   Procedure Laterality Date    BREAST BIOPSY      BREAST SURGERY  1975    COLONOSCOPY  03/28/2008    BERT BIOPSY BREAST STEREOTACTIC Right 1980    negative    ORTHOPEDIC SURGERY  1978         CURRENT MEDICATIONS       Previous

## 2025-05-17 NOTE — ED NOTES
Orthostatics:    Lying: HR: 71, BP: 146/58  Sitting: HR: 78, BP: 150/60  Standing: HR: 76, BP: 152/77

## 2025-05-19 LAB
EKG ATRIAL RATE: 76 BPM
EKG DIAGNOSIS: NORMAL
EKG P AXIS: 55 DEGREES
EKG P-R INTERVAL: 174 MS
EKG Q-T INTERVAL: 372 MS
EKG QRS DURATION: 82 MS
EKG QTC CALCULATION (BAZETT): 418 MS
EKG R AXIS: 19 DEGREES
EKG T AXIS: 40 DEGREES
EKG VENTRICULAR RATE: 76 BPM

## 2025-08-20 ENCOUNTER — TELEPHONE (OUTPATIENT)
Age: 80
End: 2025-08-20